# Patient Record
Sex: FEMALE | Race: WHITE | NOT HISPANIC OR LATINO | Employment: OTHER | URBAN - METROPOLITAN AREA
[De-identification: names, ages, dates, MRNs, and addresses within clinical notes are randomized per-mention and may not be internally consistent; named-entity substitution may affect disease eponyms.]

---

## 2018-03-08 ENCOUNTER — OFFICE VISIT (OUTPATIENT)
Dept: NEUROLOGY | Facility: CLINIC | Age: 78
End: 2018-03-08

## 2018-03-08 ENCOUNTER — LAB REQUISITION (OUTPATIENT)
Dept: LAB | Facility: HOSPITAL | Age: 78
End: 2018-03-08

## 2018-03-08 VITALS
HEART RATE: 76 BPM | SYSTOLIC BLOOD PRESSURE: 139 MMHG | BODY MASS INDEX: 32.2 KG/M2 | WEIGHT: 175 LBS | HEIGHT: 62 IN | DIASTOLIC BLOOD PRESSURE: 96 MMHG

## 2018-03-08 DIAGNOSIS — G45.0 VERTEBROBASILAR ARTERY SYNDROME: Primary | ICD-10-CM

## 2018-03-08 DIAGNOSIS — D32.0 CEREBELLAR MENINGIOMA (HCC): ICD-10-CM

## 2018-03-08 DIAGNOSIS — Z00.00 ROUTINE GENERAL MEDICAL EXAMINATION AT A HEALTH CARE FACILITY: ICD-10-CM

## 2018-03-08 PROCEDURE — 99204 OFFICE O/P NEW MOD 45 MIN: CPT | Performed by: PSYCHIATRY & NEUROLOGY

## 2018-03-08 PROCEDURE — 36415 COLL VENOUS BLD VENIPUNCTURE: CPT | Performed by: PSYCHIATRY & NEUROLOGY

## 2018-03-08 RX ORDER — ESCITALOPRAM OXALATE 10 MG/1
10 TABLET ORAL DAILY
Refills: 1 | COMMUNITY
Start: 2017-12-20 | End: 2018-05-02 | Stop reason: DRUGHIGH

## 2018-03-08 RX ORDER — LEVOTHYROXINE SODIUM 88 UG/1
88 TABLET ORAL DAILY
Refills: 1 | COMMUNITY
Start: 2018-02-13 | End: 2020-01-15

## 2018-03-08 NOTE — PROGRESS NOTES
Subjective:     Patient ID: Nisreen Willams is a 77 y.o. female.    CC:   Chief Complaint   Patient presents with   • Headache       HPI:   History of Present Illness  The following portions of the patient's history were reviewed and updated as appropriate: allergies, current medications, past family history, past medical history, past social history, past surgical history and problem list.     76 y/o female was seen at [facility] ED 2 nights ago with sudden onset of intense vertigo, nausea, loss of balance, trouble seeing, symptoms improved in the ED with meclizine which she has continued. CT there suggested a 1/5 cm right CP angle mass probable meningioma. She has not had further symptoms, denies headaches, prior attacks of vertigo.    Past Medical History:   Diagnosis Date   • Arthritis    • Hypertension    • Hypothyroid        Past Surgical History:   Procedure Laterality Date   • CHOLECYSTECTOMY     • HYSTERECTOMY         Social History     Social History   • Marital status:      Spouse name: N/A   • Number of children: N/A   • Years of education: N/A     Occupational History   • Not on file.     Social History Main Topics   • Smoking status: Never Smoker   • Smokeless tobacco: Never Used   • Alcohol use No   • Drug use: No   • Sexual activity: Defer     Other Topics Concern   • Not on file     Social History Narrative       Family History   Problem Relation Age of Onset   • Diabetes Mother    • Hypertension Mother    • Heart disease Father         Review of Systems   Constitutional: Negative for chills, fatigue, fever and unexpected weight change.   HENT: Negative for ear pain, hearing loss, nosebleeds, rhinorrhea and sore throat.    Eyes: Negative for photophobia, pain, discharge, itching and visual disturbance.   Respiratory: Negative for cough, chest tightness, shortness of breath and wheezing.    Cardiovascular: Negative for chest pain, palpitations and leg swelling.   Gastrointestinal: Negative for  abdominal pain, blood in stool, constipation, diarrhea, nausea and vomiting.   Genitourinary: Negative for dysuria, frequency, hematuria and urgency.   Musculoskeletal: Negative for arthralgias, back pain, gait problem, joint swelling, myalgias, neck pain and neck stiffness.   Skin: Negative for rash and wound.   Allergic/Immunologic: Negative for environmental allergies and food allergies.   Neurological: Positive for dizziness and light-headedness. Negative for tremors, seizures, syncope, speech difficulty, weakness, numbness and headaches.   Hematological: Negative for adenopathy. Does not bruise/bleed easily.   Psychiatric/Behavioral: Negative for agitation, confusion, decreased concentration, hallucinations, sleep disturbance and suicidal ideas. The patient is nervous/anxious.         Objective:    Neurologic Exam     Mental Status   Oriented to person, place, and time.     Cranial Nerves     CN III, IV, VI   Pupils are equal, round, and reactive to light.  Extraocular motions are normal.     Motor Exam     Strength   Strength 5/5 throughout.       Physical Exam   Constitutional: She is oriented to person, place, and time. She appears well-developed and well-nourished.   HENT:   Head: Normocephalic and atraumatic.   Eyes: EOM are normal. Pupils are equal, round, and reactive to light.   Neck: Normal range of motion. Neck supple. Carotid bruit is not present.   Cardiovascular: Normal rate, regular rhythm, normal heart sounds and intact distal pulses.    Pulmonary/Chest: Effort normal and breath sounds normal.   Abdominal: Soft. Bowel sounds are normal.   Musculoskeletal: Normal range of motion.   Neurological: She is alert and oriented to person, place, and time. She has normal strength and normal reflexes. No cranial nerve deficit or sensory deficit. She displays a negative Romberg sign. Coordination and gait normal. She displays no Babinski's sign on the right side. She displays no Babinski's sign on the left  side.   Skin: Skin is warm.   Psychiatric: She has a normal mood and affect. Her behavior is normal. Thought content normal. Cognition and memory are normal.       Assessment/Plan:       Nisreen was seen today for headache.    Diagnoses and all orders for this visit:    Vertebrobasilar artery syndrome  -     Creatinine, Serum  -     MRI Brain With & Without Contrast  -     MRI Angiogram Head Without Contrast    Cerebellar meningioma  -     Creatinine, Serum  -     MRI Brain With & Without Contrast  -     MRI Angiogram Head Without Contrast       We discussed that her attack of vertigo is likely of vestibular origin, questionable meningioma may be incidental. MRI/MRA brain is requested for further evaluation. She knows to report new or worsening symptoms immediately.    Donn Saldana MD  3/8/2018

## 2018-03-09 LAB
CREAT SERPL-MCNC: 0.9 MG/DL (ref 0.6–1.3)
GFR SERPLBLD CREATININE-BSD FMLA CKD-EPI: 61 ML/MIN/1.73
GFR SERPLBLD CREATININE-BSD FMLA CKD-EPI: 73 ML/MIN/1.73

## 2018-03-13 ENCOUNTER — HOSPITAL ENCOUNTER (OUTPATIENT)
Dept: MRI IMAGING | Facility: HOSPITAL | Age: 78
Discharge: HOME | End: 2018-03-13
Attending: PSYCHIATRY & NEUROLOGY | Admitting: PSYCHIATRY & NEUROLOGY

## 2018-03-13 ENCOUNTER — HOSPITAL ENCOUNTER (OUTPATIENT)
Dept: MRI IMAGING | Facility: HOSPITAL | Age: 78
Discharge: HOME | End: 2018-03-13
Attending: PSYCHIATRY & NEUROLOGY

## 2018-03-13 DIAGNOSIS — I67.1 CEREBRAL ANEURYSM: Primary | ICD-10-CM

## 2018-03-13 PROCEDURE — 0 GADOBENATE DIMEGLUMINE 529 MG/ML SOLUTION: Performed by: PSYCHIATRY & NEUROLOGY

## 2018-03-13 PROCEDURE — A9577 INJ MULTIHANCE: HCPCS | Performed by: PSYCHIATRY & NEUROLOGY

## 2018-03-13 PROCEDURE — 70544 MR ANGIOGRAPHY HEAD W/O DYE: CPT

## 2018-03-13 PROCEDURE — 70553 MRI BRAIN STEM W/O & W/DYE: CPT

## 2018-03-13 RX ADMIN — GADOBENATE DIMEGLUMINE 15 ML: 529 INJECTION, SOLUTION INTRAVENOUS at 12:50

## 2018-03-15 ENCOUNTER — TELEPHONE (OUTPATIENT)
Dept: NEUROLOGY | Facility: CLINIC | Age: 78
End: 2018-03-15

## 2018-03-15 NOTE — TELEPHONE ENCOUNTER
----- Message from Donn Saldana MD sent at 3/13/2018  2:13 PM EDT -----  Regarding: MRI  Aneurism found, referred to [facility] for ASAP consult, thanks.  ----- Message -----  From: [employer]  Sent: 3/13/2018   1:29 PM  To: Donn Saldana MD

## 2018-03-16 ENCOUNTER — OFFICE VISIT (OUTPATIENT)
Dept: NEUROSURGERY | Facility: CLINIC | Age: 78
End: 2018-03-16

## 2018-03-16 ENCOUNTER — RESULTS ENCOUNTER (OUTPATIENT)
Dept: NEUROSURGERY | Facility: CLINIC | Age: 78
End: 2018-03-16

## 2018-03-16 VITALS
TEMPERATURE: 98 F | HEIGHT: 62 IN | WEIGHT: 173 LBS | DIASTOLIC BLOOD PRESSURE: 80 MMHG | BODY MASS INDEX: 31.83 KG/M2 | SYSTOLIC BLOOD PRESSURE: 122 MMHG

## 2018-03-16 DIAGNOSIS — I67.1 INTRACRANIAL ANEURYSM: Primary | ICD-10-CM

## 2018-03-16 DIAGNOSIS — I67.1 INTRACRANIAL ANEURYSM: ICD-10-CM

## 2018-03-16 PROCEDURE — 99203 OFFICE O/P NEW LOW 30 MIN: CPT | Performed by: NEUROLOGICAL SURGERY

## 2018-03-16 RX ORDER — SODIUM CHLORIDE 9 MG/ML
50 INJECTION, SOLUTION INTRAVENOUS CONTINUOUS
Status: CANCELLED | OUTPATIENT
Start: 2018-03-16

## 2018-03-16 RX ORDER — SODIUM CHLORIDE 0.9 % (FLUSH) 0.9 %
1-10 SYRINGE (ML) INJECTION AS NEEDED
Status: CANCELLED | OUTPATIENT
Start: 2018-03-16

## 2018-03-16 NOTE — H&P
Subjective     Chief Complaint: Intracranial aneurysm    Patient ID: Gloria Willams is a 77 y.o. 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  She denies any headaches, nausea, vomiting, or strokelike symptoms.  She has no family history of intracranial aneurysms.  Her risk factors for aneurysm include hypertension.  She has never smoked cigarettes.            The following portions of the patient's history were reviewed and updated as appropriate: allergies, current medications, past family history, past medical history, past social history, past surgical history and problem list.    Family history:   Family History   Problem Relation Age of Onset   • Diabetes Mother    • Hypertension Mother    • Heart disease Father        Social history:   Social History     Social History   • Marital status:      Spouse name: N/A   • Number of children: N/A   • Years of education: N/A     Occupational History   • Not on file.     Social History Main Topics   • Smoking status: Never Smoker   • Smokeless tobacco: Never Used   • Alcohol use No   • Drug use: No   • Sexual activity: Defer     Other Topics Concern   • Not on file     Social History Narrative   • No narrative on file       Review of Systems   Constitutional: Negative for activity change, appetite change, chills, diaphoresis, fatigue, fever and unexpected weight change.   HENT: Negative for congestion, dental problem, drooling, ear discharge, ear pain, facial swelling, hearing loss, mouth sores, nosebleeds, postnasal drip, rhinorrhea, sinus pressure, sneezing, sore throat, tinnitus, trouble swallowing and voice change.    Eyes: Negative for photophobia, pain, discharge, redness, itching and visual disturbance.   Respiratory: Negative for apnea, cough, choking, chest tightness, shortness of breath,  "wheezing and stridor.    Cardiovascular: Negative for chest pain, palpitations and leg swelling.   Gastrointestinal: Negative for abdominal distention, abdominal pain, anal bleeding, blood in stool, constipation, diarrhea, nausea, rectal pain and vomiting.   Endocrine: Negative for cold intolerance, heat intolerance, polydipsia, polyphagia and polyuria.   Genitourinary: Negative for decreased urine volume, difficulty urinating, dysuria, enuresis, flank pain, frequency, genital sores, hematuria and urgency.   Musculoskeletal: Negative for arthralgias, back pain, gait problem, joint swelling, myalgias, neck pain and neck stiffness.   Skin: Negative for color change, pallor, rash and wound.   Allergic/Immunologic: Negative for environmental allergies, food allergies and immunocompromised state.   Neurological: Positive for dizziness and light-headedness. Negative for tremors, seizures, syncope, facial asymmetry, speech difficulty, weakness, numbness and headaches.   Hematological: Negative for adenopathy. Does not bruise/bleed easily.   Psychiatric/Behavioral: Negative for agitation, behavioral problems, confusion, decreased concentration, dysphoric mood, hallucinations, self-injury, sleep disturbance and suicidal ideas. The patient is not nervous/anxious and is not hyperactive.        Objective   Blood pressure 122/80, temperature 98 °F (36.7 °C), height 157.5 cm (62.01\"), weight 78.5 kg (173 lb).  Body mass index is 31.63 kg/m².    Physical Exam   Constitutional: She is oriented to person, place, and time. She appears well-developed and well-nourished.  Non-toxic appearance. No distress.   HENT:   Head: Normocephalic and atraumatic.   Mouth/Throat: Oropharynx is clear and moist.   Eyes: EOM are normal. Pupils are equal, round, and reactive to light. Right eye exhibits no discharge. Left eye exhibits no discharge.   Neck: Phonation normal. No JVD present. No tracheal deviation present. No thyromegaly present. "   Cardiovascular: Normal rate and regular rhythm.    Pulmonary/Chest: Effort normal. No stridor. No respiratory distress. She has no wheezes.   Abdominal: Soft. Normal appearance. She exhibits no distension. There is no tenderness.   Musculoskeletal: She exhibits no edema.   Muscle Group     L          R  Deltoid                5          5  Bicep                  5          5  Tricep                 5          5                       5          5  Hand IO              5          5  Hip Flexor           5          5  Knee Extensor   5          5     ADF                    5          5  APF                    5          5      Neurological: She is alert and oriented to person, place, and time. She has normal reflexes. She displays normal reflexes. No cranial nerve deficit or sensory deficit. She exhibits normal muscle tone. She displays a negative Romberg sign. Coordination and gait normal. GCS eye subscore is 4. GCS verbal subscore is 5. GCS motor subscore is 6.   Reflex Scores:       Tricep reflexes are 2+ on the right side and 2+ on the left side.       Bicep reflexes are 2+ on the right side and 2+ on the left side.       Brachioradialis reflexes are 2+ on the right side and 2+ on the left side.       Patellar reflexes are 2+ on the right side and 2+ on the left side.       Achilles reflexes are 2+ on the right side and 2+ on the left side.  CN II-XII grossly intact.    No pronator drift. FTN testing performed without dysmetria or bradykinesia.   Skin: Skin is warm and dry. She is not diaphoretic. No erythema.   Psychiatric: She has a normal mood and affect. Her speech is normal and behavior is normal. Judgment and thought content normal.   Nursing note and vitals reviewed.        Assessment/Plan     Independent Review of Radiographic Studies:      Available for my review is a MRA of the brain that was performed on 3/13/18.  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    Medical Decision Making:      This is a 77-year-old woman with an incidentally discovered right PICA aneurysm following workup for vertigo.  She needs a diagnostic cerebral angiogram, and she will probably ultimately need some form of treatment.  A discussion of clipping versus coiling will be undertaken after her angiogram has been performed.    Informed consent was obtained for a cerebral angiogram and possible intervention. She acknowledges a risk of stroke, coma, death, pain, paralysis, blindness, permanent neurological disability, access complications, hematoma, limb ischemia, renal failure, allergic reaction, radiation exposure, hair loss, tumor growth, failure of benefit of the procedure, or need for additional procedures. All questions were answered. No guarantees were given or implied. She elects to proceed.    We will schedule her for an elective angiogram in the near future.  I did review the signs and symptoms of subarachnoid hemorrhage with her, and I directed her to contact my office with new, or worsening symptoms.  I told her to call 911 if she thinks she is having a stroke.    Gloria was seen today for cerebral aneurysm.    Diagnoses and all orders for this visit:    Intracranial aneurysm        No Follow-up on file.           This document signed by JUSTIN Cary MD March 16, 2018 10:46 AM

## 2018-03-27 ENCOUNTER — HOSPITAL ENCOUNTER (OUTPATIENT)
Facility: HOSPITAL | Age: 78
Discharge: HOME | End: 2018-03-27
Attending: NEUROLOGICAL SURGERY | Admitting: NEUROLOGICAL SURGERY

## 2018-03-27 VITALS
HEIGHT: 62 IN | TEMPERATURE: 97.3 F | OXYGEN SATURATION: 95 % | WEIGHT: 172.84 LBS | DIASTOLIC BLOOD PRESSURE: 68 MMHG | RESPIRATION RATE: 16 BRPM | BODY MASS INDEX: 31.81 KG/M2 | HEART RATE: 76 BPM | SYSTOLIC BLOOD PRESSURE: 123 MMHG

## 2018-03-27 DIAGNOSIS — I67.1 INTRACRANIAL ANEURYSM: ICD-10-CM

## 2018-03-27 LAB
ANION GAP SERPL CALCULATED.3IONS-SCNC: 5 MMOL/L (ref 3–11)
BUN BLD-MCNC: 20 MG/DL (ref 9–23)
BUN/CREAT SERPL: 20 (ref 7–25)
CALCIUM SPEC-SCNC: 9.4 MG/DL (ref 8.7–10.4)
CHLORIDE SERPL-SCNC: 103 MMOL/L (ref 99–109)
CO2 SERPL-SCNC: 27 MMOL/L (ref 20–31)
CREAT BLD-MCNC: 1 MG/DL (ref 0.6–1.3)
DEPRECATED RDW RBC AUTO: 46.9 FL (ref 37–54)
ERYTHROCYTE [DISTWIDTH] IN BLOOD BY AUTOMATED COUNT: 13.1 % (ref 11.3–14.5)
GFR SERPL CREATININE-BSD FRML MDRD: 54 ML/MIN/1.73
GLUCOSE BLD-MCNC: 95 MG/DL (ref 70–100)
HBA1C MFR BLD: 5.9 % (ref 4.8–5.6)
HCT VFR BLD AUTO: 43.4 % (ref 34.5–44)
HGB BLD-MCNC: 14.4 G/DL (ref 11.5–15.5)
MCH RBC QN AUTO: 32.2 PG (ref 27–31)
MCHC RBC AUTO-ENTMCNC: 33.2 G/DL (ref 32–36)
MCV RBC AUTO: 97.1 FL (ref 80–99)
PLATELET # BLD AUTO: 277 10*3/MM3 (ref 150–450)
PMV BLD AUTO: 11.6 FL (ref 6–12)
POTASSIUM BLD-SCNC: 4.5 MMOL/L (ref 3.5–5.5)
RBC # BLD AUTO: 4.47 10*6/MM3 (ref 3.89–5.14)
SODIUM BLD-SCNC: 135 MMOL/L (ref 132–146)
WBC NRBC COR # BLD: 3.93 10*3/MM3 (ref 3.5–10.8)

## 2018-03-27 PROCEDURE — 36227 PLACE CATH XTRNL CAROTID: CPT | Performed by: NEUROLOGICAL SURGERY

## 2018-03-27 PROCEDURE — 0 IODIXANOL PER 1 ML: Performed by: NEUROLOGICAL SURGERY

## 2018-03-27 PROCEDURE — 80048 BASIC METABOLIC PNL TOTAL CA: CPT | Performed by: NEUROLOGICAL SURGERY

## 2018-03-27 PROCEDURE — C1894 INTRO/SHEATH, NON-LASER: HCPCS | Performed by: NEUROLOGICAL SURGERY

## 2018-03-27 PROCEDURE — 85027 COMPLETE CBC AUTOMATED: CPT | Performed by: NEUROLOGICAL SURGERY

## 2018-03-27 PROCEDURE — 83036 HEMOGLOBIN GLYCOSYLATED A1C: CPT | Performed by: NEUROLOGICAL SURGERY

## 2018-03-27 PROCEDURE — 25010000002 MIDAZOLAM PER 1 MG: Performed by: NEUROLOGICAL SURGERY

## 2018-03-27 PROCEDURE — 36415 COLL VENOUS BLD VENIPUNCTURE: CPT

## 2018-03-27 PROCEDURE — 25010000002 FENTANYL CITRATE (PF) 100 MCG/2ML SOLUTION: Performed by: NEUROLOGICAL SURGERY

## 2018-03-27 PROCEDURE — C1760 CLOSURE DEV, VASC: HCPCS | Performed by: NEUROLOGICAL SURGERY

## 2018-03-27 PROCEDURE — C1769 GUIDE WIRE: HCPCS | Performed by: NEUROLOGICAL SURGERY

## 2018-03-27 PROCEDURE — 36226 PLACE CATH VERTEBRAL ART: CPT | Performed by: NEUROLOGICAL SURGERY

## 2018-03-27 PROCEDURE — 36224 PLACE CATH CAROTD ART: CPT | Performed by: NEUROLOGICAL SURGERY

## 2018-03-27 RX ORDER — MIDAZOLAM HYDROCHLORIDE 1 MG/ML
INJECTION INTRAMUSCULAR; INTRAVENOUS AS NEEDED
Status: DISCONTINUED | OUTPATIENT
Start: 2018-03-27 | End: 2018-03-27 | Stop reason: HOSPADM

## 2018-03-27 RX ORDER — ACETAMINOPHEN 325 MG/1
650 TABLET ORAL EVERY 4 HOURS PRN
Status: DISCONTINUED | OUTPATIENT
Start: 2018-03-27 | End: 2018-03-27 | Stop reason: HOSPADM

## 2018-03-27 RX ORDER — SODIUM CHLORIDE 0.9 % (FLUSH) 0.9 %
1-10 SYRINGE (ML) INJECTION AS NEEDED
Status: DISCONTINUED | OUTPATIENT
Start: 2018-03-27 | End: 2018-03-27 | Stop reason: HOSPADM

## 2018-03-27 RX ORDER — SODIUM CHLORIDE 9 MG/ML
50 INJECTION, SOLUTION INTRAVENOUS CONTINUOUS
Status: DISCONTINUED | OUTPATIENT
Start: 2018-03-27 | End: 2018-03-27 | Stop reason: HOSPADM

## 2018-03-27 RX ORDER — SODIUM CHLORIDE 9 MG/ML
75 INJECTION, SOLUTION INTRAVENOUS CONTINUOUS
Status: ACTIVE | OUTPATIENT
Start: 2018-03-27 | End: 2018-03-27

## 2018-03-27 RX ORDER — IODIXANOL 320 MG/ML
INJECTION, SOLUTION INTRAVASCULAR AS NEEDED
Status: DISCONTINUED | OUTPATIENT
Start: 2018-03-27 | End: 2018-03-27 | Stop reason: HOSPADM

## 2018-03-27 RX ORDER — LIDOCAINE HYDROCHLORIDE 10 MG/ML
INJECTION, SOLUTION INFILTRATION; PERINEURAL AS NEEDED
Status: DISCONTINUED | OUTPATIENT
Start: 2018-03-27 | End: 2018-03-27 | Stop reason: HOSPADM

## 2018-03-27 RX ORDER — FENTANYL CITRATE 50 UG/ML
INJECTION, SOLUTION INTRAMUSCULAR; INTRAVENOUS AS NEEDED
Status: DISCONTINUED | OUTPATIENT
Start: 2018-03-27 | End: 2018-03-27 | Stop reason: HOSPADM

## 2018-03-27 RX ADMIN — SODIUM CHLORIDE 50 ML/HR: 9 INJECTION, SOLUTION INTRAVENOUS at 12:28

## 2018-03-28 RX ORDER — SODIUM CHLORIDE 0.9 % (FLUSH) 0.9 %
1-10 SYRINGE (ML) INJECTION AS NEEDED
Status: CANCELLED | OUTPATIENT
Start: 2018-03-28

## 2018-03-28 RX ORDER — SODIUM CHLORIDE 9 MG/ML
50 INJECTION, SOLUTION INTRAVENOUS CONTINUOUS
Status: CANCELLED | OUTPATIENT
Start: 2018-03-28

## 2018-03-28 NOTE — H&P
Subjective     Chief Complaint: Intracranial aneurysm    Patient ID: Gloria Willams is a 77 y.o. 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  She denies any headaches, nausea, vomiting, or strokelike symptoms.  She has no family history of intracranial aneurysms.  Her risk factors for aneurysm include hypertension.  She has never smoked cigarettes.            The following portions of the patient's history were reviewed and updated as appropriate: allergies, current medications, past family history, past medical history, past social history, past surgical history and problem list.    Family history:   Family History   Problem Relation Age of Onset   • Diabetes Mother    • Hypertension Mother    • Heart disease Father        Social history:   Social History     Social History   • Marital status:      Spouse name: N/A   • Number of children: N/A   • Years of education: N/A     Occupational History   • Not on file.     Social History Main Topics   • Smoking status: Never Smoker   • Smokeless tobacco: Never Used   • Alcohol use No   • Drug use: No   • Sexual activity: Defer     Other Topics Concern   • Not on file     Social History Narrative   • No narrative on file       Review of Systems   Constitutional: Negative for activity change, appetite change, chills, diaphoresis, fatigue, fever and unexpected weight change.   HENT: Negative for congestion, dental problem, drooling, ear discharge, ear pain, facial swelling, hearing loss, mouth sores, nosebleeds, postnasal drip, rhinorrhea, sinus pressure, sneezing, sore throat, tinnitus, trouble swallowing and voice change.    Eyes: Negative for photophobia, pain, discharge, redness, itching and visual disturbance.   Respiratory: Negative for apnea, cough, choking, chest tightness, shortness of breath,  "wheezing and stridor.    Cardiovascular: Negative for chest pain, palpitations and leg swelling.   Gastrointestinal: Negative for abdominal distention, abdominal pain, anal bleeding, blood in stool, constipation, diarrhea, nausea, rectal pain and vomiting.   Endocrine: Negative for cold intolerance, heat intolerance, polydipsia, polyphagia and polyuria.   Genitourinary: Negative for decreased urine volume, difficulty urinating, dysuria, enuresis, flank pain, frequency, genital sores, hematuria and urgency.   Musculoskeletal: Negative for arthralgias, back pain, gait problem, joint swelling, myalgias, neck pain and neck stiffness.   Skin: Negative for color change, pallor, rash and wound.   Allergic/Immunologic: Negative for environmental allergies, food allergies and immunocompromised state.   Neurological: Positive for dizziness and light-headedness. Negative for tremors, seizures, syncope, facial asymmetry, speech difficulty, weakness, numbness and headaches.   Hematological: Negative for adenopathy. Does not bruise/bleed easily.   Psychiatric/Behavioral: Negative for agitation, behavioral problems, confusion, decreased concentration, dysphoric mood, hallucinations, self-injury, sleep disturbance and suicidal ideas. The patient is not nervous/anxious and is not hyperactive.        Objective   Blood pressure 122/80, temperature 98 °F (36.7 °C), height 157.5 cm (62.01\"), weight 78.5 kg (173 lb).  Body mass index is 31.63 kg/m².    Physical Exam   Constitutional: She is oriented to person, place, and time. She appears well-developed and well-nourished.  Non-toxic appearance. No distress.   HENT:   Head: Normocephalic and atraumatic.   Mouth/Throat: Oropharynx is clear and moist.   Eyes: EOM are normal. Pupils are equal, round, and reactive to light. Right eye exhibits no discharge. Left eye exhibits no discharge.   Neck: Phonation normal. No JVD present. No tracheal deviation present. No thyromegaly present. "   Cardiovascular: Normal rate and regular rhythm.    Pulmonary/Chest: Effort normal. No stridor. No respiratory distress. She has no wheezes.   Abdominal: Soft. Normal appearance. She exhibits no distension. There is no tenderness.   Musculoskeletal: She exhibits no edema.   Muscle Group     L          R  Deltoid                5          5  Bicep                  5          5  Tricep                 5          5                       5          5  Hand IO              5          5  Hip Flexor           5          5  Knee Extensor   5          5     ADF                    5          5  APF                    5          5      Neurological: She is alert and oriented to person, place, and time. She has normal reflexes. She displays normal reflexes. No cranial nerve deficit or sensory deficit. She exhibits normal muscle tone. She displays a negative Romberg sign. Coordination and gait normal. GCS eye subscore is 4. GCS verbal subscore is 5. GCS motor subscore is 6.   Reflex Scores:       Tricep reflexes are 2+ on the right side and 2+ on the left side.       Bicep reflexes are 2+ on the right side and 2+ on the left side.       Brachioradialis reflexes are 2+ on the right side and 2+ on the left side.       Patellar reflexes are 2+ on the right side and 2+ on the left side.       Achilles reflexes are 2+ on the right side and 2+ on the left side.  CN II-XII grossly intact.    No pronator drift. FTN testing performed without dysmetria or bradykinesia.   Skin: Skin is warm and dry. She is not diaphoretic. No erythema.   Psychiatric: She has a normal mood and affect. Her speech is normal and behavior is normal. Judgment and thought content normal.   Nursing note and vitals reviewed.        Assessment/Plan     Independent Review of Radiographic Studies:      Available for my review is a MRA of the brain that was performed on 3/13/18.  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    Medical Decision Making:      This is a 77-year-old woman with an incidentally discovered right PICA aneurysm following workup for vertigo.  She needs a diagnostic cerebral angiogram, and she will probably ultimately need some form of treatment.  A discussion of clipping versus coiling will be undertaken after her angiogram has been performed.    Informed consent was obtained for a cerebral angiogram and possible intervention. She acknowledges a risk of stroke, coma, death, pain, paralysis, blindness, permanent neurological disability, access complications, hematoma, limb ischemia, renal failure, allergic reaction, radiation exposure, hair loss, tumor growth, failure of benefit of the procedure, or need for additional procedures. All questions were answered. No guarantees were given or implied. She elects to proceed.    We will schedule her for an elective angiogram in the near future.  I did review the signs and symptoms of subarachnoid hemorrhage with her, and I directed her to contact my office with new, or worsening symptoms.  I told her to call 911 if she thinks she is having a stroke.    Gloria was seen today for cerebral aneurysm.    Diagnoses and all orders for this visit:    Intracranial aneurysm        No Follow-up on file.

## 2018-04-17 ENCOUNTER — ANESTHESIA (OUTPATIENT)
Dept: CARDIOLOGY | Facility: HOSPITAL | Age: 78
End: 2018-04-17

## 2018-04-17 ENCOUNTER — HOSPITAL ENCOUNTER (INPATIENT)
Facility: HOSPITAL | Age: 78
LOS: 1 days | Discharge: HOME | End: 2018-04-18
Attending: NEUROLOGICAL SURGERY | Admitting: NEUROLOGICAL SURGERY

## 2018-04-17 ENCOUNTER — ANESTHESIA EVENT (OUTPATIENT)
Dept: CARDIOLOGY | Facility: HOSPITAL | Age: 78
End: 2018-04-17

## 2018-04-17 DIAGNOSIS — I67.1 INTRACRANIAL ANEURYSM: ICD-10-CM

## 2018-04-17 LAB
ANION GAP SERPL CALCULATED.3IONS-SCNC: 4 MMOL/L (ref 3–11)
BUN BLD-MCNC: 19 MG/DL (ref 9–23)
BUN/CREAT SERPL: 21.1 (ref 7–25)
CALCIUM SPEC-SCNC: 9.4 MG/DL (ref 8.7–10.4)
CHLORIDE SERPL-SCNC: 106 MMOL/L (ref 99–109)
CO2 SERPL-SCNC: 27 MMOL/L (ref 20–31)
CREAT BLD-MCNC: 0.9 MG/DL (ref 0.6–1.3)
DEPRECATED RDW RBC AUTO: 45.5 FL (ref 37–54)
ERYTHROCYTE [DISTWIDTH] IN BLOOD BY AUTOMATED COUNT: 13.2 % (ref 11.3–14.5)
GFR SERPL CREATININE-BSD FRML MDRD: 61 ML/MIN/1.73
GLUCOSE BLD-MCNC: 99 MG/DL (ref 70–100)
HCT VFR BLD AUTO: 41.1 % (ref 34.5–44)
HGB BLD-MCNC: 13.4 G/DL (ref 11.5–15.5)
MCH RBC QN AUTO: 31.5 PG (ref 27–31)
MCHC RBC AUTO-ENTMCNC: 32.6 G/DL (ref 32–36)
MCV RBC AUTO: 96.7 FL (ref 80–99)
PLATELET # BLD AUTO: 257 10*3/MM3 (ref 150–450)
PMV BLD AUTO: 10.7 FL (ref 6–12)
POTASSIUM BLD-SCNC: 4.3 MMOL/L (ref 3.5–5.5)
RBC # BLD AUTO: 4.25 10*6/MM3 (ref 3.89–5.14)
SODIUM BLD-SCNC: 137 MMOL/L (ref 132–146)
WBC NRBC COR # BLD: 4.42 10*3/MM3 (ref 3.5–10.8)

## 2018-04-17 PROCEDURE — 80048 BASIC METABOLIC PNL TOTAL CA: CPT | Performed by: NEUROLOGICAL SURGERY

## 2018-04-17 PROCEDURE — 25010000002 ONDANSETRON PER 1 MG: Performed by: NURSE ANESTHETIST, CERTIFIED REGISTERED

## 2018-04-17 PROCEDURE — 25010000003 CEFAZOLIN IN DEXTROSE 2-4 GM/100ML-% SOLUTION: Performed by: NEUROLOGICAL SURGERY

## 2018-04-17 PROCEDURE — C1887 CATHETER, GUIDING: HCPCS | Performed by: NEUROLOGICAL SURGERY

## 2018-04-17 PROCEDURE — 25010000002 NEOSTIGMINE PER 0.5 MG: Performed by: NURSE ANESTHETIST, CERTIFIED REGISTERED

## 2018-04-17 PROCEDURE — 93010 ELECTROCARDIOGRAM REPORT: CPT | Performed by: INTERNAL MEDICINE

## 2018-04-17 PROCEDURE — 99024 POSTOP FOLLOW-UP VISIT: CPT | Performed by: NEUROLOGICAL SURGERY

## 2018-04-17 PROCEDURE — 99232 SBSQ HOSP IP/OBS MODERATE 35: CPT | Performed by: INTERNAL MEDICINE

## 2018-04-17 PROCEDURE — C1769 GUIDE WIRE: HCPCS | Performed by: NEUROLOGICAL SURGERY

## 2018-04-17 PROCEDURE — 36415 COLL VENOUS BLD VENIPUNCTURE: CPT

## 2018-04-17 PROCEDURE — 25010000002 PROPOFOL 10 MG/ML EMULSION: Performed by: NURSE ANESTHETIST, CERTIFIED REGISTERED

## 2018-04-17 PROCEDURE — 85027 COMPLETE CBC AUTOMATED: CPT | Performed by: NEUROLOGICAL SURGERY

## 2018-04-17 PROCEDURE — C1894 INTRO/SHEATH, NON-LASER: HCPCS | Performed by: NEUROLOGICAL SURGERY

## 2018-04-17 PROCEDURE — 36226 PLACE CATH VERTEBRAL ART: CPT | Performed by: NEUROLOGICAL SURGERY

## 2018-04-17 PROCEDURE — C1760 CLOSURE DEV, VASC: HCPCS | Performed by: NEUROLOGICAL SURGERY

## 2018-04-17 PROCEDURE — 75898 FOLLOW-UP ANGIOGRAPHY: CPT | Performed by: NEUROLOGICAL SURGERY

## 2018-04-17 PROCEDURE — 61624 TCAT PERM OCCLS/EMBOLJ CNS: CPT | Performed by: NEUROLOGICAL SURGERY

## 2018-04-17 PROCEDURE — 75894 X-RAYS TRANSCATH THERAPY: CPT | Performed by: NEUROLOGICAL SURGERY

## 2018-04-17 PROCEDURE — 93005 ELECTROCARDIOGRAM TRACING: CPT | Performed by: ANESTHESIOLOGY

## 2018-04-17 PROCEDURE — 0 IODIXANOL PER 1 ML: Performed by: NEUROLOGICAL SURGERY

## 2018-04-17 PROCEDURE — C1766 INTRO/SHEATH,STRBLE,NON-PEEL: HCPCS | Performed by: NEUROLOGICAL SURGERY

## 2018-04-17 PROCEDURE — 03LG3DZ OCCLUSION OF INTRACRANIAL ARTERY WITH INTRALUMINAL DEVICE, PERCUTANEOUS APPROACH: ICD-10-PCS | Performed by: NEUROLOGICAL SURGERY

## 2018-04-17 PROCEDURE — 80047 BASIC METABLC PNL IONIZED CA: CPT

## 2018-04-17 PROCEDURE — 36217 PLACE CATHETER IN ARTERY: CPT | Performed by: NEUROLOGICAL SURGERY

## 2018-04-17 PROCEDURE — B31DYZZ FLUOROSCOPY OF RIGHT VERTEBRAL ARTERY USING OTHER CONTRAST: ICD-10-PCS | Performed by: NEUROLOGICAL SURGERY

## 2018-04-17 PROCEDURE — 85014 HEMATOCRIT: CPT

## 2018-04-17 PROCEDURE — 25010000002 HEPARIN (PORCINE) PER 1000 UNITS: Performed by: NURSE ANESTHETIST, CERTIFIED REGISTERED

## 2018-04-17 PROCEDURE — 25010000002 FENTANYL CITRATE (PF) 100 MCG/2ML SOLUTION: Performed by: NURSE ANESTHETIST, CERTIFIED REGISTERED

## 2018-04-17 PROCEDURE — 25010000002 DEXAMETHASONE PER 1 MG: Performed by: NURSE ANESTHETIST, CERTIFIED REGISTERED

## 2018-04-17 DEVICE — 360 ULTRA DETACHABLE COIL
Type: IMPLANTABLE DEVICE | Status: FUNCTIONAL
Brand: TARGET

## 2018-04-17 DEVICE — 3D DETACHABLE COIL
Type: IMPLANTABLE DEVICE | Status: FUNCTIONAL
Brand: TARGET

## 2018-04-17 RX ORDER — LIDOCAINE HYDROCHLORIDE 10 MG/ML
0.5 INJECTION, SOLUTION EPIDURAL; INFILTRATION; INTRACAUDAL; PERINEURAL ONCE AS NEEDED
Status: DISCONTINUED | OUTPATIENT
Start: 2018-04-17 | End: 2018-04-17 | Stop reason: HOSPADM

## 2018-04-17 RX ORDER — PROMETHAZINE HYDROCHLORIDE 25 MG/1
25 SUPPOSITORY RECTAL ONCE AS NEEDED
Status: DISCONTINUED | OUTPATIENT
Start: 2018-04-17 | End: 2018-04-17 | Stop reason: HOSPADM

## 2018-04-17 RX ORDER — ONDANSETRON 2 MG/ML
4 INJECTION INTRAMUSCULAR; INTRAVENOUS EVERY 6 HOURS PRN
Status: DISCONTINUED | OUTPATIENT
Start: 2018-04-17 | End: 2018-04-18 | Stop reason: HOSPADM

## 2018-04-17 RX ORDER — PROMETHAZINE HYDROCHLORIDE 25 MG/ML
6.25 INJECTION, SOLUTION INTRAMUSCULAR; INTRAVENOUS ONCE AS NEEDED
Status: DISCONTINUED | OUTPATIENT
Start: 2018-04-17 | End: 2018-04-17 | Stop reason: HOSPADM

## 2018-04-17 RX ORDER — SODIUM CHLORIDE 0.9 % (FLUSH) 0.9 %
1-10 SYRINGE (ML) INJECTION AS NEEDED
Status: DISCONTINUED | OUTPATIENT
Start: 2018-04-17 | End: 2018-04-17 | Stop reason: HOSPADM

## 2018-04-17 RX ORDER — SODIUM CHLORIDE, SODIUM LACTATE, POTASSIUM CHLORIDE, CALCIUM CHLORIDE 600; 310; 30; 20 MG/100ML; MG/100ML; MG/100ML; MG/100ML
9 INJECTION, SOLUTION INTRAVENOUS CONTINUOUS
Status: DISCONTINUED | OUTPATIENT
Start: 2018-04-17 | End: 2018-04-18

## 2018-04-17 RX ORDER — SODIUM CHLORIDE 0.9 % (FLUSH) 0.9 %
1-10 SYRINGE (ML) INJECTION AS NEEDED
Status: DISCONTINUED | OUTPATIENT
Start: 2018-04-17 | End: 2018-04-18 | Stop reason: HOSPADM

## 2018-04-17 RX ORDER — HYDROMORPHONE HYDROCHLORIDE 1 MG/ML
0.5 INJECTION, SOLUTION INTRAMUSCULAR; INTRAVENOUS; SUBCUTANEOUS
Status: DISCONTINUED | OUTPATIENT
Start: 2018-04-17 | End: 2018-04-17 | Stop reason: HOSPADM

## 2018-04-17 RX ORDER — IODIXANOL 320 MG/ML
INJECTION, SOLUTION INTRAVASCULAR AS NEEDED
Status: DISCONTINUED | OUTPATIENT
Start: 2018-04-17 | End: 2018-04-17 | Stop reason: HOSPADM

## 2018-04-17 RX ORDER — GLYCOPYRROLATE 0.2 MG/ML
INJECTION INTRAMUSCULAR; INTRAVENOUS AS NEEDED
Status: DISCONTINUED | OUTPATIENT
Start: 2018-04-17 | End: 2018-04-17 | Stop reason: SURG

## 2018-04-17 RX ORDER — SODIUM CHLORIDE 9 MG/ML
50 INJECTION, SOLUTION INTRAVENOUS CONTINUOUS
Status: DISCONTINUED | OUTPATIENT
Start: 2018-04-17 | End: 2018-04-18

## 2018-04-17 RX ORDER — PROPOFOL 10 MG/ML
VIAL (ML) INTRAVENOUS AS NEEDED
Status: DISCONTINUED | OUTPATIENT
Start: 2018-04-17 | End: 2018-04-17 | Stop reason: SURG

## 2018-04-17 RX ORDER — SODIUM CHLORIDE 9 MG/ML
75 INJECTION, SOLUTION INTRAVENOUS CONTINUOUS
Status: DISCONTINUED | OUTPATIENT
Start: 2018-04-17 | End: 2018-04-18 | Stop reason: HOSPADM

## 2018-04-17 RX ORDER — FENTANYL CITRATE 50 UG/ML
INJECTION, SOLUTION INTRAMUSCULAR; INTRAVENOUS AS NEEDED
Status: DISCONTINUED | OUTPATIENT
Start: 2018-04-17 | End: 2018-04-17 | Stop reason: SURG

## 2018-04-17 RX ORDER — LIDOCAINE HYDROCHLORIDE 10 MG/ML
INJECTION, SOLUTION EPIDURAL; INFILTRATION; INTRACAUDAL; PERINEURAL AS NEEDED
Status: DISCONTINUED | OUTPATIENT
Start: 2018-04-17 | End: 2018-04-17 | Stop reason: HOSPADM

## 2018-04-17 RX ORDER — ACETAMINOPHEN 325 MG/1
650 TABLET ORAL EVERY 4 HOURS PRN
Status: DISCONTINUED | OUTPATIENT
Start: 2018-04-17 | End: 2018-04-18 | Stop reason: HOSPADM

## 2018-04-17 RX ORDER — PROMETHAZINE HYDROCHLORIDE 25 MG/1
25 TABLET ORAL ONCE AS NEEDED
Status: DISCONTINUED | OUTPATIENT
Start: 2018-04-17 | End: 2018-04-17 | Stop reason: HOSPADM

## 2018-04-17 RX ORDER — ONDANSETRON 2 MG/ML
INJECTION INTRAMUSCULAR; INTRAVENOUS AS NEEDED
Status: DISCONTINUED | OUTPATIENT
Start: 2018-04-17 | End: 2018-04-17 | Stop reason: SURG

## 2018-04-17 RX ORDER — ROCURONIUM BROMIDE 10 MG/ML
INJECTION, SOLUTION INTRAVENOUS AS NEEDED
Status: DISCONTINUED | OUTPATIENT
Start: 2018-04-17 | End: 2018-04-17 | Stop reason: SURG

## 2018-04-17 RX ORDER — FAMOTIDINE 20 MG/1
20 TABLET, FILM COATED ORAL ONCE
Status: DISCONTINUED | OUTPATIENT
Start: 2018-04-17 | End: 2018-04-17 | Stop reason: HOSPADM

## 2018-04-17 RX ORDER — FENTANYL CITRATE 50 UG/ML
50 INJECTION, SOLUTION INTRAMUSCULAR; INTRAVENOUS
Status: DISCONTINUED | OUTPATIENT
Start: 2018-04-17 | End: 2018-04-17 | Stop reason: HOSPADM

## 2018-04-17 RX ORDER — HYDROCODONE BITARTRATE AND ACETAMINOPHEN 5; 325 MG/1; MG/1
1 TABLET ORAL EVERY 4 HOURS PRN
Status: DISCONTINUED | OUTPATIENT
Start: 2018-04-17 | End: 2018-04-18 | Stop reason: HOSPADM

## 2018-04-17 RX ORDER — LIDOCAINE HYDROCHLORIDE 10 MG/ML
INJECTION, SOLUTION INFILTRATION; PERINEURAL AS NEEDED
Status: DISCONTINUED | OUTPATIENT
Start: 2018-04-17 | End: 2018-04-17 | Stop reason: SURG

## 2018-04-17 RX ORDER — CEFAZOLIN SODIUM 2 G/100ML
2 INJECTION, SOLUTION INTRAVENOUS ONCE
Status: COMPLETED | OUTPATIENT
Start: 2018-04-17 | End: 2018-04-17

## 2018-04-17 RX ORDER — ASPIRIN 325 MG
325 TABLET, DELAYED RELEASE (ENTERIC COATED) ORAL DAILY
Status: DISCONTINUED | OUTPATIENT
Start: 2018-04-17 | End: 2018-04-18 | Stop reason: HOSPADM

## 2018-04-17 RX ORDER — DEXAMETHASONE SODIUM PHOSPHATE 4 MG/ML
INJECTION, SOLUTION INTRA-ARTICULAR; INTRALESIONAL; INTRAMUSCULAR; INTRAVENOUS; SOFT TISSUE AS NEEDED
Status: DISCONTINUED | OUTPATIENT
Start: 2018-04-17 | End: 2018-04-17 | Stop reason: SURG

## 2018-04-17 RX ORDER — HEPARIN SODIUM 1000 [USP'U]/ML
INJECTION, SOLUTION INTRAVENOUS; SUBCUTANEOUS AS NEEDED
Status: DISCONTINUED | OUTPATIENT
Start: 2018-04-17 | End: 2018-04-17 | Stop reason: SURG

## 2018-04-17 RX ADMIN — ACETAMINOPHEN 650 MG: 325 TABLET ORAL at 18:14

## 2018-04-17 RX ADMIN — BENZOCAINE AND MENTHOL 1 LOZENGE: 15; 3.6 LOZENGE ORAL at 21:45

## 2018-04-17 RX ADMIN — HEPARIN SODIUM 7500 UNITS: 1000 INJECTION, SOLUTION INTRAVENOUS; SUBCUTANEOUS at 08:47

## 2018-04-17 RX ADMIN — DEXAMETHASONE SODIUM PHOSPHATE 8 MG: 4 INJECTION, SOLUTION INTRAMUSCULAR; INTRAVENOUS at 08:05

## 2018-04-17 RX ADMIN — CEFAZOLIN SODIUM 2 G: 2 INJECTION, SOLUTION INTRAVENOUS at 07:59

## 2018-04-17 RX ADMIN — ASPIRIN 325 MG: 325 TABLET, DELAYED RELEASE ORAL at 13:14

## 2018-04-17 RX ADMIN — SODIUM CHLORIDE 75 ML/HR: 9 INJECTION, SOLUTION INTRAVENOUS at 12:07

## 2018-04-17 RX ADMIN — ROCURONIUM BROMIDE 10 MG: 10 SOLUTION INTRAVENOUS at 08:49

## 2018-04-17 RX ADMIN — SODIUM CHLORIDE, POTASSIUM CHLORIDE, SODIUM LACTATE AND CALCIUM CHLORIDE: 600; 310; 30; 20 INJECTION, SOLUTION INTRAVENOUS at 07:59

## 2018-04-17 RX ADMIN — Medication 4 MG: at 09:49

## 2018-04-17 RX ADMIN — GLYCOPYRROLATE 0.4 MG: 0.2 INJECTION INTRAMUSCULAR; INTRAVENOUS at 09:49

## 2018-04-17 RX ADMIN — ROCURONIUM BROMIDE 50 MG: 10 SOLUTION INTRAVENOUS at 07:59

## 2018-04-17 RX ADMIN — ONDANSETRON 4 MG: 2 INJECTION INTRAMUSCULAR; INTRAVENOUS at 09:46

## 2018-04-17 RX ADMIN — PROPOFOL 150 MG: 10 INJECTION, EMULSION INTRAVENOUS at 07:59

## 2018-04-17 RX ADMIN — LIDOCAINE HYDROCHLORIDE 50 MG: 10 INJECTION, SOLUTION INFILTRATION; PERINEURAL at 07:59

## 2018-04-17 RX ADMIN — FENTANYL CITRATE 50 MCG: 50 INJECTION, SOLUTION INTRAMUSCULAR; INTRAVENOUS at 07:59

## 2018-04-17 NOTE — PLAN OF CARE
Problem: Patient Care Overview  Goal: Plan of Care Review  Outcome: Ongoing (interventions implemented as appropriate)    Goal: Individualization and Mutuality  Outcome: Ongoing (interventions implemented as appropriate)    Goal: Discharge Needs Assessment  Outcome: Ongoing (interventions implemented as appropriate)    Goal: Interprofessional Rounds/Family Conf  Outcome: Ongoing (interventions implemented as appropriate)      Problem: Cardiac Catheterization (Diagnostic/Interventional) (Adult)  Goal: Signs and Symptoms of Listed Potential Problems Will be Absent, Minimized or Managed (Cardiac Catheterization)  Outcome: Ongoing (interventions implemented as appropriate)

## 2018-04-17 NOTE — ANESTHESIA PROCEDURE NOTES
Airway  Urgency: elective    Date/Time: 4/17/2018 8:02 AM  End Time:4/17/2018 8:02 AM  Airway not difficult    General Information and Staff    Patient location during procedure: OR  CRNA: ERAN JAUREGUI    Indications and Patient Condition  Indications for airway management: airway protection    Preoxygenated: yes  MILS not maintained throughout  Mask difficulty assessment: 1 - vent by mask    Final Airway Details  Final airway type: endotracheal airway      Successful airway: ETT  Cuffed: yes   Successful intubation technique: direct laryngoscopy  Endotracheal tube insertion site: oral  Blade: Kimberly  Blade size: #3  ETT size: 7.0 mm  Cormack-Lehane Classification: grade I - full view of glottis  Placement verified by: chest auscultation and capnometry   Cuff volume (mL): 5  Measured from: lips  ETT to lips (cm): 20  Number of attempts at approach: 1    Additional Comments  Negative epigastric sounds, Breath sound equal bilaterally with symmetric chest rise and fall

## 2018-04-17 NOTE — ANESTHESIA PREPROCEDURE EVALUATION
Anesthesia Evaluation     NPO Solid Status: > 8 hours  NPO Liquid Status: > 8 hours           Airway   Mallampati: II  TM distance: >3 FB  No difficulty expected  Dental    (+) edentulous    Pulmonary     breath sounds clear to auscultation  (-) asthma, not a smoker  Cardiovascular     ECG reviewed  Rhythm: regular  Rate: normal    (+) hypertension,   (-) pacemaker, angina, murmur, cardiac stents, CABG      Neuro/Psych  (+) syncope,     (-) seizures, TIA, CVA  GI/Hepatic/Renal/Endo    (-) liver disease, no renal disease, diabetes    Musculoskeletal     Abdominal    Substance History      OB/GYN          Other        ROS/Med Hx Other: 2-3mm intracranial aneurysm PICA                  Anesthesia Plan    ASA 4     general   (GA  Lorrie  Cardene drip available  Franck drip available)  intravenous induction     Plan discussed with CRNA.

## 2018-04-17 NOTE — PROGRESS NOTES
"Intensive Care Follow-up     Hospital:  LOS: 0 days   Ms. Gloria Willams, 77 y.o. female is followed for:   Intracranial aneurysm        Subjective   Interval History:  Gloria Willams is a 77 y.o. female presented to [facility] for a cerebral angiogram per Dr. Cary.  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  She was referred to Dr. Cary for treatment.      Currently, she is resting in bed, eating her lunch.  She denies pain/headache.  Her family is at the bedside.     The patient's relevant past medical, surgical and social history were reviewed and updated in Epic as appropriate.        Objective     Infusions:    lactated ringers 9 mL/hr    niCARdipine 5-15 mg/hr    sodium chloride 50 mL/hr    sodium chloride 75 mL/hr Last Rate: 75 mL/hr (04/17/18 1207)     Medications:    aspirin 325 mg Oral Daily       Vital Sign Min/Max for last 24 hours  Temp  Min: 97.5 °F (36.4 °C)  Max: 97.8 °F (36.6 °C)   BP  Min: 134/77  Max: 163/69   Pulse  Min: 70  Max: 86   Resp  Min: 14  Max: 16   SpO2  Min: 95 %  Max: 100 %   No Data Recorded       Input/Output for last 24 hour shift  No intake/output data recorded.      Objective:  General Appearance:  Comfortable, well-appearing and in no acute distress.    Vital signs: (most recent): Blood pressure 149/66, pulse 96, temperature 97.5 °F (36.4 °C), temperature source Oral, resp. rate 16, height 157.5 cm (62\"), weight 78.2 kg (172 lb 6.4 oz), SpO2 96 %.  No fever.    Output: Producing urine.    HEENT: Normal HEENT exam.    Lungs:  Normal effort and normal respiratory rate.  Breath sounds clear to auscultation.  She is not in respiratory distress.    Heart: Normal rate.  Regular rhythm.  S1 normal and S2 normal.  No murmur.   Abdomen: Abdomen is soft and non-distended.  Bowel sounds are normal.     Extremities: Normal range of motion.  There is no deformity.    Neurological: Patient is alert.                Results from last " 7 days  Lab Units 04/17/18  0727   WBC 10*3/mm3 4.42   HEMOGLOBIN g/dL 13.4   PLATELETS 10*3/mm3 257       Results from last 7 days  Lab Units 04/17/18  0727   SODIUM mmol/L 137   POTASSIUM mmol/L 4.3   CO2 mmol/L 27.0   BUN mg/dL 19   CREATININE mg/dL 0.90   GLUCOSE mg/dL 99     Estimated Creatinine Clearance: 49.9 mL/min (by C-G formula based on SCr of 0.9 mg/dL).          Images:   none    I reviewed the patient's results and images.     Assessment/Plan   Impression      Principal Problem:    Intracranial aneurysm  Active Problems:    Syncope    Hypothyroid    Hypertension    Arthritis    UTI (urinary tract infection)       Plan        Monitor in ICU  Post-op care per NS  Blood pressure management for SBP<180  GI/DVT prophylaxis     discussed the patient's findings and my recommendations with patient and nursing staff         James Diallo MD, [facility]  Pulmonary and Critical Care Medicine  04/17/18 1:20 PM

## 2018-04-17 NOTE — PROGRESS NOTES
Discharge Planning Assessment  [facility]     Patient Name: lGoria Willams  MRN: [id number]  Today's Date: 4/17/2018    Admit Date: 4/17/2018          Discharge Needs Assessment     Row Name 04/17/18 1518       Living Environment    Lives With alone    Current Living Arrangements home/apartment/condo   Ms. Willams is a  and lives alone in 1 level home in [place]    Primary Care Provided by self    Provides Primary Care For no one    Family Caregiver if Needed none    Quality of Family Relationships unable to assess    Able to Return to Prior Arrangements yes    Living Arrangement Comments --   Her son lives nearby and can assist her at home as needed.       Resource/Environmental Concerns    Transportation Concerns car, none       Transition Planning    Patient/Family Anticipates Transition to home with family    Transportation Anticipated family or friend will provide       Discharge Needs Assessment    Readmission Within the Last 30 Days no previous admission in last 30 days    Concerns to be Addressed adjustment to diagnosis/illness;basic needs    Equipment Currently Used at Home none            Discharge Plan     Row Name 04/17/18 7564       Plan    Plan Home c family    Patient/Family in Agreement with Plan yes    Plan Comments Talked to Ms. Willams at .  She lives in a 1 level home in [facility]. alone.  Her son lives nearby and can assist her as needed.  She is independent c ADL's and does not use DME, HH or Home O2.  Her PCP is Dr. Jamshid Garcia.  She would like to use [facility] when discharged.  She uses [facility] in [facility] for her Rx.  Her insurance covers cost of her medications.  Her goal is to return to her home c assist from her son.  CM will follow.    Final Discharge Disposition Code 01 - home or self-care        Destination     No service coordination in this encounter.      Durable Medical Equipment     No service coordination in this encounter.      Dialysis/Infusion     No service  coordination in this encounter.      Home Medical Care     No service coordination in this encounter.      Social Care     No service coordination in this encounter.                Demographic Summary     Row Name 04/17/18 1437       General Information    Admission Type inpatient    Arrived From PACU/recovery room    Referral Source admission list    Reason for Consult discharge planning    Preferred Language English       Contact Information    Permission Granted to Share Info With             Functional Status     Row Name 04/17/18 1517       Functional Status    Usual Activity Tolerance excellent    Current Activity Tolerance moderate       Functional Status, IADL    Medications independent    Meal Preparation independent    Housekeeping independent    Laundry independent    Shopping independent       Mental Status Summary    Recent Changes in Mental Status/Cognitive Functioning no changes       Employment/    Employment Status retired            Psychosocial    No documentation.           Abuse/Neglect    No documentation.           Legal    No documentation.           Substance Abuse    No documentation.           Patient Forms    No documentation.         Jasmyn Aponte RN

## 2018-04-17 NOTE — H&P
Subjective      Chief Complaint: Intracranial aneurysm     Patient ID: Gloria Willams is a 77 y.o. female seen for consultation today at the request of  Donn Saldana MD     This is a 77-year-old woman with an incidentally discovered PICA aneurysm during workup for an episode of vertigo about a week ago.  She denies any headaches, nausea, vomiting, or strokelike symptoms.  She has no family history of intracranial aneurysms.  Her risk factors for aneurysm include hypertension.  She has never smoked cigarettes.              The following portions of the patient's history were reviewed and updated as appropriate: allergies, current medications, past family history, past medical history, past social history, past surgical history and problem list.     Family history:         Family History   Problem Relation Age of Onset   • Diabetes Mother     • Hypertension Mother     • Heart disease Father           Social history:   Social History            Social History   • Marital status:        Spouse name: N/A   • Number of children: N/A   • Years of education: N/A          Occupational History   • Not on file.           Social History Main Topics   • Smoking status: Never Smoker   • Smokeless tobacco: Never Used   • Alcohol use No   • Drug use: No   • Sexual activity: Defer           Other Topics Concern   • Not on file          Social History Narrative   • No narrative on file         Review of Systems   Constitutional: Negative for activity change, appetite change, chills, diaphoresis, fatigue, fever and unexpected weight change.   HENT: Negative for congestion, dental problem, drooling, ear discharge, ear pain, facial swelling, hearing loss, mouth sores, nosebleeds, postnasal drip, rhinorrhea, sinus pressure, sneezing, sore throat, tinnitus, trouble swallowing and voice change.    Eyes: Negative for photophobia, pain, discharge, redness, itching and visual disturbance.   Respiratory: Negative for apnea, cough,  "choking, chest tightness, shortness of breath, wheezing and stridor.    Cardiovascular: Negative for chest pain, palpitations and leg swelling.   Gastrointestinal: Negative for abdominal distention, abdominal pain, anal bleeding, blood in stool, constipation, diarrhea, nausea, rectal pain and vomiting.   Endocrine: Negative for cold intolerance, heat intolerance, polydipsia, polyphagia and polyuria.   Genitourinary: Negative for decreased urine volume, difficulty urinating, dysuria, enuresis, flank pain, frequency, genital sores, hematuria and urgency.   Musculoskeletal: Negative for arthralgias, back pain, gait problem, joint swelling, myalgias, neck pain and neck stiffness.   Skin: Negative for color change, pallor, rash and wound.   Allergic/Immunologic: Negative for environmental allergies, food allergies and immunocompromised state.   Neurological: Positive for dizziness and light-headedness. Negative for tremors, seizures, syncope, facial asymmetry, speech difficulty, weakness, numbness and headaches.   Hematological: Negative for adenopathy. Does not bruise/bleed easily.   Psychiatric/Behavioral: Negative for agitation, behavioral problems, confusion, decreased concentration, dysphoric mood, hallucinations, self-injury, sleep disturbance and suicidal ideas. The patient is not nervous/anxious and is not hyperactive.          Objective   Blood pressure 122/80, temperature 98 °F (36.7 °C), height 157.5 cm (62.01\"), weight 78.5 kg (173 lb).  Body mass index is 31.63 kg/m².     Physical Exam   Constitutional: She is oriented to person, place, and time. She appears well-developed and well-nourished.  Non-toxic appearance. No distress.   HENT:   Head: Normocephalic and atraumatic.   Mouth/Throat: Oropharynx is clear and moist.   Eyes: EOM are normal. Pupils are equal, round, and reactive to light. Right eye exhibits no discharge. Left eye exhibits no discharge.   Neck: Phonation normal. No JVD present. No tracheal " deviation present. No thyromegaly present.   Cardiovascular: Normal rate and regular rhythm.    Pulmonary/Chest: Effort normal. No stridor. No respiratory distress. She has no wheezes.   Abdominal: Soft. Normal appearance. She exhibits no distension. There is no tenderness.   Musculoskeletal: She exhibits no edema.   Muscle Group     L          R  Deltoid                5          5  Bicep                  5          5  Tricep                 5          5                       5          5  Hand IO              5          5  Hip Flexor           5          5  Knee Extensor   5          5     ADF                    5          5  APF                    5          5      Neurological: She is alert and oriented to person, place, and time. She has normal reflexes. She displays normal reflexes. No cranial nerve deficit or sensory deficit. She exhibits normal muscle tone. She displays a negative Romberg sign. Coordination and gait normal. GCS eye subscore is 4. GCS verbal subscore is 5. GCS motor subscore is 6.   Reflex Scores:       Tricep reflexes are 2+ on the right side and 2+ on the left side.       Bicep reflexes are 2+ on the right side and 2+ on the left side.       Brachioradialis reflexes are 2+ on the right side and 2+ on the left side.       Patellar reflexes are 2+ on the right side and 2+ on the left side.       Achilles reflexes are 2+ on the right side and 2+ on the left side.  CN II-XII grossly intact.    No pronator drift. FTN testing performed without dysmetria or bradykinesia.   Skin: Skin is warm and dry. She is not diaphoretic. No erythema.   Psychiatric: She has a normal mood and affect. Her speech is normal and behavior is normal. Judgment and thought content normal.   Nursing note and vitals reviewed.           Assessment/Plan      Independent Review of Radiographic Studies:       Available for my review is a MRA of the brain that was performed on 3/13/18.  This discloses a large aneurysm  arising from the right PICA measuring at least 17 mm.     Medical Decision Making:       This is a 77-year-old woman with an incidentally discovered right PICA aneurysm following workup for vertigo.  She needs a diagnostic cerebral angiogram, and she will probably ultimately need some form of treatment.  A discussion of clipping versus coiling will be undertaken after her angiogram has been performed.     Informed consent was obtained for a cerebral angiogram and possible intervention. She acknowledges a risk of stroke, coma, death, pain, paralysis, blindness, permanent neurological disability, access complications, hematoma, limb ischemia, renal failure, allergic reaction, radiation exposure, hair loss, tumor growth, failure of benefit of the procedure, or need for additional procedures. All questions were answered. No guarantees were given or implied. She elects to proceed.     We will schedule her for an elective angiogram in the near future.  I did review the signs and symptoms of subarachnoid hemorrhage with her, and I directed her to contact my office with new, or worsening symptoms.  I told her to call 911 if she thinks she is having a stroke.     Gloria was seen today for cerebral aneurysm.     Diagnoses and all orders for this visit:     Intracranial aneurysm

## 2018-04-17 NOTE — ANESTHESIA POSTPROCEDURE EVALUATION
Patient: Gloria Willams    Procedure Summary     Date:  04/17/18 Room / Location:  [facility] LAB [facility] /  [facility] CATH INVASIVE LOCATION    Anesthesia Start:  0759 Anesthesia Stop:  1010    Procedures:       IR intracranial embolization (N/A )      IR internal carotid with angiography (Bilateral )      IR vertebral artery with angiography (Bilateral ) Diagnosis:       Intracranial aneurysm      (Intracranial aneurysm [I67.1])    Provider:  Jonas Cary MD Provider:  Jean Carlos Tabor MD    Anesthesia Type:  general ASA Status:  4          Anesthesia Type: general  Last vitals  BP   159/68 (04/17/18 1045)   Temp   97.8 °F (36.6 °C) (04/17/18 1045)   Pulse   78 (04/17/18 1045)   Resp   16 (04/17/18 1045)     SpO2   100 % (04/17/18 1045)     Post Anesthesia Care and Evaluation    Patient location during evaluation: PACU  Patient participation: complete - patient participated  Level of consciousness: awake and alert  Pain score: 0  Pain management: adequate  Airway patency: patent  Anesthetic complications: No anesthetic complications  PONV Status: none  Cardiovascular status: hemodynamically stable and acceptable  Respiratory status: nonlabored ventilation, acceptable and nasal cannula  Hydration status: acceptable

## 2018-04-17 NOTE — ANESTHESIA PROCEDURE NOTES
Arterial Line    Patient location during procedure: OR   Performed By   Anesthesiologist: REG ELIZABETH  Preanesthetic Checklist  Completed: patient identified, site marked, surgical consent, pre-op evaluation, timeout performed, IV checked, risks and benefits discussed and monitors and equipment checked  Arterial Line Prep   Sterile Tech: cap, gloves and sterile barriers  Prep: ChloraPrep  Patient monitoring: EKG, continuous pulse oximetry and blood pressure monitoring  Arterial Line Procedure   Laterality:left  Location:  radial artery  Catheter size: 20 G   Number of attempts: 1  Successful placement: yes          Post Assessment   Dressing Type: wrist guard applied, secured with tape and occlusive dressing applied.   Complications no  Circ/Move/Sens Assessment: normal and unchanged.   Patient Tolerance: patient tolerated the procedure well with no apparent complications

## 2018-04-18 VITALS
DIASTOLIC BLOOD PRESSURE: 69 MMHG | SYSTOLIC BLOOD PRESSURE: 169 MMHG | TEMPERATURE: 97.8 F | HEIGHT: 62 IN | WEIGHT: 172.4 LBS | BODY MASS INDEX: 31.73 KG/M2 | OXYGEN SATURATION: 94 % | HEART RATE: 61 BPM | RESPIRATION RATE: 17 BRPM

## 2018-04-18 LAB
ANION GAP SERPL CALCULATED.3IONS-SCNC: 7 MMOL/L (ref 3–11)
BASOPHILS # BLD AUTO: 0 10*3/MM3 (ref 0–0.2)
BASOPHILS NFR BLD AUTO: 0 % (ref 0–1)
BUN BLD-MCNC: 13 MG/DL (ref 9–23)
BUN/CREAT SERPL: 18.6 (ref 7–25)
CALCIUM SPEC-SCNC: 8.5 MG/DL (ref 8.7–10.4)
CHLORIDE SERPL-SCNC: 105 MMOL/L (ref 99–109)
CO2 SERPL-SCNC: 24 MMOL/L (ref 20–31)
CREAT BLD-MCNC: 0.7 MG/DL (ref 0.6–1.3)
DEPRECATED RDW RBC AUTO: 46.1 FL (ref 37–54)
EOSINOPHIL # BLD AUTO: 0 10*3/MM3 (ref 0–0.3)
EOSINOPHIL NFR BLD AUTO: 0 % (ref 0–3)
ERYTHROCYTE [DISTWIDTH] IN BLOOD BY AUTOMATED COUNT: 13.2 % (ref 11.3–14.5)
GFR SERPL CREATININE-BSD FRML MDRD: 81 ML/MIN/1.73
GLUCOSE BLD-MCNC: 113 MG/DL (ref 70–100)
HCT VFR BLD AUTO: 35.3 % (ref 34.5–44)
HGB BLD-MCNC: 11.5 G/DL (ref 11.5–15.5)
IMM GRANULOCYTES # BLD: 0.01 10*3/MM3 (ref 0–0.03)
IMM GRANULOCYTES NFR BLD: 0.2 % (ref 0–0.6)
LYMPHOCYTES # BLD AUTO: 0.61 10*3/MM3 (ref 0.6–4.8)
LYMPHOCYTES NFR BLD AUTO: 9.9 % (ref 24–44)
MCH RBC QN AUTO: 31.2 PG (ref 27–31)
MCHC RBC AUTO-ENTMCNC: 32.6 G/DL (ref 32–36)
MCV RBC AUTO: 95.7 FL (ref 80–99)
MONOCYTES # BLD AUTO: 0.39 10*3/MM3 (ref 0–1)
MONOCYTES NFR BLD AUTO: 6.3 % (ref 0–12)
NEUTROPHILS # BLD AUTO: 5.16 10*3/MM3 (ref 1.5–8.3)
NEUTROPHILS NFR BLD AUTO: 83.8 % (ref 41–71)
PLATELET # BLD AUTO: 264 10*3/MM3 (ref 150–450)
PMV BLD AUTO: 11.4 FL (ref 6–12)
POTASSIUM BLD-SCNC: 4.2 MMOL/L (ref 3.5–5.5)
RBC # BLD AUTO: 3.69 10*6/MM3 (ref 3.89–5.14)
SODIUM BLD-SCNC: 136 MMOL/L (ref 132–146)
WBC NRBC COR # BLD: 6.16 10*3/MM3 (ref 3.5–10.8)

## 2018-04-18 PROCEDURE — 99238 HOSP IP/OBS DSCHRG MGMT 30/<: CPT | Performed by: PHYSICIAN ASSISTANT

## 2018-04-18 PROCEDURE — 85025 COMPLETE CBC W/AUTO DIFF WBC: CPT | Performed by: NEUROLOGICAL SURGERY

## 2018-04-18 PROCEDURE — 80048 BASIC METABOLIC PNL TOTAL CA: CPT | Performed by: NEUROLOGICAL SURGERY

## 2018-04-18 RX ORDER — CLOPIDOGREL BISULFATE 75 MG/1
75 TABLET ORAL DAILY
Qty: 30 TABLET | Refills: 6 | Status: CANCELLED | OUTPATIENT
Start: 2018-04-18

## 2018-04-18 RX ADMIN — SODIUM CHLORIDE 75 ML/HR: 9 INJECTION, SOLUTION INTRAVENOUS at 00:14

## 2018-04-18 RX ADMIN — ASPIRIN 325 MG: 325 TABLET, DELAYED RELEASE ORAL at 08:09

## 2018-04-18 RX ADMIN — ACETAMINOPHEN 650 MG: 325 TABLET ORAL at 01:39

## 2018-04-18 NOTE — PROGRESS NOTES
Multidisciplinary Rounds- Clinical Nutrition Note    Time: 20min  Patient Name: Gloria Willams  Date of Encounter: 04/18/18 10:29 AM  MRN: [id number]  Admission date: 4/17/2018      Reason for visit: MDR.      Additional information obtained during MDR:  RN reports pt doing well after embolization of aneurysm; anticipates dc later today.    Current diet: Diet Regular    EHR reviewed     Intervention:  Follow treatment plan  Care plan reviewed    Follow up:   RD to follow per protocol      Evie Santos RD  10:29 AM

## 2018-04-18 NOTE — DISCHARGE SUMMARY
Date of Discharge:  4/18/2018    Discharge Diagnosis: PICA ANZ embolization    Procedures Performed  Procedure(s):  IR intracranial embolization  IR internal carotid with angiography  IR vertebral artery with angiography       Consults:   Consults     No orders found for last 30 day(s).          Presenting Problem/History of Present Illness  Intracranial aneurysm [I67.1]  Intracranial aneurysm [I67.1]     Hospital Course  Patient is a 77 y.o. female presented with episode of vertigo about a week ago.  She denied any headaches, nausea, vomiting, or strokelike symptoms.  An incidental PICA ANZ was noted. Patient was deemed surgical candidate by Dr. Deon Cary.  On 4/17/2018.  Patient was admitted to the Cath Lab for an embolization of the PICA aneurysm.  Procedure went without event and patient had no neurologic change overnight.  Patient's groin is without evidence of hematoma.  Patient is ambulating, taking food by mouth and voiding appropriately.    Condition on Discharge:  She is to avoid heavy lifting, bending and twisting.  Patient will follow up with Dr. Cary in 2 weeks.    Discharge Disposition  Home or Self Care    Discharge Medications   Gloria Willams   Home Medication Instructions RADHA:[id number]    Printed on:04/18/18 4520   Medication Information                      aspirin 325 MG EC tablet  Take 1 tablet by mouth Daily.             escitalopram (LEXAPRO) 10 MG tablet  Take 10 mg by mouth Daily.             levothyroxine (SYNTHROID, LEVOTHROID) 88 MCG tablet  Take 88 mcg by mouth Daily.             losartan (COZAAR) 100 MG tablet  Take 100 mg by mouth daily.             meloxicam (MOBIC) 7.5 MG tablet  Take 7.5 mg by mouth daily.                 Activity at Discharge:   Patient is to avoid heavy lifting, bending and twisting.  Patient will follow up in 2 weeks for groin check.  Follow-up Appointments  Future Appointments  Date Time Provider Department Center   5/2/2018 11:45 AM Jonas Chavarria  MD Luis Daniel [facility] None         Test Results Pending at Discharge       Royal Garcia PA-C  04/18/18  4:13 PM

## 2018-04-18 NOTE — PLAN OF CARE
Problem: Patient Care Overview  Goal: Plan of Care Review  Outcome: Ongoing (interventions implemented as appropriate)      Problem: Cardiac Catheterization (Diagnostic/Interventional) (Adult)  Goal: Signs and Symptoms of Listed Potential Problems Will be Absent, Minimized or Managed (Cardiac Catheterization)  Outcome: Ongoing (interventions implemented as appropriate)

## 2018-04-19 LAB
BUN BLDA-MCNC: 21 MG/DL (ref 8–26)
CA-I BLDA-SCNC: 1.25 MMOL/L (ref 1.2–1.32)
CHLORIDE BLDA-SCNC: 102 MMOL/L (ref 98–109)
CO2 BLDA-SCNC: 28 MMOL/L (ref 24–29)
CREAT BLDA-MCNC: 0.9 MG/DL (ref 0.6–1.3)
GLUCOSE BLDC GLUCOMTR-MCNC: 96 MG/DL (ref 70–130)
HCT VFR BLDA CALC: 40 % (ref 38–51)
HGB BLDA-MCNC: 13.6 G/DL (ref 12–17)
POTASSIUM BLDA-SCNC: 4.3 MMOL/L (ref 3.5–4.9)
SODIUM BLDA-SCNC: 139 MMOL/L (ref 138–146)

## 2018-05-02 ENCOUNTER — OFFICE VISIT (OUTPATIENT)
Dept: NEUROSURGERY | Facility: CLINIC | Age: 78
End: 2018-05-02

## 2018-05-02 VITALS
SYSTOLIC BLOOD PRESSURE: 128 MMHG | DIASTOLIC BLOOD PRESSURE: 70 MMHG | TEMPERATURE: 97.5 F | BODY MASS INDEX: 31.21 KG/M2 | HEIGHT: 62 IN | WEIGHT: 169.6 LBS

## 2018-05-02 DIAGNOSIS — G45.8 SUBCLAVIAN STEAL SYNDROME: ICD-10-CM

## 2018-05-02 DIAGNOSIS — I67.1 INTRACRANIAL ANEURYSM: Primary | ICD-10-CM

## 2018-05-02 PROCEDURE — 99214 OFFICE O/P EST MOD 30 MIN: CPT | Performed by: NEUROLOGICAL SURGERY

## 2018-05-02 RX ORDER — COLESTIPOL HYDROCHLORIDE 5 G/5G
5 GRANULE, FOR SUSPENSION ORAL AS NEEDED
Refills: 5 | COMMUNITY
Start: 2018-04-06 | End: 2020-01-15

## 2018-05-02 RX ORDER — LOSARTAN POTASSIUM 50 MG/1
50 TABLET ORAL DAILY
COMMUNITY
Start: 2018-04-29 | End: 2020-12-15 | Stop reason: HOSPADM

## 2018-05-02 RX ORDER — ESCITALOPRAM OXALATE 20 MG/1
20 TABLET ORAL DAILY
Refills: 1 | COMMUNITY
Start: 2018-03-14 | End: 2020-01-15

## 2018-05-02 NOTE — PROGRESS NOTES
Subjective     Chief Complaint: Follow-up intracranial aneurysm, subclavian steal syndrome    Patient ID: Gloria Willams is a 77 y.o. female is here today for follow-up.    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  She denies any headaches, nausea, vomiting, or strokelike symptoms.  She has no family history of intracranial aneurysms.  Her risk factors for aneurysm include hypertension.  She has never smoked cigarettes.        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  During her angiogram, I discovered that she had severe left subclavian stenosis, and angiographic evidence of a subclavian steal syndrome.  She presents today to discuss her follow-up.    She reports no new symptoms since her procedure.  She is still having dizziness with exertion.  She denies any headaches, nausea, vomiting, or strokelike symptoms.    The following portions of the patient's history were reviewed and updated as appropriate: allergies, current medications, past family history, past medical history, past social history, past surgical history and problem list.    Family history:   Family History   Problem Relation Age of Onset   • Diabetes Mother    • Hypertension Mother    • Heart disease Father        Social history:   Social History     Social History   • Marital status:      Spouse name: N/A   • Number of children: N/A   • Years of education: N/A     Occupational History   • Not on file.     Social History Main Topics   • Smoking status: Never Smoker   • Smokeless tobacco: Never Used   • Alcohol use No   • Drug use: No   • Sexual activity: Defer     Other Topics Concern   • Not on file     Social History Narrative   • No narrative on file       Review of Systems   Constitutional: Negative for activity change, appetite change, chills, diaphoresis, fatigue, fever and  unexpected weight change.   HENT: Negative for congestion, dental problem, drooling, ear discharge, ear pain, facial swelling, hearing loss, mouth sores, nosebleeds, postnasal drip, rhinorrhea, sinus pressure, sneezing, sore throat, tinnitus, trouble swallowing and voice change.    Eyes: Negative for photophobia, pain, discharge, redness, itching and visual disturbance.   Respiratory: Negative for apnea, cough, choking, chest tightness, shortness of breath, wheezing and stridor.    Cardiovascular: Negative for chest pain, palpitations and leg swelling.   Gastrointestinal: Negative for abdominal distention, abdominal pain, anal bleeding, blood in stool, constipation, diarrhea, nausea, rectal pain and vomiting.   Endocrine: Negative for cold intolerance, heat intolerance, polydipsia, polyphagia and polyuria.   Genitourinary: Negative for decreased urine volume, difficulty urinating, dysuria, enuresis, flank pain, frequency, genital sores, hematuria and urgency.   Musculoskeletal: Negative for arthralgias, back pain, gait problem, joint swelling, myalgias, neck pain and neck stiffness.   Skin: Negative for color change, pallor, rash and wound.   Allergic/Immunologic: Negative for environmental allergies, food allergies and immunocompromised state.   Neurological: Positive for light-headedness (occassionally). Negative for dizziness, tremors, seizures, syncope, facial asymmetry, speech difficulty, weakness, numbness and headaches.   Hematological: Negative for adenopathy. Does not bruise/bleed easily.   Psychiatric/Behavioral: Negative for agitation, behavioral problems, confusion, decreased concentration, dysphoric mood, hallucinations, self-injury, sleep disturbance and suicidal ideas. The patient is not nervous/anxious and is not hyperactive.    All other systems reviewed and are negative.      Objective   Blood pressure 128/70, temperature 97.5 °F (36.4 °C), temperature source Temporal Artery , height 157.5 cm  "(62\"), weight 76.9 kg (169 lb 9.6 oz).  Body mass index is 31.02 kg/m².    Physical Exam   Constitutional: She is oriented to person, place, and time. She appears well-developed and well-nourished.  Non-toxic appearance. No distress.   HENT:   Head: Normocephalic and atraumatic.   Mouth/Throat: Oropharynx is clear and moist.   Eyes: EOM are normal. Pupils are equal, round, and reactive to light. Right eye exhibits no discharge. Left eye exhibits no discharge.   Neck: Phonation normal. No JVD present. No tracheal deviation present. No thyromegaly present.   Cardiovascular: Normal rate and regular rhythm.    Pulmonary/Chest: Effort normal. No stridor. No respiratory distress. She has no wheezes.   Abdominal: Soft. Normal appearance. She exhibits no distension. There is no tenderness.   Musculoskeletal: She exhibits no edema.   Muscle Group     L          R  Deltoid                5          5  Bicep                  5          5  Tricep                 5          5                       5          5  Hand IO              5          5  Hip Flexor           5          5  Knee Extensor   5          5     ADF                    5          5  APF                    5          5      Neurological: She is alert and oriented to person, place, and time. She has normal reflexes. She displays normal reflexes. No cranial nerve deficit or sensory deficit. She exhibits normal muscle tone. She displays a negative Romberg sign. Coordination and gait normal. GCS eye subscore is 4. GCS verbal subscore is 5. GCS motor subscore is 6.   Reflex Scores:       Tricep reflexes are 2+ on the right side and 2+ on the left side.       Bicep reflexes are 2+ on the right side and 2+ on the left side.       Brachioradialis reflexes are 2+ on the right side and 2+ on the left side.       Patellar reflexes are 2+ on the right side and 2+ on the left side.       Achilles reflexes are 2+ on the right side and 2+ on the left side.  CN II-XII grossly " intact.    No pronator drift. FTN testing performed without dysmetria or bradykinesia.   Skin: Skin is warm and dry. She is not diaphoretic. No erythema.   Psychiatric: She has a normal mood and affect. Her speech is normal and behavior is normal. Judgment and thought content normal.   Nursing note and vitals reviewed.        Assessment/Plan     Independent Review of Radiographic Studies:      She has no new imaging for me to review    Medical Decision Making:      This is a 77-year-old woman who underwent a coiling of a partially thrombosed right PICA aneurysm approximately 2 weeks ago.  I will schedule her for a repeat MRA of the brain with and without contrast in about 6 weeks.  If her aneurysm is occluded on that study, then we will schedule her for an angiogram under conscious sedation with planned stenting of her subclavian artery at that time.  She will need to start aspirin and Plavix prior to her stenting procedure.    If there is evidence of residual or recurrent aneurysm on her MRA, then we will plan for a diagnostic angiogram under general anesthesia with intent to coil embolized her PICA aneurysm.  I will follow up with her in 6 weeks after her MRA.  I reviewed the signs and symptoms of subarachnoid hemorrhage with her, and I directed her to contact my office with new, or worsening symptoms.    Gloria was seen today for post-op.    Diagnoses and all orders for this visit:    Intracranial aneurysm  -     MRI Angiogram Head With & Without Contrast    Subclavian steal syndrome        Return in about 6 weeks (around 6/13/2018).           This document signed by JUSTIN Cary MD May 2, 2018 11:39 AM

## 2018-06-13 ENCOUNTER — RESULTS ENCOUNTER (OUTPATIENT)
Dept: NEUROSURGERY | Facility: CLINIC | Age: 78
End: 2018-06-13

## 2018-06-13 ENCOUNTER — OFFICE VISIT (OUTPATIENT)
Dept: NEUROSURGERY | Facility: CLINIC | Age: 78
End: 2018-06-13

## 2018-06-13 ENCOUNTER — HOSPITAL ENCOUNTER (OUTPATIENT)
Dept: MRI IMAGING | Facility: HOSPITAL | Age: 78
Discharge: HOME | End: 2018-06-13
Attending: NEUROLOGICAL SURGERY | Admitting: NEUROLOGICAL SURGERY

## 2018-06-13 ENCOUNTER — APPOINTMENT (OUTPATIENT)
Dept: MRI IMAGING | Facility: HOSPITAL | Age: 78
End: 2018-06-13
Attending: NEUROLOGICAL SURGERY

## 2018-06-13 VITALS
SYSTOLIC BLOOD PRESSURE: 118 MMHG | HEIGHT: 62 IN | WEIGHT: 167 LBS | TEMPERATURE: 97.5 F | BODY MASS INDEX: 30.73 KG/M2 | DIASTOLIC BLOOD PRESSURE: 84 MMHG

## 2018-06-13 DIAGNOSIS — I67.1 INTRACRANIAL ANEURYSM: Primary | ICD-10-CM

## 2018-06-13 DIAGNOSIS — G45.8 SUBCLAVIAN STEAL SYNDROME: ICD-10-CM

## 2018-06-13 DIAGNOSIS — I67.1 INTRACRANIAL ANEURYSM: ICD-10-CM

## 2018-06-13 PROCEDURE — A9577 INJ MULTIHANCE: HCPCS | Performed by: NEUROLOGICAL SURGERY

## 2018-06-13 PROCEDURE — 70546 MR ANGIOGRAPH HEAD W/O&W/DYE: CPT

## 2018-06-13 PROCEDURE — 99214 OFFICE O/P EST MOD 30 MIN: CPT | Performed by: NEUROLOGICAL SURGERY

## 2018-06-13 PROCEDURE — 0 GADOBENATE DIMEGLUMINE 529 MG/ML SOLUTION: Performed by: NEUROLOGICAL SURGERY

## 2018-06-13 RX ORDER — SODIUM CHLORIDE 9 MG/ML
50 INJECTION, SOLUTION INTRAVENOUS CONTINUOUS
Status: CANCELLED | OUTPATIENT
Start: 2018-06-13

## 2018-06-13 RX ORDER — SODIUM CHLORIDE 0.9 % (FLUSH) 0.9 %
1-10 SYRINGE (ML) INJECTION AS NEEDED
Status: CANCELLED | OUTPATIENT
Start: 2018-06-13

## 2018-06-13 RX ADMIN — GADOBENATE DIMEGLUMINE 15 ML: 529 INJECTION, SOLUTION INTRAVENOUS at 11:00

## 2018-06-13 NOTE — PROGRESS NOTES
Subjective     Chief Complaint: Follow-up intracranial aneurysm, subclavian steal syndrome    Patient ID: Gloria Willams is a 78 y.o. female is here today for follow-up.    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  She denies any headaches, nausea, vomiting, or strokelike symptoms.  She has no family history of intracranial aneurysms.  Her risk factors for aneurysm include hypertension.  She has never smoked cigarettes.        This is a 78-year-old woman with an incidentally discovered PICA aneurysm.  This was treated with a coil embolization several weeks ago, however at the time of her embolization, the majority of the aneurysm had thrombosed.  This made definitive treatment of her aneurysm and possible.  I ordered a repeat MRA of the brain and she presents today to discuss the results of the study.  She reports no change in her symptoms.  She is still endorsing lightheadedness, and symptoms consistent with an untreated subclavian steal syndrome.    The following portions of the patient's history were reviewed and updated as appropriate: allergies, current medications, past family history, past medical history, past social history, past surgical history and problem list.    Family history:   Family History   Problem Relation Age of Onset   • Diabetes Mother    • Hypertension Mother    • Heart disease Father        Social history:   Social History     Social History   • Marital status:      Spouse name: N/A   • Number of children: N/A   • Years of education: N/A     Occupational History   • Not on file.     Social History Main Topics   • Smoking status: Never Smoker   • Smokeless tobacco: Never Used   • Alcohol use No   • Drug use: No   • Sexual activity: Defer     Other Topics Concern   • Not on file     Social History Narrative   • No narrative on file       Review of Systems   Constitutional: Negative for activity  change, appetite change, chills, diaphoresis, fatigue, fever and unexpected weight change.   HENT: Negative for congestion, dental problem, drooling, ear discharge, ear pain, facial swelling, hearing loss, mouth sores, nosebleeds, postnasal drip, rhinorrhea, sinus pressure, sneezing, sore throat, tinnitus, trouble swallowing and voice change.    Eyes: Negative for photophobia, pain, discharge, redness, itching and visual disturbance.   Respiratory: Negative for apnea, cough, choking, chest tightness, shortness of breath, wheezing and stridor.    Cardiovascular: Negative for chest pain, palpitations and leg swelling.   Gastrointestinal: Negative for abdominal distention, abdominal pain, anal bleeding, blood in stool, constipation, diarrhea, nausea, rectal pain and vomiting.   Endocrine: Negative for cold intolerance, heat intolerance, polydipsia, polyphagia and polyuria.   Genitourinary: Negative for decreased urine volume, difficulty urinating, dysuria, enuresis, flank pain, frequency, genital sores, hematuria and urgency.   Musculoskeletal: Negative for arthralgias, back pain, gait problem, joint swelling, myalgias, neck pain and neck stiffness.   Skin: Negative for color change, pallor, rash and wound.   Allergic/Immunologic: Negative for environmental allergies, food allergies and immunocompromised state.   Neurological: Positive for light-headedness (very little). Negative for dizziness, tremors, seizures, syncope, facial asymmetry, speech difficulty, weakness, numbness and headaches.   Hematological: Negative for adenopathy. Does not bruise/bleed easily.   Psychiatric/Behavioral: Negative for agitation, behavioral problems, confusion, decreased concentration, dysphoric mood, hallucinations, self-injury, sleep disturbance and suicidal ideas. The patient is not nervous/anxious and is not hyperactive.    All other systems reviewed and are negative.      Objective   Blood pressure 118/84, temperature 97.5 °F (36.4  "°C), temperature source Temporal Artery , height 157.5 cm (62\"), weight 75.8 kg (167 lb).  Body mass index is 30.54 kg/m².    Physical Exam   Constitutional: She is oriented to person, place, and time. She appears well-developed and well-nourished.  Non-toxic appearance. No distress.   HENT:   Head: Normocephalic and atraumatic.   Mouth/Throat: Oropharynx is clear and moist.   Eyes: EOM are normal. Pupils are equal, round, and reactive to light. Right eye exhibits no discharge. Left eye exhibits no discharge.   Neck: Phonation normal. No JVD present. No tracheal deviation present. No thyromegaly present.   Cardiovascular: Normal rate and regular rhythm.    Pulmonary/Chest: Effort normal. No stridor. No respiratory distress. She has no wheezes.   Abdominal: Soft. Normal appearance. She exhibits no distension. There is no tenderness.   Musculoskeletal: She exhibits no edema.   Muscle Group     L          R  Deltoid                5          5  Bicep                  5          5  Tricep                 5          5                       5          5  Hand IO              5          5  Hip Flexor           5          5  Knee Extensor   5          5     ADF                    5          5  APF                    5          5      Neurological: She is alert and oriented to person, place, and time. She has normal reflexes. She displays normal reflexes. No cranial nerve deficit or sensory deficit. She exhibits normal muscle tone. She displays a negative Romberg sign. Coordination and gait normal. GCS eye subscore is 4. GCS verbal subscore is 5. GCS motor subscore is 6.   Reflex Scores:       Tricep reflexes are 2+ on the right side and 2+ on the left side.       Bicep reflexes are 2+ on the right side and 2+ on the left side.       Brachioradialis reflexes are 2+ on the right side and 2+ on the left side.       Patellar reflexes are 2+ on the right side and 2+ on the left side.       Achilles reflexes are 2+ on the " right side and 2+ on the left side.  CN II-XII grossly intact.    No pronator drift. FTN testing performed without dysmetria or bradykinesia.   Skin: Skin is warm and dry. She is not diaphoretic. No erythema.   Psychiatric: She has a normal mood and affect. Her speech is normal and behavior is normal. Judgment and thought content normal.   Nursing note and vitals reviewed.        Assessment/Plan     Independent Review of Radiographic Studies:      Available for my review is a MRA of the brain with and without contrast that was performed today, 6/13/18.  There is filling of the large right PICA aneurysm measuring approximately 18 x 16 mm.  There is susceptibility artifact seen within the aneurysm sac which is consistent with the placement of the coil.    Medical Decision Making:      This is a 78-year-old woman with a large PICA aneurysm that was unsuccessfully coiled several weeks ago.  She will need another diagnostic angiogram under general anesthesia with intent to coil her aneurysm.  We may or may not treat her subclavian steal with a stent at that time, however my priority at this point would be to treat the aneurysm.  Informed consent for a diagnostic angiogram with coil embolization of her PICA aneurysm was once again obtained from the patient.  All questions were answered.  The attendant risks and benefits were acknowledged by the patient and her daughter.  They elect to proceed.  No guarantees were given or implied.  We will schedule her on an elective basis for an angiogram under anesthesia with intent to coil her aneurysm in the near future.    Gloria was seen today for follow-up and cerebral aneurysm.    Diagnoses and all orders for this visit:    Intracranial aneurysm    Subclavian steal syndrome        No Follow-up on file.           This document signed by JUSTIN Cary MD June 13, 2018 2:18 PM

## 2018-06-13 NOTE — H&P
Subjective     Chief Complaint: Follow-up intracranial aneurysm, subclavian steal syndrome    Patient ID: Gloria Willams is a 78 y.o. female is here today for follow-up.    This is a 77-year-old woman with an incidentally discovered PICA aneurysm during workup for an episode of vertigo.  She denies any headaches, nausea, vomiting, or strokelike symptoms.  She has no family history of intracranial aneurysms.  Her risk factors for aneurysm include hypertension.  She has never smoked cigarettes.        This is a 78-year-old woman with an incidentally discovered PICA aneurysm.  This was treated with a coil embolization several weeks ago, however at the time of her embolization, the majority of the aneurysm had thrombosed.  This made definitive treatment of her aneurysm and possible.  I ordered a repeat MRA of the brain and she presents today to discuss the results of the study.  She reports no change in her symptoms.  She is still endorsing lightheadedness, and symptoms consistent with an untreated subclavian steal syndrome.    The following portions of the patient's history were reviewed and updated as appropriate: allergies, current medications, past family history, past medical history, past social history, past surgical history and problem list.    Family history:   Family History   Problem Relation Age of Onset   • Diabetes Mother    • Hypertension Mother    • Heart disease Father        Social history:   Social History     Social History   • Marital status:      Spouse name: N/A   • Number of children: N/A   • Years of education: N/A     Occupational History   • Not on file.     Social History Main Topics   • Smoking status: Never Smoker   • Smokeless tobacco: Never Used   • Alcohol use No   • Drug use: No   • Sexual activity: Defer     Other Topics Concern   • Not on file     Social History Narrative   • No narrative on file       Review of Systems   Constitutional: Negative for activity change, appetite  change, chills, diaphoresis, fatigue, fever and unexpected weight change.   HENT: Negative for congestion, dental problem, drooling, ear discharge, ear pain, facial swelling, hearing loss, mouth sores, nosebleeds, postnasal drip, rhinorrhea, sinus pressure, sneezing, sore throat, tinnitus, trouble swallowing and voice change.    Eyes: Negative for photophobia, pain, discharge, redness, itching and visual disturbance.   Respiratory: Negative for apnea, cough, choking, chest tightness, shortness of breath, wheezing and stridor.    Cardiovascular: Negative for chest pain, palpitations and leg swelling.   Gastrointestinal: Negative for abdominal distention, abdominal pain, anal bleeding, blood in stool, constipation, diarrhea, nausea, rectal pain and vomiting.   Endocrine: Negative for cold intolerance, heat intolerance, polydipsia, polyphagia and polyuria.   Genitourinary: Negative for decreased urine volume, difficulty urinating, dysuria, enuresis, flank pain, frequency, genital sores, hematuria and urgency.   Musculoskeletal: Negative for arthralgias, back pain, gait problem, joint swelling, myalgias, neck pain and neck stiffness.   Skin: Negative for color change, pallor, rash and wound.   Allergic/Immunologic: Negative for environmental allergies, food allergies and immunocompromised state.   Neurological: Positive for light-headedness (very little). Negative for dizziness, tremors, seizures, syncope, facial asymmetry, speech difficulty, weakness, numbness and headaches.   Hematological: Negative for adenopathy. Does not bruise/bleed easily.   Psychiatric/Behavioral: Negative for agitation, behavioral problems, confusion, decreased concentration, dysphoric mood, hallucinations, self-injury, sleep disturbance and suicidal ideas. The patient is not nervous/anxious and is not hyperactive.    All other systems reviewed and are negative.      Objective   Blood pressure 118/84, temperature 97.5 °F (36.4 °C), temperature  "source Temporal Artery , height 157.5 cm (62\"), weight 75.8 kg (167 lb).  Body mass index is 30.54 kg/m².    Physical Exam   Constitutional: She is oriented to person, place, and time. She appears well-developed and well-nourished.  Non-toxic appearance. No distress.   HENT:   Head: Normocephalic and atraumatic.   Mouth/Throat: Oropharynx is clear and moist.   Eyes: EOM are normal. Pupils are equal, round, and reactive to light. Right eye exhibits no discharge. Left eye exhibits no discharge.   Neck: Phonation normal. No JVD present. No tracheal deviation present. No thyromegaly present.   Cardiovascular: Normal rate and regular rhythm.    Pulmonary/Chest: Effort normal. No stridor. No respiratory distress. She has no wheezes.   Abdominal: Soft. Normal appearance. She exhibits no distension. There is no tenderness.   Musculoskeletal: She exhibits no edema.   Muscle Group     L          R  Deltoid                5          5  Bicep                  5          5  Tricep                 5          5                       5          5  Hand IO              5          5  Hip Flexor           5          5  Knee Extensor   5          5     ADF                    5          5  APF                    5          5      Neurological: She is alert and oriented to person, place, and time. She has normal reflexes. She displays normal reflexes. No cranial nerve deficit or sensory deficit. She exhibits normal muscle tone. She displays a negative Romberg sign. Coordination and gait normal. GCS eye subscore is 4. GCS verbal subscore is 5. GCS motor subscore is 6.   Reflex Scores:       Tricep reflexes are 2+ on the right side and 2+ on the left side.       Bicep reflexes are 2+ on the right side and 2+ on the left side.       Brachioradialis reflexes are 2+ on the right side and 2+ on the left side.       Patellar reflexes are 2+ on the right side and 2+ on the left side.       Achilles reflexes are 2+ on the right side and 2+ on " the left side.  CN II-XII grossly intact.    No pronator drift. FTN testing performed without dysmetria or bradykinesia.   Skin: Skin is warm and dry. She is not diaphoretic. No erythema.   Psychiatric: She has a normal mood and affect. Her speech is normal and behavior is normal. Judgment and thought content normal.   Nursing note and vitals reviewed.        Assessment/Plan     Independent Review of Radiographic Studies:      Available for my review is a MRA of the brain with and without contrast that was performed today, 6/13/18.  There is filling of the large right PICA aneurysm measuring approximately 18 x 16 mm.  There is susceptibility artifact seen within the aneurysm sac which is consistent with the placement of the coil.    Medical Decision Making:      This is a 78-year-old woman with a large PICA aneurysm that was unsuccessfully coiled several weeks ago.  She will need another diagnostic angiogram under general anesthesia with intent to coil her aneurysm.  We may or may not treat her subclavian steal with a stent at that time, however my priority at this point would be to treat the aneurysm.  Informed consent for a diagnostic angiogram with coil embolization of her PICA aneurysm was once again obtained from the patient.  All questions were answered.  The attendant risks and benefits were acknowledged by the patient and her daughter.  They elect to proceed.  No guarantees were given or implied.  We will schedule her on an elective basis for an angiogram under anesthesia with intent to coil her aneurysm in the near future.    Gloria was seen today for follow-up and cerebral aneurysm.    Diagnoses and all orders for this visit:    Intracranial aneurysm    Subclavian steal syndrome        No Follow-up on file.           This document signed by JUSTIN Cary MD June 13, 2018 2:18 PM

## 2018-06-26 ENCOUNTER — HOSPITAL ENCOUNTER (INPATIENT)
Facility: HOSPITAL | Age: 78
LOS: 1 days | Discharge: HOME | End: 2018-06-26
Attending: NEUROLOGICAL SURGERY | Admitting: NEUROLOGICAL SURGERY

## 2018-06-26 ENCOUNTER — ANESTHESIA EVENT (OUTPATIENT)
Dept: CARDIOLOGY | Facility: HOSPITAL | Age: 78
End: 2018-06-26

## 2018-06-26 ENCOUNTER — ANESTHESIA (OUTPATIENT)
Dept: CARDIOLOGY | Facility: HOSPITAL | Age: 78
End: 2018-06-26

## 2018-06-26 VITALS
TEMPERATURE: 97.4 F | OXYGEN SATURATION: 97 % | WEIGHT: 166.01 LBS | HEART RATE: 67 BPM | BODY MASS INDEX: 30.55 KG/M2 | SYSTOLIC BLOOD PRESSURE: 184 MMHG | DIASTOLIC BLOOD PRESSURE: 90 MMHG | HEIGHT: 62 IN | RESPIRATION RATE: 16 BRPM

## 2018-06-26 DIAGNOSIS — I67.1 INTRACRANIAL ANEURYSM: ICD-10-CM

## 2018-06-26 LAB
ANION GAP SERPL CALCULATED.3IONS-SCNC: 4 MMOL/L (ref 3–11)
BUN BLD-MCNC: 21 MG/DL (ref 9–23)
BUN/CREAT SERPL: 25.9 (ref 7–25)
CALCIUM SPEC-SCNC: 9.2 MG/DL (ref 8.7–10.4)
CHLORIDE SERPL-SCNC: 108 MMOL/L (ref 99–109)
CO2 SERPL-SCNC: 29 MMOL/L (ref 20–31)
CREAT BLD-MCNC: 0.81 MG/DL (ref 0.6–1.3)
DEPRECATED RDW RBC AUTO: 46 FL (ref 37–54)
ERYTHROCYTE [DISTWIDTH] IN BLOOD BY AUTOMATED COUNT: 13.2 % (ref 11.3–14.5)
GFR SERPL CREATININE-BSD FRML MDRD: 68 ML/MIN/1.73
GLUCOSE BLD-MCNC: 100 MG/DL (ref 70–100)
HCT VFR BLD AUTO: 39.9 % (ref 34.5–44)
HGB BLD-MCNC: 12.8 G/DL (ref 11.5–15.5)
MCH RBC QN AUTO: 31.1 PG (ref 27–31)
MCHC RBC AUTO-ENTMCNC: 32.1 G/DL (ref 32–36)
MCV RBC AUTO: 96.8 FL (ref 80–99)
PLATELET # BLD AUTO: 259 10*3/MM3 (ref 150–450)
PMV BLD AUTO: 10.6 FL (ref 6–12)
POTASSIUM BLD-SCNC: 4.5 MMOL/L (ref 3.5–5.5)
RBC # BLD AUTO: 4.12 10*6/MM3 (ref 3.89–5.14)
SODIUM BLD-SCNC: 141 MMOL/L (ref 132–146)
WBC NRBC COR # BLD: 4.37 10*3/MM3 (ref 3.5–10.8)

## 2018-06-26 PROCEDURE — 37236 OPEN/PERQ PLACE STENT 1ST: CPT | Performed by: NEUROLOGICAL SURGERY

## 2018-06-26 PROCEDURE — 36415 COLL VENOUS BLD VENIPUNCTURE: CPT

## 2018-06-26 PROCEDURE — C1876 STENT, NON-COA/NON-COV W/DEL: HCPCS | Performed by: NEUROLOGICAL SURGERY

## 2018-06-26 PROCEDURE — 99024 POSTOP FOLLOW-UP VISIT: CPT | Performed by: NEUROLOGICAL SURGERY

## 2018-06-26 PROCEDURE — C1760 CLOSURE DEV, VASC: HCPCS | Performed by: NEUROLOGICAL SURGERY

## 2018-06-26 PROCEDURE — 25010000002 HEPARIN (PORCINE) PER 1000 UNITS: Performed by: NEUROLOGICAL SURGERY

## 2018-06-26 PROCEDURE — C1894 INTRO/SHEATH, NON-LASER: HCPCS | Performed by: NEUROLOGICAL SURGERY

## 2018-06-26 PROCEDURE — C1725 CATH, TRANSLUMIN NON-LASER: HCPCS | Performed by: NEUROLOGICAL SURGERY

## 2018-06-26 PROCEDURE — 80048 BASIC METABOLIC PNL TOTAL CA: CPT | Performed by: NEUROLOGICAL SURGERY

## 2018-06-26 PROCEDURE — C1769 GUIDE WIRE: HCPCS | Performed by: NEUROLOGICAL SURGERY

## 2018-06-26 PROCEDURE — 25010000002 FENTANYL CITRATE (PF) 100 MCG/2ML SOLUTION: Performed by: NEUROLOGICAL SURGERY

## 2018-06-26 PROCEDURE — B517YZZ FLUOROSCOPY OF LEFT SUBCLAVIAN VEIN USING OTHER CONTRAST: ICD-10-PCS | Performed by: NEUROLOGICAL SURGERY

## 2018-06-26 PROCEDURE — B41DYZZ FLUOROSCOPY OF AORTA AND BILATERAL LOWER EXTREMITY ARTERIES USING OTHER CONTRAST: ICD-10-PCS | Performed by: NEUROLOGICAL SURGERY

## 2018-06-26 PROCEDURE — 85027 COMPLETE CBC AUTOMATED: CPT | Performed by: NEUROLOGICAL SURGERY

## 2018-06-26 PROCEDURE — 03743DZ DILATION OF LEFT SUBCLAVIAN ARTERY WITH INTRALUMINAL DEVICE, PERCUTANEOUS APPROACH: ICD-10-PCS | Performed by: NEUROLOGICAL SURGERY

## 2018-06-26 PROCEDURE — 36226 PLACE CATH VERTEBRAL ART: CPT | Performed by: NEUROLOGICAL SURGERY

## 2018-06-26 PROCEDURE — 25010000002 MIDAZOLAM PER 1 MG: Performed by: NEUROLOGICAL SURGERY

## 2018-06-26 PROCEDURE — 0 IODIXANOL PER 1 ML: Performed by: NEUROLOGICAL SURGERY

## 2018-06-26 DEVICE — PREMOUNTED STENT SYSTEM
Type: IMPLANTABLE DEVICE | Status: FUNCTIONAL
Brand: EXPRESS® LD ILIAC / BILIARY

## 2018-06-26 RX ORDER — SODIUM CHLORIDE 0.9 % (FLUSH) 0.9 %
1-10 SYRINGE (ML) INJECTION AS NEEDED
Status: DISCONTINUED | OUTPATIENT
Start: 2018-06-26 | End: 2018-06-26

## 2018-06-26 RX ORDER — LOSARTAN POTASSIUM 50 MG/1
50 TABLET ORAL DAILY
Status: DISCONTINUED | OUTPATIENT
Start: 2018-06-27 | End: 2018-06-26 | Stop reason: HOSPADM

## 2018-06-26 RX ORDER — ASPIRIN 325 MG
325 TABLET, DELAYED RELEASE (ENTERIC COATED) ORAL DAILY
Status: DISCONTINUED | OUTPATIENT
Start: 2018-06-26 | End: 2018-06-26 | Stop reason: HOSPADM

## 2018-06-26 RX ORDER — CEFAZOLIN SODIUM 2 G/100ML
2 INJECTION, SOLUTION INTRAVENOUS ONCE
Status: DISCONTINUED | OUTPATIENT
Start: 2018-06-26 | End: 2018-06-26

## 2018-06-26 RX ORDER — IODIXANOL 320 MG/ML
INJECTION, SOLUTION INTRAVASCULAR AS NEEDED
Status: DISCONTINUED | OUTPATIENT
Start: 2018-06-26 | End: 2018-06-26 | Stop reason: HOSPADM

## 2018-06-26 RX ORDER — ONDANSETRON 4 MG/1
4 TABLET, FILM COATED ORAL EVERY 6 HOURS PRN
Status: DISCONTINUED | OUTPATIENT
Start: 2018-06-26 | End: 2018-06-26 | Stop reason: HOSPADM

## 2018-06-26 RX ORDER — FAMOTIDINE 20 MG/1
20 TABLET, FILM COATED ORAL ONCE
Status: COMPLETED | OUTPATIENT
Start: 2018-06-26 | End: 2018-06-26

## 2018-06-26 RX ORDER — CLOPIDOGREL BISULFATE 75 MG/1
75 TABLET ORAL DAILY
Qty: 30 TABLET | Refills: 11 | Status: SHIPPED | OUTPATIENT
Start: 2018-06-27 | End: 2018-07-27

## 2018-06-26 RX ORDER — SODIUM CHLORIDE, SODIUM LACTATE, POTASSIUM CHLORIDE, CALCIUM CHLORIDE 600; 310; 30; 20 MG/100ML; MG/100ML; MG/100ML; MG/100ML
9 INJECTION, SOLUTION INTRAVENOUS CONTINUOUS
Status: DISCONTINUED | OUTPATIENT
Start: 2018-06-26 | End: 2018-06-26 | Stop reason: HOSPADM

## 2018-06-26 RX ORDER — HYDROCODONE BITARTRATE AND ACETAMINOPHEN 5; 325 MG/1; MG/1
1 TABLET ORAL EVERY 4 HOURS PRN
Status: DISCONTINUED | OUTPATIENT
Start: 2018-06-26 | End: 2018-06-26 | Stop reason: HOSPADM

## 2018-06-26 RX ORDER — MIDAZOLAM HYDROCHLORIDE 1 MG/ML
INJECTION INTRAMUSCULAR; INTRAVENOUS AS NEEDED
Status: DISCONTINUED | OUTPATIENT
Start: 2018-06-26 | End: 2018-06-26 | Stop reason: HOSPADM

## 2018-06-26 RX ORDER — CLOPIDOGREL BISULFATE 75 MG/1
300 TABLET ORAL ONCE
Status: COMPLETED | OUTPATIENT
Start: 2018-06-26 | End: 2018-06-26

## 2018-06-26 RX ORDER — LEVOTHYROXINE SODIUM 88 UG/1
88 TABLET ORAL
Status: DISCONTINUED | OUTPATIENT
Start: 2018-06-27 | End: 2018-06-26 | Stop reason: HOSPADM

## 2018-06-26 RX ORDER — LIDOCAINE HYDROCHLORIDE 10 MG/ML
0.5 INJECTION, SOLUTION EPIDURAL; INFILTRATION; INTRACAUDAL; PERINEURAL ONCE AS NEEDED
Status: DISCONTINUED | OUTPATIENT
Start: 2018-06-26 | End: 2018-06-26

## 2018-06-26 RX ORDER — SODIUM CHLORIDE 9 MG/ML
50 INJECTION, SOLUTION INTRAVENOUS CONTINUOUS
Status: DISCONTINUED | OUTPATIENT
Start: 2018-06-26 | End: 2018-06-26 | Stop reason: HOSPADM

## 2018-06-26 RX ORDER — FENTANYL CITRATE 50 UG/ML
INJECTION, SOLUTION INTRAMUSCULAR; INTRAVENOUS AS NEEDED
Status: DISCONTINUED | OUTPATIENT
Start: 2018-06-26 | End: 2018-06-26 | Stop reason: HOSPADM

## 2018-06-26 RX ORDER — CLOPIDOGREL BISULFATE 75 MG/1
75 TABLET ORAL DAILY
Status: DISCONTINUED | OUTPATIENT
Start: 2018-06-27 | End: 2018-06-26 | Stop reason: HOSPADM

## 2018-06-26 RX ORDER — LIDOCAINE HYDROCHLORIDE 10 MG/ML
INJECTION, SOLUTION EPIDURAL; INFILTRATION; INTRACAUDAL; PERINEURAL AS NEEDED
Status: DISCONTINUED | OUTPATIENT
Start: 2018-06-26 | End: 2018-06-26 | Stop reason: HOSPADM

## 2018-06-26 RX ORDER — HEPARIN SODIUM 1000 [USP'U]/ML
INJECTION, SOLUTION INTRAVENOUS; SUBCUTANEOUS AS NEEDED
Status: DISCONTINUED | OUTPATIENT
Start: 2018-06-26 | End: 2018-06-26 | Stop reason: HOSPADM

## 2018-06-26 RX ORDER — ONDANSETRON 2 MG/ML
4 INJECTION INTRAMUSCULAR; INTRAVENOUS EVERY 6 HOURS PRN
Status: DISCONTINUED | OUTPATIENT
Start: 2018-06-26 | End: 2018-06-26 | Stop reason: HOSPADM

## 2018-06-26 RX ADMIN — SODIUM CHLORIDE 50 ML/HR: 9 INJECTION, SOLUTION INTRAVENOUS at 07:35

## 2018-06-26 RX ADMIN — FAMOTIDINE 20 MG: 20 TABLET ORAL at 07:43

## 2018-06-26 RX ADMIN — CLOPIDOGREL BISULFATE 300 MG: 75 TABLET ORAL at 08:05

## 2018-07-27 ENCOUNTER — OFFICE VISIT (OUTPATIENT)
Dept: NEUROSURGERY | Facility: CLINIC | Age: 78
End: 2018-07-27

## 2018-07-27 VITALS
SYSTOLIC BLOOD PRESSURE: 138 MMHG | BODY MASS INDEX: 30.55 KG/M2 | HEIGHT: 62 IN | TEMPERATURE: 97.4 F | WEIGHT: 166 LBS | DIASTOLIC BLOOD PRESSURE: 82 MMHG

## 2018-07-27 DIAGNOSIS — Z98.890 S/P COIL EMBOLIZATION OF CEREBRAL ANEURYSM: Primary | ICD-10-CM

## 2018-07-27 DIAGNOSIS — G45.8 SUBCLAVIAN STEAL SYNDROME: ICD-10-CM

## 2018-07-27 DIAGNOSIS — I67.1 INTRACRANIAL ANEURYSM: ICD-10-CM

## 2018-07-27 PROCEDURE — 99213 OFFICE O/P EST LOW 20 MIN: CPT | Performed by: NEUROLOGICAL SURGERY

## 2018-07-27 NOTE — PROGRESS NOTES
Subjective     Chief Complaint: Follow-up intracranial aneurysm coiling, and left subclavian artery stent    Patient ID: Gloria Willams is a 78 y.o. female is here today for follow-up.    Dizziness   This is a chronic problem. The current episode started more than 1 year ago. The problem occurs rarely. The problem has been resolved. Pertinent negatives include no abdominal pain, arthralgias, chest pain, chills, congestion, coughing, diaphoresis, fatigue, fever, headaches, joint swelling, myalgias, nausea, neck pain, numbness, rash, sore throat, vomiting or weakness. The symptoms are aggravated by exertion. Treatments tried: Subclavian artery stent. The treatment provided significant relief.       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  She subsequently underwent a stenting of her left subclavian artery for subclavian steal syndrome.  She presents today for routine follow-up.    She reports significant improvement in terms of her dizziness, lightheadedness, and the ability to use her left arm.  She denies any headaches, numbness, tingling, weakness, headaches, nausea, vomiting, strokelike symptoms, or seizures.    She is still on aspirin and Plavix, and is reporting excessive bruising.    The following portions of the patient's history were reviewed and updated as appropriate: allergies, current medications, past family history, past medical history, past social history, past surgical history and problem list.    Family history:   Family History   Problem Relation Age of Onset   • Diabetes Mother    • Hypertension Mother    • Heart disease Father        Social history:   Social History     Social History   • Marital status:      Spouse name: N/A   • Number of children: N/A   • Years of education: N/A     Occupational History   • Not on file.     Social History Main Topics   • Smoking status: Never Smoker   • Smokeless tobacco: Never Used    • Alcohol use No   • Drug use: No   • Sexual activity: Defer     Other Topics Concern   • Not on file     Social History Narrative   • No narrative on file       Review of Systems   Constitutional: Negative for activity change, appetite change, chills, diaphoresis, fatigue, fever and unexpected weight change.   HENT: Negative for congestion, dental problem, drooling, ear discharge, ear pain, facial swelling, hearing loss, mouth sores, nosebleeds, postnasal drip, rhinorrhea, sinus pressure, sneezing, sore throat, tinnitus, trouble swallowing and voice change.    Eyes: Negative for photophobia, pain, discharge, redness, itching and visual disturbance.   Respiratory: Negative for apnea, cough, choking, chest tightness, shortness of breath, wheezing and stridor.    Cardiovascular: Negative for chest pain, palpitations and leg swelling.   Gastrointestinal: Negative for abdominal distention, abdominal pain, anal bleeding, blood in stool, constipation, diarrhea, nausea, rectal pain and vomiting.   Endocrine: Negative for cold intolerance, heat intolerance, polydipsia, polyphagia and polyuria.   Genitourinary: Negative for decreased urine volume, difficulty urinating, dysuria, enuresis, flank pain, frequency, genital sores, hematuria and urgency.   Musculoskeletal: Negative for arthralgias, back pain, gait problem, joint swelling, myalgias, neck pain and neck stiffness.   Skin: Negative for color change, pallor, rash and wound.   Allergic/Immunologic: Negative for environmental allergies, food allergies and immunocompromised state.   Neurological: Negative for dizziness, tremors, seizures, syncope, facial asymmetry, speech difficulty, weakness, light-headedness, numbness and headaches.   Hematological: Negative for adenopathy. Does not bruise/bleed easily.   Psychiatric/Behavioral: Negative for agitation, behavioral problems, confusion, decreased concentration, dysphoric mood, hallucinations, self-injury, sleep  "disturbance and suicidal ideas. The patient is not nervous/anxious and is not hyperactive.    All other systems reviewed and are negative.      Objective   Blood pressure 138/82, temperature 97.4 °F (36.3 °C), temperature source Temporal Artery , height 157.5 cm (62\"), weight 75.3 kg (166 lb).  Body mass index is 30.36 kg/m².    Physical Exam   Constitutional: She is oriented to person, place, and time. She appears well-developed and well-nourished.  Non-toxic appearance. No distress.   HENT:   Head: Normocephalic and atraumatic.   Mouth/Throat: Oropharynx is clear and moist.   Eyes: Pupils are equal, round, and reactive to light. EOM are normal. Right eye exhibits no discharge. Left eye exhibits no discharge.   Neck: Phonation normal. No JVD present. No tracheal deviation present. No thyromegaly present.   Cardiovascular: Normal rate and regular rhythm.    Pulmonary/Chest: Effort normal. No stridor. No respiratory distress. She has no wheezes.   Abdominal: Soft. Normal appearance. She exhibits no distension. There is no tenderness.   Musculoskeletal: She exhibits no edema.   Muscle Group     L          R  Deltoid                5          5  Bicep                  5          5  Tricep                 5          5                       5          5  Hand IO              5          5  Hip Flexor           5          5  Knee Extensor   5          5     ADF                    5          5  APF                    5          5      Neurological: She is alert and oriented to person, place, and time. She has normal reflexes. She displays normal reflexes. No cranial nerve deficit or sensory deficit. She exhibits normal muscle tone. She displays a negative Romberg sign. Coordination and gait normal. GCS eye subscore is 4. GCS verbal subscore is 5. GCS motor subscore is 6.   Reflex Scores:       Tricep reflexes are 2+ on the right side and 2+ on the left side.       Bicep reflexes are 2+ on the right side and 2+ on the " left side.       Brachioradialis reflexes are 2+ on the right side and 2+ on the left side.       Patellar reflexes are 2+ on the right side and 2+ on the left side.       Achilles reflexes are 2+ on the right side and 2+ on the left side.  CN II-XII grossly intact.    No pronator drift. FTN testing performed without dysmetria or bradykinesia.   Skin: Skin is warm and dry. She is not diaphoretic. No erythema.   Psychiatric: She has a normal mood and affect. Her speech is normal and behavior is normal. Judgment and thought content normal.   Nursing note and vitals reviewed.        Assessment/Plan     Independent Review of Radiographic Studies:      She has no new imaging for me to review    Medical Decision Making:      This is a 78-year-old woman with subclavian steal syndrome that has resolved following stenting of her left subclavian artery origin.  She also has an intracranial aneurysm that has a small amount of residual aneurysm at the base that is not amenable to coil embolization at this time.    I would like to follow up with her with a MRA of the brain with and without contrast in approximately 6 months.  I reviewed the signs and symptoms of stroke and intracranial hemorrhage with her.  I directed her to contact my office with new, or worsening symptoms, otherwise I would like to follow up with her in 6 months after her MRA of the brain.    She can stop her Plavix and go to aspirin monotherapy alone.    Gloria was seen today for post-op.    Diagnoses and all orders for this visit:    S/P coil embolization of cerebral aneurysm  -     MRI Angiogram Head With & Without Contrast; Future    Intracranial aneurysm  -     MRI Angiogram Head With & Without Contrast; Future    Subclavian steal syndrome        No Follow-up on file.           This document signed by JUSTIN Cary MD July 27, 2018 11:05 AM

## 2018-12-17 ENCOUNTER — HOSPITAL ENCOUNTER (OUTPATIENT)
Dept: MRI IMAGING | Facility: HOSPITAL | Age: 78
Discharge: HOME | End: 2018-12-17
Attending: NEUROLOGICAL SURGERY | Admitting: NEUROLOGICAL SURGERY

## 2018-12-17 ENCOUNTER — OFFICE VISIT (OUTPATIENT)
Dept: NEUROSURGERY | Facility: CLINIC | Age: 78
End: 2018-12-17

## 2018-12-17 VITALS
SYSTOLIC BLOOD PRESSURE: 138 MMHG | WEIGHT: 168.4 LBS | HEIGHT: 62 IN | BODY MASS INDEX: 30.99 KG/M2 | DIASTOLIC BLOOD PRESSURE: 82 MMHG | TEMPERATURE: 97.9 F

## 2018-12-17 DIAGNOSIS — I77.1 SUBCLAVIAN ARTERIAL STENOSIS (HCC): Primary | ICD-10-CM

## 2018-12-17 DIAGNOSIS — I67.1 INTRACRANIAL ANEURYSM: ICD-10-CM

## 2018-12-17 DIAGNOSIS — Z98.890 S/P COIL EMBOLIZATION OF CEREBRAL ANEURYSM: ICD-10-CM

## 2018-12-17 PROCEDURE — 0 GADOBENATE DIMEGLUMINE 529 MG/ML SOLUTION: Performed by: NEUROLOGICAL SURGERY

## 2018-12-17 PROCEDURE — 99213 OFFICE O/P EST LOW 20 MIN: CPT | Performed by: NEUROLOGICAL SURGERY

## 2018-12-17 PROCEDURE — A9577 INJ MULTIHANCE: HCPCS | Performed by: NEUROLOGICAL SURGERY

## 2018-12-17 PROCEDURE — 70546 MR ANGIOGRAPH HEAD W/O&W/DYE: CPT

## 2018-12-17 RX ADMIN — GADOBENATE DIMEGLUMINE 13 ML: 529 INJECTION, SOLUTION INTRAVENOUS at 11:23

## 2018-12-17 NOTE — PROGRESS NOTES
Subjective     Chief Complaint: Intracranial aneurysm, follow-up status post subclavian stent    Patient ID: Gloria Willams is a 78 y.o. female is here today for follow-up.    Dizziness   This is a chronic problem. The current episode started more than 1 year ago. The problem occurs rarely. The problem has been resolved. Pertinent negatives include no abdominal pain, arthralgias, chest pain, chills, congestion, coughing, diaphoresis, fatigue, fever, headaches, joint swelling, myalgias, nausea, neck pain, numbness, rash, sore throat, vomiting or weakness. The symptoms are aggravated by exertion. Treatments tried: Subclavian artery stent. The treatment provided significant relief.       This is a 78-year-old woman who underwent an embolization of a PICA aneurysm in April 2018.  She also had subclavian stenosis which was treated with a stent in June 2018.  She presents today for routine follow-up and to discuss the results of her surveillance MRA of the brain.    She reports no new problems with headaches, nausea, vomiting, strokelike symptoms, or seizures.  She states that her dizziness and fatigue have significantly improved since having her subclavian stent placed.    The following portions of the patient's history were reviewed and updated as appropriate: allergies, current medications, past family history, past medical history, past social history, past surgical history and problem list.    Family history:   Family History   Problem Relation Age of Onset   • Diabetes Mother    • Hypertension Mother    • Heart disease Father        Social history:   Social History     Socioeconomic History   • Marital status:      Spouse name: Not on file   • Number of children: Not on file   • Years of education: Not on file   • Highest education level: Not on file   Social Needs   • Financial resource strain: Not on file   • Food insecurity - worry: Not on file   • Food insecurity - inability: Not on file   • Transportation  needs - medical: Not on file   • Transportation needs - non-medical: Not on file   Occupational History   • Not on file   Tobacco Use   • Smoking status: Never Smoker   • Smokeless tobacco: Never Used   Substance and Sexual Activity   • Alcohol use: No   • Drug use: No   • Sexual activity: Defer   Other Topics Concern   • Not on file   Social History Narrative   • Not on file       Review of Systems   Constitutional: Negative for activity change, appetite change, chills, diaphoresis, fatigue, fever and unexpected weight change.   HENT: Negative for congestion, dental problem, drooling, ear discharge, ear pain, facial swelling, hearing loss, mouth sores, nosebleeds, postnasal drip, rhinorrhea, sinus pressure, sneezing, sore throat, tinnitus, trouble swallowing and voice change.    Eyes: Negative for photophobia, pain, discharge, redness, itching and visual disturbance.   Respiratory: Negative for apnea, cough, choking, chest tightness, shortness of breath, wheezing and stridor.    Cardiovascular: Negative for chest pain, palpitations and leg swelling.   Gastrointestinal: Negative for abdominal distention, abdominal pain, anal bleeding, blood in stool, constipation, diarrhea, nausea, rectal pain and vomiting.   Endocrine: Negative for cold intolerance, heat intolerance, polydipsia, polyphagia and polyuria.   Genitourinary: Negative for decreased urine volume, difficulty urinating, dysuria, enuresis, flank pain, frequency, genital sores, hematuria and urgency.   Musculoskeletal: Negative for arthralgias, back pain, gait problem, joint swelling, myalgias, neck pain and neck stiffness.   Skin: Negative for color change, pallor, rash and wound.   Allergic/Immunologic: Negative for environmental allergies, food allergies and immunocompromised state.   Neurological: Negative for dizziness, tremors, seizures, syncope, facial asymmetry, speech difficulty, weakness, light-headedness, numbness and headaches.   Hematological:  "Negative for adenopathy. Does not bruise/bleed easily.   Psychiatric/Behavioral: Negative for agitation, behavioral problems, confusion, decreased concentration, dysphoric mood, hallucinations, self-injury, sleep disturbance and suicidal ideas. The patient is not nervous/anxious and is not hyperactive.    All other systems reviewed and are negative.      Objective   Blood pressure 138/82, temperature 97.9 °F (36.6 °C), temperature source Temporal, height 157.5 cm (62.01\"), weight 76.4 kg (168 lb 6.4 oz).  Body mass index is 30.79 kg/m².    Physical Exam   Constitutional: She is oriented to person, place, and time. She appears well-developed and well-nourished.  Non-toxic appearance. No distress.   HENT:   Head: Normocephalic and atraumatic.   Mouth/Throat: Oropharynx is clear and moist.   Eyes: EOM are normal. Pupils are equal, round, and reactive to light. Right eye exhibits no discharge. Left eye exhibits no discharge.   Neck: Phonation normal. No JVD present. No tracheal deviation present. No thyromegaly present.   Cardiovascular: Normal rate and regular rhythm.   Pulmonary/Chest: Effort normal. No stridor. No respiratory distress. She has no wheezes.   Abdominal: Soft. Normal appearance. She exhibits no distension. There is no tenderness.   Musculoskeletal: She exhibits no edema.   Muscle Group     L          R  Deltoid                5          5  Bicep                  5          5  Tricep                 5          5                       5          5  Hand IO              5          5  Hip Flexor           5          5  Knee Extensor   5          5     ADF                    5          5  APF                    5          5      Neurological: She is alert and oriented to person, place, and time. She has normal reflexes. She displays normal reflexes. No cranial nerve deficit or sensory deficit. She exhibits normal muscle tone. She displays a negative Romberg sign. Coordination and gait normal. GCS eye " subscore is 4. GCS verbal subscore is 5. GCS motor subscore is 6.   Reflex Scores:       Tricep reflexes are 2+ on the right side and 2+ on the left side.       Bicep reflexes are 2+ on the right side and 2+ on the left side.       Brachioradialis reflexes are 2+ on the right side and 2+ on the left side.       Patellar reflexes are 2+ on the right side and 2+ on the left side.       Achilles reflexes are 2+ on the right side and 2+ on the left side.  CN II-XII grossly intact.    No pronator drift. FTN testing performed without dysmetria or bradykinesia.   Skin: Skin is warm and dry. She is not diaphoretic. No erythema.   Psychiatric: She has a normal mood and affect. Her speech is normal and behavior is normal. Judgment and thought content normal.   Nursing note and vitals reviewed.        Assessment/Plan     Independent Review of Radiographic Studies:      Available for my review is a MRA of the brain which was performed today with and without contrast.  In comparison to her prior MRA from March 2018 in June 2018.  There is no significant change or progression in terms of the PICA aneurysm.    Medical Decision Making:      This is a 78-year-old woman with a thrombosed PICA aneurysm that was treated with a coil embolization, and satisfactorily treated subclavian stenosis.  I would like to follow up with her in 1 year with a repeat MRA of the head neck with and without contrast.  I reviewed the signs and symptoms of intracranial hemorrhage and subarachnoid hemorrhage.  I directed the patient to call my office with new or worsening symptoms, otherwise I will follow up with her in 1 year.    Gloria was seen today for cerebral aneurysm.    Diagnoses and all orders for this visit:    Subclavian arterial stenosis (CMS/HCC)    Intracranial aneurysm        No Follow-up on file.           This document signed by JUSTIN Cary MD December 17, 2018 1:59 PM

## 2019-12-27 ENCOUNTER — TELEPHONE (OUTPATIENT)
Dept: NEUROSURGERY | Facility: CLINIC | Age: 79
End: 2019-12-27

## 2019-12-27 DIAGNOSIS — I67.1 INTRACRANIAL ANEURYSM: Primary | ICD-10-CM

## 2020-01-15 ENCOUNTER — HOSPITAL ENCOUNTER (OUTPATIENT)
Dept: MRI IMAGING | Facility: HOSPITAL | Age: 80
Discharge: HOME | End: 2020-01-15

## 2020-01-15 ENCOUNTER — OFFICE VISIT (OUTPATIENT)
Dept: NEUROSURGERY | Facility: CLINIC | Age: 80
End: 2020-01-15

## 2020-01-15 ENCOUNTER — HOSPITAL ENCOUNTER (OUTPATIENT)
Dept: MRI IMAGING | Facility: HOSPITAL | Age: 80
Discharge: HOME | End: 2020-01-15
Admitting: PHYSICIAN ASSISTANT

## 2020-01-15 VITALS — HEIGHT: 62 IN | WEIGHT: 172 LBS | BODY MASS INDEX: 31.65 KG/M2

## 2020-01-15 DIAGNOSIS — I67.1 INTRACRANIAL ANEURYSM: Primary | ICD-10-CM

## 2020-01-15 DIAGNOSIS — I67.1 INTRACRANIAL ANEURYSM: ICD-10-CM

## 2020-01-15 PROCEDURE — 0 GADOBENATE DIMEGLUMINE 529 MG/ML SOLUTION: Performed by: PHYSICIAN ASSISTANT

## 2020-01-15 PROCEDURE — 82565 ASSAY OF CREATININE: CPT

## 2020-01-15 PROCEDURE — 99213 OFFICE O/P EST LOW 20 MIN: CPT | Performed by: NEUROLOGICAL SURGERY

## 2020-01-15 PROCEDURE — 70548 MR ANGIOGRAPHY NECK W/DYE: CPT

## 2020-01-15 PROCEDURE — A9577 INJ MULTIHANCE: HCPCS | Performed by: PHYSICIAN ASSISTANT

## 2020-01-15 PROCEDURE — 70545 MR ANGIOGRAPHY HEAD W/DYE: CPT

## 2020-01-15 RX ORDER — LEVOTHYROXINE SODIUM 0.07 MG/1
TABLET ORAL
COMMUNITY
Start: 2019-12-03

## 2020-01-15 RX ADMIN — GADOBENATE DIMEGLUMINE 15 ML: 529 INJECTION, SOLUTION INTRAVENOUS at 11:58

## 2020-01-15 NOTE — PROGRESS NOTES
Subjective     Chief Complaint: Follow-up PICA aneurysm embolization    Patient ID: Gloria Willams is a 79 y.o. female is here today for follow-up.    History of Present Illness    This is an 79-year-old woman who underwent embolization of a large PICA aneurysm in April 2018.  She presents today to discuss the results of her surveillance MRA.    The following portions of the patient's history were reviewed and updated as appropriate: allergies, current medications, past family history, past medical history, past social history, past surgical history and problem list.    Family history:   Family History   Problem Relation Age of Onset   • Diabetes Mother    • Hypertension Mother    • Heart disease Father        Social history:   Social History     Socioeconomic History   • Marital status:      Spouse name: Not on file   • Number of children: Not on file   • Years of education: Not on file   • Highest education level: Not on file   Tobacco Use   • Smoking status: Never Smoker   • Smokeless tobacco: Never Used   Substance and Sexual Activity   • Alcohol use: No   • Drug use: No   • Sexual activity: Defer       Review of Systems   Constitutional: Negative for activity change, appetite change, chills, diaphoresis, fatigue, fever and unexpected weight change.   HENT: Negative for congestion, dental problem, drooling, ear discharge, ear pain, facial swelling, hearing loss, mouth sores, nosebleeds, postnasal drip, rhinorrhea, sinus pressure, sinus pain, sneezing, sore throat, tinnitus, trouble swallowing and voice change.    Eyes: Negative for photophobia, pain, discharge, redness, itching and visual disturbance.   Respiratory: Negative for apnea, cough, choking, chest tightness, shortness of breath, wheezing and stridor.    Cardiovascular: Negative for chest pain, palpitations and leg swelling.   Gastrointestinal: Negative for abdominal distention, abdominal pain, anal bleeding, blood in stool, constipation, diarrhea,  "nausea, rectal pain and vomiting.   Endocrine: Negative for cold intolerance, heat intolerance, polydipsia, polyphagia and polyuria.   Genitourinary: Negative for decreased urine volume, difficulty urinating, dyspareunia, dysuria, enuresis, flank pain, frequency, genital sores, hematuria, menstrual problem, pelvic pain, urgency, vaginal bleeding, vaginal discharge and vaginal pain.   Musculoskeletal: Negative for arthralgias, back pain, gait problem, joint swelling, myalgias, neck pain and neck stiffness.   Skin: Negative for color change, pallor, rash and wound.   Allergic/Immunologic: Negative for environmental allergies, food allergies and immunocompromised state.   Neurological: Negative for dizziness, tremors, seizures, syncope, facial asymmetry, speech difficulty, weakness, light-headedness, numbness and headaches.   Hematological: Negative for adenopathy. Does not bruise/bleed easily.   Psychiatric/Behavioral: Positive for dysphoric mood. Negative for agitation, behavioral problems, confusion, decreased concentration, hallucinations, self-injury, sleep disturbance and suicidal ideas. The patient is not nervous/anxious and is not hyperactive.        Objective   Height 157.5 cm (62.01\"), weight 78 kg (172 lb), not currently breastfeeding.  Body mass index is 31.45 kg/m².    Physical Exam   Constitutional: She is oriented to person, place, and time. She appears well-developed and well-nourished. No distress.   HENT:   Head: Normocephalic and atraumatic.   Pulmonary/Chest: Effort normal.   Neurological: She is alert and oriented to person, place, and time. No cranial nerve deficit.   Skin: Skin is warm and dry. She is not diaphoretic.   Psychiatric: She has a normal mood and affect.         Independent review of medical imaging    Available for my review is a MRA of the brain which was performed today with and without contrast.  In comparison to her prior study from 12/17/2018, there has been no significant " change in either the size of the aneurysm or the small amount of opacification at the neck of the coil mass in comparison to her post procedure angiogram, or her last MRA.      Assessment/Plan       This is an 79-year-old woman with a stable small neck remnant of a large PICA aneurysm.  She is a year and a half out from her coiling.  At this point, she can have a surveillance MRA of the brain with and without contrast in 2 years.  I will follow-up with her at that time, or sooner if clinically indicated.    Diagnoses and all orders for this visit:    Intracranial aneurysm  -     MRI Angiogram Head With & Without Contrast; Future        No follow-ups on file.             Jonas Cary MD

## 2020-01-16 LAB — CREAT BLDA-MCNC: 1 MG/DL (ref 0.6–1.3)

## 2020-11-24 ENCOUNTER — TELEPHONE (OUTPATIENT)
Dept: NEUROSURGERY | Facility: CLINIC | Age: 80
End: 2020-11-24

## 2020-11-24 DIAGNOSIS — I62.00 SUBDURAL HEMORRHAGE (HCC): Primary | ICD-10-CM

## 2020-11-24 NOTE — TELEPHONE ENCOUNTER
I spoke with Dr. Garcia further. MRI results faxed over which states:  Large left frontoparietal extra-axial fluid collection measuring approximately 11 cm anterior to posterior X 3 cm in thickness presumably representing a large hematoma. Minimal mass-effect on the left frontal parietal lobes, no cerebral edema.  He stated that he has been following patient for the last 2 weeks. She had fallen a couple of times prior to that. Presently she is neurologically intact with only a mild, intermittent headache.  I attempted to call patient to talk further and discuss ED criteria. Left voicemail for her to return our call.    Please arrange f/u in clinic Monday 11/30 with [facility] or I with MRI brain. I am also going to order a CT head so we can follow this subsequently.   Plan to discuss with Royal/Dr. Coley (on call)

## 2020-11-24 NOTE — TELEPHONE ENCOUNTER
Dr. Jamshid Garcia (PCP) called in regard to pt. Pt had a fall about 4 weeks ago, Dr. Garcia ordered and MRI and that showed a left parietal SDH. Pt has no neurological deficits, only headache. Message was given to Terrie Mckinney to determine next steps.

## 2020-11-24 NOTE — TELEPHONE ENCOUNTER
Patient returned my call.  Discussed signs and symptoms of worsening SDH with her.  She has family close by that will closely monitor her.  Presently, she reports no headache, weakness, nausea or vomiting, visual changes.  She was instructed to present to the ED/call 911 if she develops any of these symptoms.

## 2020-11-30 ENCOUNTER — OFFICE VISIT (OUTPATIENT)
Dept: NEUROSURGERY | Facility: CLINIC | Age: 80
End: 2020-11-30

## 2020-11-30 ENCOUNTER — HOSPITAL ENCOUNTER (OUTPATIENT)
Dept: CT IMAGING | Facility: HOSPITAL | Age: 80
Discharge: HOME | End: 2020-11-30
Admitting: PHYSICIAN ASSISTANT

## 2020-11-30 VITALS
SYSTOLIC BLOOD PRESSURE: 146 MMHG | DIASTOLIC BLOOD PRESSURE: 82 MMHG | BODY MASS INDEX: 31.47 KG/M2 | WEIGHT: 171 LBS | HEIGHT: 62 IN

## 2020-11-30 DIAGNOSIS — I62.00 SUBDURAL HEMORRHAGE (HCC): Primary | ICD-10-CM

## 2020-11-30 DIAGNOSIS — I62.00 SUBDURAL HEMORRHAGE (HCC): ICD-10-CM

## 2020-11-30 PROCEDURE — 99214 OFFICE O/P EST MOD 30 MIN: CPT | Performed by: PHYSICIAN ASSISTANT

## 2020-11-30 PROCEDURE — 70450 CT HEAD/BRAIN W/O DYE: CPT

## 2020-11-30 RX ORDER — ESCITALOPRAM OXALATE 20 MG/1
20 TABLET ORAL DAILY
COMMUNITY

## 2020-12-10 ENCOUNTER — APPOINTMENT (OUTPATIENT)
Dept: CT IMAGING | Facility: HOSPITAL | Age: 80
End: 2020-12-10

## 2020-12-10 ENCOUNTER — APPOINTMENT (OUTPATIENT)
Dept: GENERAL RADIOLOGY | Facility: HOSPITAL | Age: 80
End: 2020-12-10

## 2020-12-10 ENCOUNTER — APPOINTMENT (OUTPATIENT)
Dept: MRI IMAGING | Facility: HOSPITAL | Age: 80
End: 2020-12-10

## 2020-12-10 ENCOUNTER — HOSPITAL ENCOUNTER (INPATIENT)
Facility: HOSPITAL | Age: 80
LOS: 5 days | Discharge: HOME HEALTH | End: 2020-12-15
Attending: EMERGENCY MEDICINE | Admitting: INTERNAL MEDICINE

## 2020-12-10 ENCOUNTER — TELEPHONE (OUTPATIENT)
Dept: NEUROSURGERY | Facility: CLINIC | Age: 80
End: 2020-12-10

## 2020-12-10 DIAGNOSIS — R27.0 ATAXIA: ICD-10-CM

## 2020-12-10 DIAGNOSIS — S06.5XAA SUBDURAL HEMATOMA (HCC): Primary | ICD-10-CM

## 2020-12-10 LAB
ALBUMIN SERPL-MCNC: 3.8 G/DL (ref 3.5–5.2)
ALBUMIN/GLOB SERPL: 1.4 G/DL
ALP SERPL-CCNC: 101 U/L (ref 39–117)
ALT SERPL W P-5'-P-CCNC: 10 U/L (ref 1–33)
ANION GAP SERPL CALCULATED.3IONS-SCNC: 10 MMOL/L (ref 5–15)
AST SERPL-CCNC: 19 U/L (ref 1–32)
BASOPHILS # BLD AUTO: 0.02 10*3/MM3 (ref 0–0.2)
BASOPHILS NFR BLD AUTO: 0.5 % (ref 0–1.5)
BILIRUB SERPL-MCNC: 0.7 MG/DL (ref 0–1.2)
BUN SERPL-MCNC: 19 MG/DL (ref 8–23)
BUN/CREAT SERPL: 23.2 (ref 7–25)
CALCIUM SPEC-SCNC: 9.4 MG/DL (ref 8.6–10.5)
CHLORIDE SERPL-SCNC: 102 MMOL/L (ref 98–107)
CO2 SERPL-SCNC: 24 MMOL/L (ref 22–29)
CREAT SERPL-MCNC: 0.82 MG/DL (ref 0.57–1)
DEPRECATED RDW RBC AUTO: 45.7 FL (ref 37–54)
EOSINOPHIL # BLD AUTO: 0.03 10*3/MM3 (ref 0–0.4)
EOSINOPHIL NFR BLD AUTO: 0.7 % (ref 0.3–6.2)
ERYTHROCYTE [DISTWIDTH] IN BLOOD BY AUTOMATED COUNT: 12.8 % (ref 12.3–15.4)
GFR SERPL CREATININE-BSD FRML MDRD: 67 ML/MIN/1.73
GLOBULIN UR ELPH-MCNC: 2.7 GM/DL
GLUCOSE SERPL-MCNC: 92 MG/DL (ref 65–99)
HCT VFR BLD AUTO: 38 % (ref 34–46.6)
HGB BLD-MCNC: 11.7 G/DL (ref 12–15.9)
IMM GRANULOCYTES # BLD AUTO: 0.01 10*3/MM3 (ref 0–0.05)
IMM GRANULOCYTES NFR BLD AUTO: 0.2 % (ref 0–0.5)
INR PPP: 1.09 (ref 0.85–1.16)
LYMPHOCYTES # BLD AUTO: 0.92 10*3/MM3 (ref 0.7–3.1)
LYMPHOCYTES NFR BLD AUTO: 21.9 % (ref 19.6–45.3)
MCH RBC QN AUTO: 29.8 PG (ref 26.6–33)
MCHC RBC AUTO-ENTMCNC: 30.8 G/DL (ref 31.5–35.7)
MCV RBC AUTO: 96.7 FL (ref 79–97)
MONOCYTES # BLD AUTO: 0.62 10*3/MM3 (ref 0.1–0.9)
MONOCYTES NFR BLD AUTO: 14.8 % (ref 5–12)
NEUTROPHILS NFR BLD AUTO: 2.6 10*3/MM3 (ref 1.7–7)
NEUTROPHILS NFR BLD AUTO: 61.9 % (ref 42.7–76)
NRBC BLD AUTO-RTO: 0 /100 WBC (ref 0–0.2)
PLATELET # BLD AUTO: 294 10*3/MM3 (ref 140–450)
PMV BLD AUTO: 10.3 FL (ref 6–12)
POTASSIUM SERPL-SCNC: 4.4 MMOL/L (ref 3.5–5.2)
PROT SERPL-MCNC: 6.5 G/DL (ref 6–8.5)
PROTHROMBIN TIME: 13.8 SECONDS (ref 11.5–14)
RBC # BLD AUTO: 3.93 10*6/MM3 (ref 3.77–5.28)
SARS-COV-2 RDRP RESP QL NAA+PROBE: NORMAL
SODIUM SERPL-SCNC: 136 MMOL/L (ref 136–145)
WBC # BLD AUTO: 4.2 10*3/MM3 (ref 3.4–10.8)

## 2020-12-10 PROCEDURE — 70450 CT HEAD/BRAIN W/O DYE: CPT

## 2020-12-10 PROCEDURE — 93005 ELECTROCARDIOGRAM TRACING: CPT | Performed by: PHYSICIAN ASSISTANT

## 2020-12-10 PROCEDURE — 61312 CRNEC/CRNOT STTL XDRL/SDRL: CPT | Performed by: NEUROLOGICAL SURGERY

## 2020-12-10 PROCEDURE — 85025 COMPLETE CBC W/AUTO DIFF WBC: CPT | Performed by: PHYSICIAN ASSISTANT

## 2020-12-10 PROCEDURE — 61312 CRNEC/CRNOT STTL XDRL/SDRL: CPT | Performed by: PHYSICIAN ASSISTANT

## 2020-12-10 PROCEDURE — 87635 SARS-COV-2 COVID-19 AMP PRB: CPT | Performed by: PHYSICIAN ASSISTANT

## 2020-12-10 PROCEDURE — 71045 X-RAY EXAM CHEST 1 VIEW: CPT

## 2020-12-10 PROCEDURE — 0 GADOBENATE DIMEGLUMINE 529 MG/ML SOLUTION: Performed by: EMERGENCY MEDICINE

## 2020-12-10 PROCEDURE — 80053 COMPREHEN METABOLIC PANEL: CPT | Performed by: PHYSICIAN ASSISTANT

## 2020-12-10 PROCEDURE — A9577 INJ MULTIHANCE: HCPCS | Performed by: EMERGENCY MEDICINE

## 2020-12-10 PROCEDURE — 99223 1ST HOSP IP/OBS HIGH 75: CPT | Performed by: INTERNAL MEDICINE

## 2020-12-10 PROCEDURE — 70553 MRI BRAIN STEM W/O & W/DYE: CPT

## 2020-12-10 PROCEDURE — 99284 EMERGENCY DEPT VISIT MOD MDM: CPT

## 2020-12-10 PROCEDURE — 85610 PROTHROMBIN TIME: CPT | Performed by: PHYSICIAN ASSISTANT

## 2020-12-10 RX ORDER — LEVOTHYROXINE SODIUM 0.07 MG/1
75 TABLET ORAL
Status: DISCONTINUED | OUTPATIENT
Start: 2020-12-11 | End: 2020-12-15 | Stop reason: HOSPADM

## 2020-12-10 RX ORDER — ONDANSETRON 2 MG/ML
4 INJECTION INTRAMUSCULAR; INTRAVENOUS EVERY 6 HOURS PRN
Status: DISCONTINUED | OUTPATIENT
Start: 2020-12-10 | End: 2020-12-11

## 2020-12-10 RX ORDER — LOSARTAN POTASSIUM 50 MG/1
50 TABLET ORAL ONCE
Status: COMPLETED | OUTPATIENT
Start: 2020-12-10 | End: 2020-12-10

## 2020-12-10 RX ORDER — SODIUM CHLORIDE 0.9 % (FLUSH) 0.9 %
10 SYRINGE (ML) INJECTION AS NEEDED
Status: DISCONTINUED | OUTPATIENT
Start: 2020-12-10 | End: 2020-12-15 | Stop reason: HOSPADM

## 2020-12-10 RX ORDER — SODIUM CHLORIDE 0.9 % (FLUSH) 0.9 %
10 SYRINGE (ML) INJECTION EVERY 12 HOURS SCHEDULED
Status: DISCONTINUED | OUTPATIENT
Start: 2020-12-10 | End: 2020-12-15 | Stop reason: HOSPADM

## 2020-12-10 RX ORDER — LOSARTAN POTASSIUM 50 MG/1
50 TABLET ORAL DAILY
Status: DISCONTINUED | OUTPATIENT
Start: 2020-12-11 | End: 2020-12-14

## 2020-12-10 RX ADMIN — SODIUM CHLORIDE, PRESERVATIVE FREE 10 ML: 5 INJECTION INTRAVENOUS at 21:32

## 2020-12-10 RX ADMIN — GADOBENATE DIMEGLUMINE 15 ML: 529 INJECTION, SOLUTION INTRAVENOUS at 19:34

## 2020-12-10 RX ADMIN — LOSARTAN POTASSIUM 50 MG: 50 TABLET, FILM COATED ORAL at 21:31

## 2020-12-10 NOTE — TELEPHONE ENCOUNTER
Provider:  Luis Daniel  Caller: Amarilys  Time of call:   1:21  Phone #:  [phone]  Surgery:coil/embolization/intracranial aneurysm    Surgery Date:  07/27/18  Last visit:   11/30/20  Next visit: 12/21/20    KENZIE:         Reason for call:     Patient daughter called and said her mom has had some significant changes in the past 2 days.    She is having trouble finding her words/stuttering.  She also complains of her right hand being numb.  This just started today.    Patient's daughter is requesting to speak with someone.

## 2020-12-10 NOTE — H&P
[facility] Medicine Services  HISTORY AND PHYSICAL    Patient Name: Gloria Willams  : 1940  MRN: [id number]  Primary Care Physician: Sean Garcia MD  Date of admission: 12/10/2020      Subjective   Subjective     Chief Complaint: ataxia, slurred speech    HPI:  Gloria Willams is a 80 y.o. female with past medical history of prior PICA aneurysm s/p embolization , hypothyroidism on Synthroid, well-controlled hypertension, chronic subdural hematoma sec to previous fall who presents to the ED with 2 days of progressively worsening slurred speech, clumsiness, ataxia and word finding difficulties.  On arrival to the ED she is hemodynamically stable, work-up has included CBC and CMP that were essentially normal, INR 1.09.  CT scan of the head that revealed an expanding subdural hematoma.  Neurosurgery has been consulted, plan for craniotomy in the a.m., hospital medicine was asked to admit.  At the time of evaluation, BP was in the 190s systolic.  She mentioned having intermittent headaches, speech had slightly improved.  Daughter was at bedside.    Current COVID Risks are:  [] Fever []  Cough [] Shortness of breath [] Fatigue [] Change in taste or smell    [] Exposure to COVID positive patient  [] High risk facility   []  NONE    Review of Systems   Gen- No fevers, chills  CV- No chest pain, palpitations  Resp- No cough, dyspnea  GI- No N/V/D, abd pain    All other systems reviewed and are negative.     Personal History     Past Medical History:   Diagnosis Date   • Arthritis    • Hypertension    • Hypothyroid        Past Surgical History:   Procedure Laterality Date   • CHOLECYSTECTOMY     • HYSTERECTOMY     • LA PERM OCCLUSION/EMBOLIZATION,PERCUT,CNS N/A 2018    Procedure: IR intracranial embolization;  Surgeon: Jonas Cary MD;  Location: [facility] INVASIVE LOCATION;  Service: Interventional Radiology   • LA PERM OCCLUSION/EMBOLIZATION,PERCUT,CNS N/A 2018     Procedure: IR intracranial embolization;  Surgeon: Jonas Cary MD;  Location:  [facility] CATH INVASIVE LOCATION;  Service: Interventional Radiology   • [facility] CATH INTRNL CAROTID ART ANGIO INTRCRNL ART Bilateral 3/27/2018    Procedure: IR internal carotid with angiography;  Surgeon: Jonas Cary MD;  Location:  [facility] CATH INVASIVE LOCATION;  Service: Interventional Radiology   • [facility] CATH INTRNL CAROTID ART ANGIO INTRCRNL ART Bilateral 4/17/2018    Procedure: IR internal carotid with angiography;  Surgeon: Jonas Cary MD;  Location:  [facility] CATH INVASIVE LOCATION;  Service: Interventional Radiology   • [facility] CATH INTRNL CAROTID ART ANGIO INTRCRNL ART Bilateral 6/26/2018    Procedure: IR internal carotid with angiography;  Surgeon: Jonas Cary MD;  Location:  [facility] CATH INVASIVE LOCATION;  Service: Interventional Radiology   • [facility] CATH VERTEBRAL ART ANGIO VERTEBRAL ARTERY Bilateral 3/27/2018    Procedure: IR vertebral artery with angiography;  Surgeon: Jonas Cary MD;  Location:  [facility] CATH INVASIVE LOCATION;  Service: Interventional Radiology   • [facility] CATH VERTEBRAL ART ANGIO VERTEBRAL ARTERY Bilateral 4/17/2018    Procedure: IR vertebral artery with angiography;  Surgeon: Jonas Cary MD;  Location:  [facility] CATH INVASIVE LOCATION;  Service: Interventional Radiology   • [facility] CATH VERTEBRAL ART ANGIO VERTEBRAL ARTERY Bilateral 6/26/2018    Procedure: IR vertebral artery with angiography;  Surgeon: Jonas Cary MD;  Location:  [facility] CATH INVASIVE LOCATION;  Service: Interventional Radiology       Family History: family history includes Diabetes in her mother; Heart disease in her father; Hypertension in her mother. Otherwise pertinent FHx was reviewed and unremarkable.     Social History:  reports that she has never smoked. She has never used smokeless tobacco. She reports that she does not drink alcohol or use  drugs.  Social History     Social History Narrative   • Not on file       Medications:  Available home medication information reviewed.  (Not in a hospital admission)      No Known Allergies    Objective   Objective     Vital Signs:   Temp:  [98 °F (36.7 °C)] 98 °F (36.7 °C)  Heart Rate:  [75] 75  Resp:  [18] 18  BP: (157)/(82) 157/82       Physical Exam   Constitutional: Chronically ill-appearing elderly lady, awake, alert  Eyes: PERRLA, sclerae anicteric, no conjunctival injection  HENT: NCAT, mucous membranes moist  Neck: Supple, no thyromegaly, no lymphadenopathy, trachea midline  Respiratory: Clear to auscultation bilaterally, nonlabored respirations   Cardiovascular: RRR, no murmurs, rubs, or gallops, palpable pedal pulses bilaterally  Gastrointestinal: Positive bowel sounds, soft, nontender, nondistended  Musculoskeletal: No bilateral ankle edema, no clubbing or cyanosis to extremities  Psychiatric: Appropriate affect, cooperative  Neurologic: Oriented x 3, strength symmetric in all extremities, Cranial Nerves grossly intact to confrontation, speech is slow but clear  Skin: No rashes    Results Reviewed:  I have personally reviewed most recent indicated data and agree with findings including:  [x]  Laboratory  [x]  Radiology  []  EKG/Telemetry  []  Pathology  []  Cardiac/Vascular Studies  []  Old records  []  Other:  Most pertinent findings include:       LAB RESULTS:  Results from last 7 days   Lab 12/10/20  1759   WBC 4.20   HEMOGLOBIN 11.7*   HEMATOCRIT 38.0   PLATELETS 294   NEUTROS ABS 2.60   IMMATURE GRANS (ABS) 0.01   LYMPHS ABS 0.92   MONOS ABS 0.62   EOS ABS 0.03   MCV 96.7   PROTIME 13.8     Results from last 7 days   Lab 12/10/20  1759   SODIUM 136   POTASSIUM 4.4   CHLORIDE 102   CO2 24.0   ANION GAP 10.0   BUN 19   CREATININE 0.82   GLUCOSE 92   CALCIUM 9.4     Results from last 7 days   Lab 12/10/20  1759   TOTAL PROTEIN 6.5   ALBUMIN 3.80   GLOBULIN 2.7   ALT (SGPT) 10   AST (SGOT) 19      BILIRUBIN 0.7   ALK PHOS 101     Results from last 7 days   Lab 12/10/20  1759   PROTIME 13.8   INR 1.09                 Brief Urine Lab Results     None        Microbiology Results (last 10 days)     Procedure Component Value - Date/Time    COVID PRE-OP / PRE-PROCEDURE SCREENING ORDER (NO ISOLATION) - Swab, Nasopharynx [[id number]]  (Normal) Collected: 12/10/20 1759    Lab Status: Final result Specimen: Swab from Nasopharynx Updated: 12/10/20 1841    Narrative:      The following orders were created for panel order COVID PRE-OP / PRE-PROCEDURE SCREENING ORDER (NO ISOLATION) - Swab, Nasopharynx.  Procedure                               Abnormality         Status                     ---------                               -----------         ------                     COVID-19, [facility] IN-HOUS...[[id number]]  Normal              Final result                 Please view results for these tests on the individual orders.    COVID-19, [facility] IN-HOUSE,NP Swab (NO TRANSPORT MEDIA) 2 HR TAT - Swab, Nasopharynx [[id number]]  (Normal) Collected: 12/10/20 1759    Lab Status: Final result Specimen: Swab from Nasopharynx Updated: 12/10/20 1841     COVID19 Presumptive Negative    Narrative:      Fact sheet for providers: https://www.fda.gov/media/860172/download     Fact sheet for patients: https://www.fda.gov/media/699187/download    Test performed by PCR.  If inconsistent with clinical signs and symptoms patient should be tested with different authorized molecular test.        Imaging Results (Last 24 Hours)     Procedure Component Value Units Date/Time    MRI Brain With & Without Contrast [[id number]] Resulted: 12/10/20 1838     Updated: 12/10/20 1838    XR Chest 1 View [[id number]] Collected: 12/10/20 1756     Updated: 12/10/20 1759    Narrative:      EXAMINATION: XR CHEST 1 VW-      INDICATION: pre op; S06.4H2Z-[id number] subdural hemorrhage with loss of  consciousness of unspecified duration, initial encounter;  R27.0-Ataxia,  unspecified      COMPARISON: 7/31/2016     FINDINGS: No focal airspace consolidation. No significant pleural  effusion or distinct pneumothorax. Redemonstrated moderate to large  hiatal hernia. Heart and mediastinal contours otherwise unremarkable.       Impression:      No evidence of acute disease in the chest.     This report was finalized on 12/10/2020 5:56 PM by David Escobedo.       CT Head Without Contrast [[id number]] Collected: 12/10/20 1653     Updated: 12/10/20 1756    Narrative:      EXAMINATION: CT HEAD WO CONTRAST 12/05/2020     INDICATION: Disoriented, difficult finding words.     TECHNIQUE: CT head without intravenous contrast.     The radiation dose reduction device was turned on for each scan per the  ALARA (As Low as Reasonably Achievable) protocol.     COMPARISON: CT head dated 11/30/2020.     FINDINGS: Heterogeneous extra-axial collection left frontal convexity  measuring up to 2.7 cm increase in size from prior comparison of  11/30/2020 where this was 2.2 along with increased at least moderate  hyperattenuation throughout of likely acute on chronic subdural hematoma  with increased mass effect and effacement left lateral ventricle with  rightward midline shift now 7 mm increased from prior comparison where  this was 4 mm. No intraventricular hemorrhage extension. Globes and  orbits unremarkable. Paranasal sinuses and mastoid air cells are grossly  clear and well pneumatized. Calvarium intact. Status post embolization  with coiling material in the right vertebral region posterior fossa as  seen on prior.       Impression:      Increased thickness up to 2.7 cm left extra-axial fluid  collection along the left frontoparietal convexity extending towards the  vertex with heterogeneous signal including hyperattenuation of acute on  chronic subdural hematoma with increased effacement left lateral  ventricle and increased rightward midline shift at the level of the  septum pellucidum  now 7 mm. No intraventricular hemorrhage extension or  hydrocephalus.     DICTATED:   12/10/2020  EDITED/ls :   12/10/2020            Results for orders placed during the hospital encounter of 07/31/16   Adult transthoracic echo complete    Narrative · All left ventricular wall segments contract normally.  · Mild to moderate tricuspid valve regurgitation is present.  · Left ventricular function is normal. Estimated EF = 65%.  · Left ventricular diastolic dysfunction (grade I) consistent with   impaired relaxation.  · Right ventricular cavity is borderline dilated.  · Mild pulmonary hypertension is present.          Assessment/Plan   Assessment & Plan     Active Hospital Problems    Diagnosis POA   • **Subdural hematoma (CMS/HCC) [S06.5X9A] Unknown     Added automatically from request for surgery [id number]     • Hypertension [I10] Yes   • Hypothyroid [E03.9] Yes     80-year-old female with prior history of intracranial aneurysm s/p embolization, hypothyroidism, hypertension, chronic subdural hematoma, previously on aspirin and Plavix.  Patient presents to the ED with 2 days of slurred speech, word finding difficulties, disorientation, ataxia admitted with an expanding subdural hematoma     Plan  Subdural hematoma  -Seems to be expanding by imaging as above  -Neurosurgery consulted, they reviewed imaging, plan is for craniotomy in the a.m., keep n.p.o. after midnight  -Follow-up preprocedure COVID-19 test    Hypertension-BP elevated in ED, discussed with Dr. Carey, please keep SBP <150s, resume home rx, cardene gtt in place    Hypothyroidism-continue Synthroid    DVT prophylaxis: mechanical    CODE STATUS:  full  Code Status and Medical Interventions:   Ordered at: 12/10/20 1829     Level Of Support Discussed With:    Patient     Code Status:    CPR     Medical Interventions (Level of Support Prior to Arrest):    Full       Admission Status:  I believe this patient meets INPATIENT status due to subdural hematoma will  require craniotomy I feel patient’s risk for adverse outcomes and need for care warrant INPATIENT evaluation and I predict the patient’s care encounter to likely last beyond 2 midnights.    Dawood Charles MD  12/10/20

## 2020-12-10 NOTE — TELEPHONE ENCOUNTER
79 yo woman seen in neurosurgery office on 11/30 with a chronic subdural.   Called and spoke with patient's daughter. She states that over the past two days she has been having trouble finding her words, stuttering and has slow speech. She reports her mother is dropping things and experiencing clumsiness. She is somewhat disoriented.   I have discussed her symptoms with Dr. Cary and we have recommended patient go to ED for evaluation. I have let the emergency department know. Patient and daughter are on their way, they live about 45 minutes away.

## 2020-12-10 NOTE — ED PROVIDER NOTES
Subjective   Ms. Willams is an 80-year-old female presents to the emergency department today complaining of having trouble finding words having some ataxia falling to the left, and little bit slower responses.  She has had a little bit of a headache that has not been severe.  She was diagnosed with a chronic subdural hematoma by Dr. Cary's office had her Plavix stopped but continued her aspirin.  When she contacted Dr. aCry's office today they had sent her here to the emergency department for further evaluation.  Upon arrival patient is a CT of the head was performed and indicated that that she had expansion of this.  She currently states she does not have a headache she still has a little trouble finding words she is not noticed any weakness but does walk falling to the left a little bit.  She had no new fall no new trauma.  They think that the first subdural hematoma came from 1 of 2 falls that she had prior.  States she had no exposure to the Covid virus.  She has had no previous swabs.  She has no other complaints.  She had had a previous aneurysm coiled by Dr. Cary several years ago.  She has not taken her Plavix in over 6 days.  She did take a baby aspirin today.      History provided by:  Patient   used: No    Neurologic Problem  The patient's primary symptoms include slurred speech. Primary symptoms comment: Trouble finding words, ataxia. This is a new problem. Episode onset: Past 48 hours. The neurological problem developed gradually. The problem is unchanged. There was no focality noted. Associated symptoms include headaches. Pertinent negatives include no abdominal pain, aura, back pain, bladder incontinence, bowel incontinence, chest pain, diaphoresis, light-headedness, neck pain, palpitations, shortness of breath, vertigo or vomiting. Past treatments include nothing. The treatment provided no relief. There is no history of a bleeding disorder, a clotting disorder, a CVA, dementia,  head trauma, liver disease, mood changes or seizures.       Review of Systems   Constitutional: Negative for chills and diaphoresis.   Respiratory: Negative for shortness of breath and wheezing.    Cardiovascular: Negative for chest pain and palpitations.   Gastrointestinal: Negative for abdominal pain, bowel incontinence and vomiting.   Genitourinary: Negative for bladder incontinence.   Musculoskeletal: Negative for back pain and neck pain.   Neurological: Positive for headaches. Negative for vertigo and light-headedness.   Hematological: Negative for adenopathy.   Psychiatric/Behavioral: Negative.    All other systems reviewed and are negative.      Past Medical History:   Diagnosis Date   • Arthritis    • Hypertension    • Hypothyroid        No Known Allergies    Past Surgical History:   Procedure Laterality Date   • CHOLECYSTECTOMY     • CRANIOTOMY FOR SUBDURAL HEMATOMA N/A 12/11/2020    Procedure: CRANIOTOMY FOR SUBDURAL HEMATOMA;  Surgeon: Jonas Cary MD;  Location: [facility] OR;  Service: Neurosurgery;  Laterality: N/A;   • HYSTERECTOMY     • WA PERM OCCLUSION/EMBOLIZATION,PERCUT,CNS N/A 4/17/2018    Procedure: IR intracranial embolization;  Surgeon: Jonas Cary MD;  Location:  [facility];  Service: Interventional Radiology   • WA PERM OCCLUSION/EMBOLIZATION,PERCUT,CNS N/A 6/26/2018    Procedure: IR intracranial embolization;  Surgeon: Jonas Cary MD;  Location:  [facility];  Service: Interventional Radiology   • WA SLCTV CATH INTRNL CAROTID ART ANGIO INTRCRNL ART Bilateral 3/27/2018    Procedure: IR internal carotid with angiography;  Surgeon: Jonas Cary MD;  Location:  [facility];  Service: Interventional Radiology   • WA SLCTV CATH INTRNL CAROTID ART ANGIO INTRCRNL ART Bilateral 4/17/2018    Procedure: IR internal carotid with angiography;  Surgeon: Jonas Cary MD;  Location:  [facility];  Service: Interventional Radiology   • CO SLCTV CATH INTRNL CAROTID ART ANGIO INTRCRNL ART Bilateral 6/26/2018    Procedure: IR internal carotid with angiography;  Surgeon: Jonas Cary MD;  Location:  [facility];  Service: Interventional Radiology   • CO SLCTV CATH VERTEBRAL ART ANGIO VERTEBRAL ARTERY Bilateral 3/27/2018    Procedure: IR vertebral artery with angiography;  Surgeon: Jonas Cary MD;  Location:  [facility];  Service: Interventional Radiology   • CO SLCTV CATH VERTEBRAL ART ANGIO VERTEBRAL ARTERY Bilateral 4/17/2018    Procedure: IR vertebral artery with angiography;  Surgeon: Jonas Cary MD;  Location:  [facility];  Service: Interventional Radiology   • CO SLCTV CATH VERTEBRAL ART ANGIO VERTEBRAL ARTERY Bilateral 6/26/2018    Procedure: IR vertebral artery with angiography;  Surgeon: Jonas Cary MD;  Location:  [facility];  Service: Interventional Radiology       Family History   Problem Relation Age of Onset   • Diabetes Mother    • Hypertension Mother    • Heart disease Father        Social History     Socioeconomic History   • Marital status:      Spouse name: Not on file   • Number of children: Not on file   • Years of education: Not on file   • Highest education level: Not on file   Tobacco Use   • Smoking status: Never Smoker   • Smokeless tobacco: Never Used   Substance and Sexual Activity   • Alcohol use: No   • Drug use: No   • Sexual activity: Defer           Objective   Physical Exam  Vitals signs and nursing note reviewed.   Constitutional:       General: She is not in acute distress.     Appearance: She is well-developed. She is not diaphoretic.      Comments: Warm pink dry afebrile nontoxic   HENT:      Head: Normocephalic and atraumatic.      Nose: Nose normal.   Eyes:      General: No scleral icterus.     Conjunctiva/sclera: Conjunctivae  normal.   Neck:      Musculoskeletal: Normal range of motion and neck supple.   Cardiovascular:      Rate and Rhythm: Normal rate and regular rhythm.      Heart sounds: Normal heart sounds. No murmur.   Pulmonary:      Effort: Pulmonary effort is normal. No respiratory distress.      Breath sounds: Normal breath sounds.   Abdominal:      General: Bowel sounds are normal.      Palpations: Abdomen is soft.      Tenderness: There is no abdominal tenderness.   Musculoskeletal: Normal range of motion.   Skin:     General: Skin is warm and dry.   Neurological:      Mental Status: She is alert and oriented to person, place, and time.      Comments: Occasional difficulty finding words with speech   Psychiatric:         Behavior: Behavior normal.         Procedures           ED Course  ED Course as of Dec 16 2242   Thu Dec 10, 2020   1722 Spoke to Ester Rodriguez the physician assistant work with Dr. Cary she states that both she and Dr. Cary have reviewed the CT scans they have ordered an MRI.  They want the patient admitted to the hospitalist and will plan on doing a craniotomy in the morning.  They have discussed this with the patient's daughter.  They have asked me to page the hospitalist for admission.    [MARY]   1732 Spoke to Dr. Charles he will admit the patient.  Dr. Cary's plan on taking the patient to the OR in the morning for craniotomy.    [MARY]      ED Course User Index  [MARY] Alfonso Adan PA                                   No results found for this or any previous visit (from the past 24 hour(s)).  Note: In addition to lab results from this visit, the labs listed above may include labs taken at another facility or during a different encounter within the last 24 hours. Please correlate lab times with ED admission and discharge times for further clarification of the services performed during this visit.    CT Head Without Contrast   Final Result   Evolving postsurgical changes from recent left frontal    subdural hematoma evacuation, without evidence of new hemorrhage, mass   or mass effect.           This report was finalized on 12/14/2020 8:15 AM by David Escobedo.          CT Head Without Contrast   Final Result   Continued resolution of left extra-axial fluid collection   with persistent emphysema and high density material.           This report was finalized on 12/13/2020 7:53 AM by Tao Ureña.          CT Head Without Contrast   Final Result   Demonstration of resolving postoperative changes of left   craniotomy. There is resolving emphysema and decreasing size of the   extra-axial fluid collection. Scattered areas of hyperdensity throughout   the residual fluid collection are redemonstrated and appears slightly   more confluent. These may represent age-indeterminate blood products.           This report was finalized on 12/12/2020 9:46 AM by Tao Ureña.          CT Head Without Contrast   Final Result   Postsurgical changes of left frontoparietal craniotomy with   subdural evacuation and drain placement. Expected postsurgical emphysema   is present. There is some persistent left extra-axial fluid and high   density material measuring up to 2 cm in thickness with minimal   rightward midline shift, overall slightly improved compared to prior.           This report was finalized on 12/11/2020 6:57 PM by Tao Ureña.          XR Lost Needle / Instrument   Final Result   Addendum 1 of 1   Addendum: Patient was removed from and fixation and subsequent   radiograph was obtained. There is no evidence of retained surgical   needle. Craniotomy changes are present.       Further findings were discussed with the operating room staff by David Escobedo at 2:59 PM same day       This report was finalized on 12/11/2020 3:02 PM by David Escobedo.          Final      MRI Brain With & Without Contrast   Final Result   Impression:   1.  Large left-sided subdural hematoma. The subdural hematoma is of mixed signal  intensity and measures approximately 2 cm in greatest diameter. There is 5.5 mm of midline shift to the right.      2.  Prominent area of susceptibility artifact near the right cerebellopontine angle likely representing the sequelae of prior aneurysm repair with embolization material within the aneurysm.               Signer Name: Scott Carey MD    Signed: 12/10/2020 8:10 PM    Workstation Name: [id number]     [facility]      XR Chest 1 View   Final Result   No evidence of acute disease in the chest.       This report was finalized on 12/10/2020 5:56 PM by David Escobedo.          CT Head Without Contrast   Final Result   Increased thickness up to 2.7 cm left extra-axial fluid   collection along the left frontoparietal convexity extending towards the   vertex with heterogeneous signal including hyperattenuation of acute on   chronic subdural hematoma with increased effacement left lateral   ventricle and increased rightward midline shift at the level of the   septum pellucidum now 7 mm. No intraventricular hemorrhage extension or   hydrocephalus.       DICTATED:   12/10/2020   EDITED/ls :   12/10/2020        This report was finalized on 12/11/2020 3:27 PM by Dr. Pablo Phoenix.            Vitals:    12/15/20 1300 12/15/20 1330 12/15/20 1400 12/15/20 1500   BP: 127/78 (!) 144/121 155/68 153/87   BP Location:   Left arm    Patient Position:   Lying    Pulse: 87 96 87 89   Resp:       Temp:       TempSrc:       SpO2: 94% 95% 99% 98%   Weight:       Height:         Medications   lidocaine PF 1% (XYLOCAINE) 1 % injection  - ADS Override Pull (  Not Given 12/14/20 1217)   gadobenate dimeglumine (MULTIHANCE) injection 15 mL (15 mL Intravenous Given 12/10/20 1934)   losartan (COZAAR) tablet 50 mg (50 mg Oral Given 12/10/20 2131)   cefepime (MAXIPIME) 2 g/100 mL 0.9% NS (mbp) (2 g Intravenous Given 12/11/20 1350)   cefepime (MAXIPIME) 2 g/100 mL 0.9% NS (mbp) (2 g Intravenous New Bag 12/12/20  0606)   dexamethasone (DECADRON) IVPB 10 mg (10 mg Intravenous New Bag 12/12/20 1026)   losartan (COZAAR) tablet 50 mg (50 mg Oral Given 12/14/20 1014)     ECG/EMG Results (last 24 hours)     Procedure Component Value Units Date/Time    ECG 12 Lead [[id number]] Collected: 12/10/20 1739     Updated: 12/10/20 1737        ECG 12 Lead   Final Result   Test Reason : pre op   Blood Pressure : **/** mmHG   Vent. Rate : 070 BPM     Atrial Rate : 070 BPM      P-R Int : 146 ms          QRS Dur : 084 ms       QT Int : 394 ms       P-R-T Axes : 063 066 048 degrees      QTc Int : 425 ms      ** Poor data quality, interpretation may be adversely affected   Normal sinus rhythm   Possible Left atrial enlargement   Cannot rule out Anterior infarct , age undetermined   Abnormal ECG   When compared with ECG of 17-APR-2018 07:41,   No significant change was found   Confirmed by ESTHER MORALES MD ([id number]) on 12/15/2020 10:17:05 AM      Referred By:  EDMD           Confirmed By:ESTHER MORALES MD                  MDM  Number of Diagnoses or Management Options  Ataxia: new and requires workup  Subdural hematoma (CMS/HCC): established and worsening     Amount and/or Complexity of Data Reviewed  Clinical lab tests: reviewed and ordered  Tests in the radiology section of CPT®: reviewed and ordered  Tests in the medicine section of CPT®: ordered and reviewed  Discuss the patient with other providers: yes    Patient Progress  Patient progress: stable      Final diagnoses:   Subdural hematoma (CMS/HCC)   Ataxia            Alfonso Adan PA  12/16/20 5829

## 2020-12-10 NOTE — CONSULTS
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  More recently, the patient was seen in our office 10 days ago after being found to have a subdural hematoma by her PCP.  At that time, patient was experiencing headaches but had no neurologic deficits.  The patient had been on ASA/Plavix. Her Plavix was discontinued/held at last office visit and she has remained on a 81 mg aspirin daily.      This afternoon, the patient's daughter called our office and reported that the patient had had 2 days of word finding difficulties, slurred speech, clumsiness and disorientation.  It was recommended the patient present to the emergency department for evaluation and imaging studies.  Upon my assessment, the patient's daughter is not with her.  The patient reports she has had difficulty getting her thoughts out and finding her words over the past 2 days.  She has continued to have a headache that is no worse than when seen in the office.  She reports clumsiness and dropping items over the last 2 days.  She denies diplopia, blurred vision, facial droop, weakness or numbness.  She is ambulatory and reports she has not had any falls in the last week.  She is awake, alert and oriented.      Review of Systems   Constitutional: Negative for activity change, appetite change, chills, diaphoresis, fatigue, fever and unexpected weight change.   HENT: Negative for trouble swallowing.    Eyes: Negative for photophobia and visual disturbance.   Respiratory: Negative for shortness of breath.    Cardiovascular: Negative for chest pain.   Gastrointestinal: Negative for abdominal pain, nausea and vomiting.   Musculoskeletal: Negative for gait problem.   Neurological: Positive for  speech difficulty, light-headedness and headaches. Negative for dizziness, tremors, seizures, syncope, facial asymmetry, weakness and numbness.   Psychiatric/Behavioral: Positive for decreased concentration.   All other systems reviewed and are negative.      History  Past Medical History:   Diagnosis Date   • Arthritis    • Hypertension    • Hypothyroid    ,   Past Surgical History:   Procedure Laterality Date   • CHOLECYSTECTOMY     • HYSTERECTOMY     • MI PERM OCCLUSION/EMBOLIZATION,PERCUT,CNS N/A 4/17/2018    Procedure: IR intracranial embolization;  Surgeon: Jonas Cary MD;  Location:  [facility];  Service: Interventional Radiology   • MI PERM OCCLUSION/EMBOLIZATION,PERCUT,CNS N/A 6/26/2018    Procedure: IR intracranial embolization;  Surgeon: Jonas Cary MD;  Location:  [facility];  Service: Interventional Radiology   • MI SLCTV CATH INTRNL CAROTID ART ANGIO INTRCRNL ART Bilateral 3/27/2018    Procedure: IR internal carotid with angiography;  Surgeon: Jonas Cary MD;  Location:  [facility];  Service: Interventional Radiology   • MI SLCTV CATH INTRNL CAROTID ART ANGIO INTRCRNL ART Bilateral 4/17/2018    Procedure: IR internal carotid with angiography;  Surgeon: Jonas Cary MD;  Location:  [facility];  Service: Interventional Radiology   • MI SLCTV CATH INTRNL CAROTID ART ANGIO INTRCRNL ART Bilateral 6/26/2018    Procedure: IR internal carotid with angiography;  Surgeon: Jonas Cary MD;  Location:  [facility];  Service: Interventional Radiology   • MI SLCTV CATH VERTEBRAL ART ANGIO VERTEBRAL ARTERY Bilateral 3/27/2018    Procedure: IR vertebral artery with angiography;  Surgeon: Jonas Cary MD;  Location:  [facility];  Service: Interventional Radiology   • MI SLCTV CATH VERTEBRAL ART ANGIO VERTEBRAL ARTERY Bilateral 4/17/2018     Procedure: IR vertebral artery with angiography;  Surgeon: Jonas Cary MD;  Location:  [facility];  Service: Interventional Radiology   • OR SLCTV CATH VERTEBRAL ART ANGIO VERTEBRAL ARTERY Bilateral 6/26/2018    Procedure: IR vertebral artery with angiography;  Surgeon: Jonas Cary MD;  Location:  [facility];  Service: Interventional Radiology   ,   Family History   Problem Relation Age of Onset   • Diabetes Mother    • Hypertension Mother    • Heart disease Father    ,   Social History     Tobacco Use   • Smoking status: Never Smoker   • Smokeless tobacco: Never Used   Substance Use Topics   • Alcohol use: No   • Drug use: No     E-cigarette/Vaping     E-cigarette/Vaping Substances     E-cigarette/Vaping Devices       , (Not in a hospital admission)  , Scheduled Meds:  , Continuous Infusions:  No current facility-administered medications for this encounter.   , PRN Meds:   and Allergies:  Patient has no known allergies.   SMOKING STATUS: Never smoker   Objective     Vital Signs   Temp:  [98 °F (36.7 °C)] 98 °F (36.7 °C)  Heart Rate:  [75] 75  Resp:  [18] 18  BP: (157)/(82) 157/82  Body mass index is 31.28 kg/m².    Physical Exam:  Physical Exam  Constitutional:       General: She is not in acute distress.     Appearance: Normal appearance. She is not ill-appearing, toxic-appearing or diaphoretic.   HENT:      Head: Normocephalic and atraumatic.   Eyes:      General: No visual field deficit.     Extraocular Movements: Extraocular movements intact.      Right eye: Normal extraocular motion and no nystagmus.      Left eye: Normal extraocular motion and no nystagmus.      Conjunctiva/sclera: Conjunctivae normal.      Pupils: Pupils are equal, round, and reactive to light.   Neck:      Musculoskeletal: Normal range of motion. No neck rigidity.   Pulmonary:      Effort: Pulmonary effort is normal.   Skin:     General: Skin is warm and dry.   Neurological:       Mental Status: She is alert.      GCS: GCS eye subscore is 4. GCS verbal subscore is 5. GCS motor subscore is 6.      Cranial Nerves: Cranial nerves are intact. No dysarthria or facial asymmetry.      Sensory: Sensation is intact.      Motor: Motor function is intact. No tremor, abnormal muscle tone or pronator drift.      Coordination: Coordination is intact. Finger-Nose-Finger Test and Heel to Shin Test normal.      Gait: Gait abnormal.      Comments: Patient is awake, alert and oriented. She is somewhat slow to respond but answers questions appropriately. Speech is fluent and intact. No pronator drift. Strength is intact to direct testing and symmetric in upper and lower extremities. Sensation in intact to light touch testing and symmetric throughout.   CN II-XII are grossly intact.    Slightly shuffles with ambulation    Psychiatric:         Mood and Affect: Mood normal.         Behavior: Behavior normal.         Results Review:   I reviewed the patient's new imaging results and agree with the interpretation.  Discussed with Dr. Cary       Assessment/Plan     Subdural Hematoma     This is an 81 yo woman with a known chronic subdural hematoma who presents to the emergency department with 2 days of word finding difficulty, clumsiness and disorientation. Dr. Cary has reviewed the patient's head CT and recommended admission to hospitalist service, obtain brain MRI (order in) and NPO at midnight. Plan for craniotomy for subdural hematoma tomorrow.      I discussed the plan with ED provider. Called and discussed with patient's daughter and all questions were answered.     I discussed the patients findings and my recommendations with patient    Meagan Palmer PA-C  12/10/20  17:01 EST

## 2020-12-11 ENCOUNTER — ANESTHESIA EVENT (OUTPATIENT)
Dept: PERIOP | Facility: HOSPITAL | Age: 80
End: 2020-12-11

## 2020-12-11 ENCOUNTER — APPOINTMENT (OUTPATIENT)
Dept: CT IMAGING | Facility: HOSPITAL | Age: 80
End: 2020-12-11

## 2020-12-11 ENCOUNTER — ANESTHESIA (OUTPATIENT)
Dept: PERIOP | Facility: HOSPITAL | Age: 80
End: 2020-12-11

## 2020-12-11 ENCOUNTER — APPOINTMENT (OUTPATIENT)
Dept: GENERAL RADIOLOGY | Facility: HOSPITAL | Age: 80
End: 2020-12-11

## 2020-12-11 LAB
ABO GROUP BLD: NORMAL
ALBUMIN SERPL-MCNC: 3.4 G/DL (ref 3.5–5.2)
ALBUMIN/GLOB SERPL: 1.1 G/DL
ALP SERPL-CCNC: 138 U/L (ref 39–117)
ALT SERPL W P-5'-P-CCNC: 19 U/L (ref 1–33)
ANION GAP SERPL CALCULATED.3IONS-SCNC: 12 MMOL/L (ref 5–15)
AST SERPL-CCNC: 45 U/L (ref 1–32)
BASOPHILS # BLD AUTO: 0.01 10*3/MM3 (ref 0–0.2)
BASOPHILS NFR BLD AUTO: 0.2 % (ref 0–1.5)
BILIRUB SERPL-MCNC: 0.8 MG/DL (ref 0–1.2)
BLD GP AB SCN SERPL QL: NEGATIVE
BUN SERPL-MCNC: 13 MG/DL (ref 8–23)
BUN/CREAT SERPL: 17.1 (ref 7–25)
CALCIUM SPEC-SCNC: 9 MG/DL (ref 8.6–10.5)
CHLORIDE SERPL-SCNC: 102 MMOL/L (ref 98–107)
CO2 SERPL-SCNC: 22 MMOL/L (ref 22–29)
CREAT SERPL-MCNC: 0.76 MG/DL (ref 0.57–1)
DEPRECATED RDW RBC AUTO: 45.7 FL (ref 37–54)
EOSINOPHIL # BLD AUTO: 0 10*3/MM3 (ref 0–0.4)
EOSINOPHIL NFR BLD AUTO: 0 % (ref 0.3–6.2)
ERYTHROCYTE [DISTWIDTH] IN BLOOD BY AUTOMATED COUNT: 12.9 % (ref 12.3–15.4)
GFR SERPL CREATININE-BSD FRML MDRD: 73 ML/MIN/1.73
GLOBULIN UR ELPH-MCNC: 3.1 GM/DL
GLUCOSE SERPL-MCNC: 147 MG/DL (ref 65–99)
HCT VFR BLD AUTO: 38.1 % (ref 34–46.6)
HGB BLD-MCNC: 12.4 G/DL (ref 12–15.9)
IMM GRANULOCYTES # BLD AUTO: 0.02 10*3/MM3 (ref 0–0.05)
IMM GRANULOCYTES NFR BLD AUTO: 0.3 % (ref 0–0.5)
INR PPP: 1.13 (ref 0.85–1.16)
LYMPHOCYTES # BLD AUTO: 0.31 10*3/MM3 (ref 0.7–3.1)
LYMPHOCYTES NFR BLD AUTO: 5.1 % (ref 19.6–45.3)
MCH RBC QN AUTO: 31.8 PG (ref 26.6–33)
MCHC RBC AUTO-ENTMCNC: 32.5 G/DL (ref 31.5–35.7)
MCV RBC AUTO: 97.7 FL (ref 79–97)
MONOCYTES # BLD AUTO: 0.19 10*3/MM3 (ref 0.1–0.9)
MONOCYTES NFR BLD AUTO: 3.1 % (ref 5–12)
NEUTROPHILS NFR BLD AUTO: 5.57 10*3/MM3 (ref 1.7–7)
NEUTROPHILS NFR BLD AUTO: 91.3 % (ref 42.7–76)
NRBC BLD AUTO-RTO: 0 /100 WBC (ref 0–0.2)
PLATELET # BLD AUTO: 314 10*3/MM3 (ref 140–450)
PMV BLD AUTO: 10.5 FL (ref 6–12)
POTASSIUM SERPL-SCNC: 4 MMOL/L (ref 3.5–5.2)
PROT SERPL-MCNC: 6.5 G/DL (ref 6–8.5)
PROTHROMBIN TIME: 14.2 SECONDS (ref 11.5–14)
RBC # BLD AUTO: 3.9 10*6/MM3 (ref 3.77–5.28)
RH BLD: POSITIVE
SODIUM SERPL-SCNC: 136 MMOL/L (ref 136–145)
T&S EXPIRATION DATE: NORMAL
WBC # BLD AUTO: 6.1 10*3/MM3 (ref 3.4–10.8)

## 2020-12-11 PROCEDURE — 80053 COMPREHEN METABOLIC PANEL: CPT | Performed by: NEUROLOGICAL SURGERY

## 2020-12-11 PROCEDURE — 99232 SBSQ HOSP IP/OBS MODERATE 35: CPT | Performed by: INTERNAL MEDICINE

## 2020-12-11 PROCEDURE — 85025 COMPLETE CBC W/AUTO DIFF WBC: CPT | Performed by: NEUROLOGICAL SURGERY

## 2020-12-11 PROCEDURE — 86901 BLOOD TYPING SEROLOGIC RH(D): CPT | Performed by: NEUROLOGICAL SURGERY

## 2020-12-11 PROCEDURE — C1713 ANCHOR/SCREW BN/BN,TIS/BN: HCPCS | Performed by: NEUROLOGICAL SURGERY

## 2020-12-11 PROCEDURE — 25010000002 DEXAMETHASONE PER 1 MG: Performed by: NURSE ANESTHETIST, CERTIFIED REGISTERED

## 2020-12-11 PROCEDURE — 86900 BLOOD TYPING SEROLOGIC ABO: CPT | Performed by: NEUROLOGICAL SURGERY

## 2020-12-11 PROCEDURE — 25010000002 PROPOFOL 10 MG/ML EMULSION: Performed by: NURSE ANESTHETIST, CERTIFIED REGISTERED

## 2020-12-11 PROCEDURE — 70450 CT HEAD/BRAIN W/O DYE: CPT

## 2020-12-11 PROCEDURE — 25010000002 ONDANSETRON PER 1 MG: Performed by: NURSE ANESTHETIST, CERTIFIED REGISTERED

## 2020-12-11 PROCEDURE — 86850 RBC ANTIBODY SCREEN: CPT | Performed by: NEUROLOGICAL SURGERY

## 2020-12-11 PROCEDURE — 00C40ZZ EXTIRPATION OF MATTER FROM INTRACRANIAL SUBDURAL SPACE, OPEN APPROACH: ICD-10-PCS | Performed by: NEUROLOGICAL SURGERY

## 2020-12-11 PROCEDURE — 25010000002 CEFEPIME PER 500 MG: Performed by: NEUROLOGICAL SURGERY

## 2020-12-11 PROCEDURE — 99232 SBSQ HOSP IP/OBS MODERATE 35: CPT | Performed by: HOSPITALIST

## 2020-12-11 PROCEDURE — 25010000002 FENTANYL CITRATE (PF) 100 MCG/2ML SOLUTION: Performed by: NURSE ANESTHETIST, CERTIFIED REGISTERED

## 2020-12-11 PROCEDURE — 85610 PROTHROMBIN TIME: CPT | Performed by: NEUROLOGICAL SURGERY

## 2020-12-11 PROCEDURE — 25010000002 CEFEPIME 2 G/NS 100 ML SOLUTION: Performed by: PHYSICIAN ASSISTANT

## 2020-12-11 DEVICE — WAX BONE HEMO AESCULAP 2.5GM: Type: IMPLANTABLE DEVICE | Site: CRANIAL | Status: FUNCTIONAL

## 2020-12-11 DEVICE — HEMOST ABS SURGIFOAM SZ100 8X12 10MM: Type: IMPLANTABLE DEVICE | Site: BRAIN | Status: FUNCTIONAL

## 2020-12-11 DEVICE — SCRW MATRIXNEURO SD TI 4MM: Type: IMPLANTABLE DEVICE | Site: CRANIAL | Status: FUNCTIONAL

## 2020-12-11 DEVICE — FLOSEAL HEMOSTATIC MATRIX, 10ML
Type: IMPLANTABLE DEVICE | Site: BRAIN | Status: FUNCTIONAL
Brand: FLOSEAL HEMOSTATIC MATRIX

## 2020-12-11 DEVICE — PLT MATRIXNEURO STR TI 2HL 12MM: Type: IMPLANTABLE DEVICE | Site: CRANIAL | Status: FUNCTIONAL

## 2020-12-11 RX ORDER — FENTANYL CITRATE 50 UG/ML
INJECTION, SOLUTION INTRAMUSCULAR; INTRAVENOUS AS NEEDED
Status: DISCONTINUED | OUTPATIENT
Start: 2020-12-11 | End: 2020-12-11 | Stop reason: SURG

## 2020-12-11 RX ORDER — ONDANSETRON 4 MG/1
4 TABLET, FILM COATED ORAL EVERY 6 HOURS PRN
Status: DISCONTINUED | OUTPATIENT
Start: 2020-12-11 | End: 2020-12-15 | Stop reason: HOSPADM

## 2020-12-11 RX ORDER — SODIUM CHLORIDE 0.9 % (FLUSH) 0.9 %
3 SYRINGE (ML) INJECTION EVERY 12 HOURS SCHEDULED
Status: DISCONTINUED | OUTPATIENT
Start: 2020-12-11 | End: 2020-12-11 | Stop reason: HOSPADM

## 2020-12-11 RX ORDER — HYDROMORPHONE HYDROCHLORIDE 1 MG/ML
0.5 INJECTION, SOLUTION INTRAMUSCULAR; INTRAVENOUS; SUBCUTANEOUS
Status: DISCONTINUED | OUTPATIENT
Start: 2020-12-11 | End: 2020-12-11 | Stop reason: HOSPADM

## 2020-12-11 RX ORDER — LIDOCAINE HYDROCHLORIDE 10 MG/ML
INJECTION, SOLUTION EPIDURAL; INFILTRATION; INTRACAUDAL; PERINEURAL AS NEEDED
Status: DISCONTINUED | OUTPATIENT
Start: 2020-12-11 | End: 2020-12-11 | Stop reason: SURG

## 2020-12-11 RX ORDER — HYDROMORPHONE HYDROCHLORIDE 1 MG/ML
0.5 INJECTION, SOLUTION INTRAMUSCULAR; INTRAVENOUS; SUBCUTANEOUS
Status: DISCONTINUED | OUTPATIENT
Start: 2020-12-11 | End: 2020-12-15 | Stop reason: HOSPADM

## 2020-12-11 RX ORDER — PROMETHAZINE HYDROCHLORIDE 25 MG/1
25 TABLET ORAL ONCE AS NEEDED
Status: DISCONTINUED | OUTPATIENT
Start: 2020-12-11 | End: 2020-12-11 | Stop reason: HOSPADM

## 2020-12-11 RX ORDER — SODIUM CHLORIDE, SODIUM LACTATE, POTASSIUM CHLORIDE, CALCIUM CHLORIDE 600; 310; 30; 20 MG/100ML; MG/100ML; MG/100ML; MG/100ML
INJECTION, SOLUTION INTRAVENOUS CONTINUOUS PRN
Status: DISCONTINUED | OUTPATIENT
Start: 2020-12-11 | End: 2020-12-11 | Stop reason: SURG

## 2020-12-11 RX ORDER — IPRATROPIUM BROMIDE AND ALBUTEROL SULFATE 2.5; .5 MG/3ML; MG/3ML
3 SOLUTION RESPIRATORY (INHALATION) ONCE AS NEEDED
Status: DISCONTINUED | OUTPATIENT
Start: 2020-12-11 | End: 2020-12-11 | Stop reason: HOSPADM

## 2020-12-11 RX ORDER — PROMETHAZINE HYDROCHLORIDE 25 MG/1
25 SUPPOSITORY RECTAL ONCE AS NEEDED
Status: DISCONTINUED | OUTPATIENT
Start: 2020-12-11 | End: 2020-12-11 | Stop reason: HOSPADM

## 2020-12-11 RX ORDER — HYDROCODONE BITARTRATE AND ACETAMINOPHEN 5; 325 MG/1; MG/1
1 TABLET ORAL ONCE AS NEEDED
Status: DISCONTINUED | OUTPATIENT
Start: 2020-12-11 | End: 2020-12-11 | Stop reason: HOSPADM

## 2020-12-11 RX ORDER — GINSENG 100 MG
CAPSULE ORAL AS NEEDED
Status: DISCONTINUED | OUTPATIENT
Start: 2020-12-11 | End: 2020-12-11 | Stop reason: HOSPADM

## 2020-12-11 RX ORDER — DEXAMETHASONE SODIUM PHOSPHATE 4 MG/ML
INJECTION, SOLUTION INTRA-ARTICULAR; INTRALESIONAL; INTRAMUSCULAR; INTRAVENOUS; SOFT TISSUE AS NEEDED
Status: DISCONTINUED | OUTPATIENT
Start: 2020-12-11 | End: 2020-12-11 | Stop reason: SURG

## 2020-12-11 RX ORDER — DOCUSATE SODIUM 100 MG/1
100 CAPSULE, LIQUID FILLED ORAL 2 TIMES DAILY PRN
Status: DISCONTINUED | OUTPATIENT
Start: 2020-12-11 | End: 2020-12-15 | Stop reason: HOSPADM

## 2020-12-11 RX ORDER — NALOXONE HCL 0.4 MG/ML
0.4 VIAL (ML) INJECTION AS NEEDED
Status: DISCONTINUED | OUTPATIENT
Start: 2020-12-11 | End: 2020-12-11 | Stop reason: HOSPADM

## 2020-12-11 RX ORDER — ONDANSETRON 2 MG/ML
INJECTION INTRAMUSCULAR; INTRAVENOUS AS NEEDED
Status: DISCONTINUED | OUTPATIENT
Start: 2020-12-11 | End: 2020-12-11 | Stop reason: SURG

## 2020-12-11 RX ORDER — FENTANYL CITRATE 50 UG/ML
50 INJECTION, SOLUTION INTRAMUSCULAR; INTRAVENOUS
Status: DISCONTINUED | OUTPATIENT
Start: 2020-12-11 | End: 2020-12-11 | Stop reason: HOSPADM

## 2020-12-11 RX ORDER — NICARDIPINE HYDROCHLORIDE 2.5 MG/ML
INJECTION INTRAVENOUS AS NEEDED
Status: DISCONTINUED | OUTPATIENT
Start: 2020-12-11 | End: 2020-12-11 | Stop reason: SURG

## 2020-12-11 RX ORDER — PROPOFOL 10 MG/ML
VIAL (ML) INTRAVENOUS AS NEEDED
Status: DISCONTINUED | OUTPATIENT
Start: 2020-12-11 | End: 2020-12-11 | Stop reason: SURG

## 2020-12-11 RX ORDER — DROPERIDOL 2.5 MG/ML
0.62 INJECTION, SOLUTION INTRAMUSCULAR; INTRAVENOUS AS NEEDED
Status: DISCONTINUED | OUTPATIENT
Start: 2020-12-11 | End: 2020-12-11 | Stop reason: HOSPADM

## 2020-12-11 RX ORDER — SODIUM CHLORIDE 0.9 % (FLUSH) 0.9 %
3-10 SYRINGE (ML) INJECTION AS NEEDED
Status: DISCONTINUED | OUTPATIENT
Start: 2020-12-11 | End: 2020-12-11 | Stop reason: HOSPADM

## 2020-12-11 RX ORDER — DROPERIDOL 2.5 MG/ML
0.62 INJECTION, SOLUTION INTRAMUSCULAR; INTRAVENOUS ONCE AS NEEDED
Status: DISCONTINUED | OUTPATIENT
Start: 2020-12-11 | End: 2020-12-11 | Stop reason: HOSPADM

## 2020-12-11 RX ORDER — HYDROCODONE BITARTRATE AND ACETAMINOPHEN 5; 325 MG/1; MG/1
1 TABLET ORAL EVERY 4 HOURS PRN
Status: DISCONTINUED | OUTPATIENT
Start: 2020-12-11 | End: 2020-12-15 | Stop reason: HOSPADM

## 2020-12-11 RX ORDER — LIDOCAINE HYDROCHLORIDE AND EPINEPHRINE 5; 5 MG/ML; UG/ML
INJECTION, SOLUTION INFILTRATION; PERINEURAL AS NEEDED
Status: DISCONTINUED | OUTPATIENT
Start: 2020-12-11 | End: 2020-12-11 | Stop reason: HOSPADM

## 2020-12-11 RX ORDER — ONDANSETRON 2 MG/ML
4 INJECTION INTRAMUSCULAR; INTRAVENOUS EVERY 6 HOURS PRN
Status: DISCONTINUED | OUTPATIENT
Start: 2020-12-11 | End: 2020-12-15 | Stop reason: HOSPADM

## 2020-12-11 RX ORDER — ROCURONIUM BROMIDE 10 MG/ML
INJECTION, SOLUTION INTRAVENOUS AS NEEDED
Status: DISCONTINUED | OUTPATIENT
Start: 2020-12-11 | End: 2020-12-11 | Stop reason: SURG

## 2020-12-11 RX ORDER — AMOXICILLIN 250 MG
1 CAPSULE ORAL NIGHTLY PRN
Status: DISCONTINUED | OUTPATIENT
Start: 2020-12-11 | End: 2020-12-15 | Stop reason: HOSPADM

## 2020-12-11 RX ORDER — POLYETHYLENE GLYCOL 3350 17 G/17G
17 POWDER, FOR SOLUTION ORAL DAILY PRN
Status: DISCONTINUED | OUTPATIENT
Start: 2020-12-11 | End: 2020-12-15 | Stop reason: HOSPADM

## 2020-12-11 RX ORDER — MAGNESIUM HYDROXIDE 1200 MG/15ML
LIQUID ORAL AS NEEDED
Status: DISCONTINUED | OUTPATIENT
Start: 2020-12-11 | End: 2020-12-11 | Stop reason: HOSPADM

## 2020-12-11 RX ORDER — PHENYLEPHRINE HCL IN 0.9% NACL 0.5 MG/5ML
.5-3 SYRINGE (ML) INTRAVENOUS
Status: DISCONTINUED | OUTPATIENT
Start: 2020-12-11 | End: 2020-12-14

## 2020-12-11 RX ORDER — ONDANSETRON 2 MG/ML
4 INJECTION INTRAMUSCULAR; INTRAVENOUS ONCE AS NEEDED
Status: DISCONTINUED | OUTPATIENT
Start: 2020-12-11 | End: 2020-12-11 | Stop reason: HOSPADM

## 2020-12-11 RX ORDER — NALOXONE HCL 0.4 MG/ML
0.4 VIAL (ML) INJECTION
Status: DISCONTINUED | OUTPATIENT
Start: 2020-12-11 | End: 2020-12-15 | Stop reason: HOSPADM

## 2020-12-11 RX ADMIN — FENTANYL CITRATE 50 MCG: 50 INJECTION, SOLUTION INTRAMUSCULAR; INTRAVENOUS at 13:52

## 2020-12-11 RX ADMIN — HYDROCODONE BITARTRATE AND ACETAMINOPHEN 1 TABLET: 5; 325 TABLET ORAL at 18:04

## 2020-12-11 RX ADMIN — NICARDIPINE HYDROCHLORIDE 5 MG/HR: 0.1 INJECTION, SOLUTION INTRAVENOUS at 15:15

## 2020-12-11 RX ADMIN — FENTANYL CITRATE 50 MCG: 50 INJECTION, SOLUTION INTRAMUSCULAR; INTRAVENOUS at 16:15

## 2020-12-11 RX ADMIN — Medication 0.5 MCG/KG/MIN: at 14:08

## 2020-12-11 RX ADMIN — EPHEDRINE SULFATE 20 MG: 50 INJECTION INTRAMUSCULAR; INTRAVENOUS; SUBCUTANEOUS at 14:07

## 2020-12-11 RX ADMIN — LOSARTAN POTASSIUM 50 MG: 50 TABLET, FILM COATED ORAL at 08:21

## 2020-12-11 RX ADMIN — HYDROCODONE BITARTRATE AND ACETAMINOPHEN 1 TABLET: 5; 325 TABLET ORAL at 22:08

## 2020-12-11 RX ADMIN — REMIFENTANIL HYDROCHLORIDE 0.2 MCG/KG/MIN: 1 INJECTION, POWDER, LYOPHILIZED, FOR SOLUTION INTRAVENOUS at 14:12

## 2020-12-11 RX ADMIN — SODIUM CHLORIDE, PRESERVATIVE FREE 10 ML: 5 INJECTION INTRAVENOUS at 08:22

## 2020-12-11 RX ADMIN — NICARDIPINE HYDROCHLORIDE 10 MG/HR: 0.1 INJECTION, SOLUTION INTRAVENOUS at 18:16

## 2020-12-11 RX ADMIN — NICARDIPINE HYDROCHLORIDE 2.5 MG/HR: 0.1 INJECTION, SOLUTION INTRAVENOUS at 11:28

## 2020-12-11 RX ADMIN — NICARDIPINE HYDROCHLORIDE 0.5 MG: 25 INJECTION INTRAVENOUS at 15:15

## 2020-12-11 RX ADMIN — DEXAMETHASONE SODIUM PHOSPHATE 4 MG: 4 INJECTION, SOLUTION INTRAMUSCULAR; INTRAVENOUS at 14:32

## 2020-12-11 RX ADMIN — ONDANSETRON 4 MG: 2 INJECTION INTRAMUSCULAR; INTRAVENOUS at 14:32

## 2020-12-11 RX ADMIN — PROPOFOL 25 MCG/KG/MIN: 10 INJECTION, EMULSION INTRAVENOUS at 14:12

## 2020-12-11 RX ADMIN — NICARDIPINE HYDROCHLORIDE 7.5 MG/HR: 0.1 INJECTION, SOLUTION INTRAVENOUS at 20:11

## 2020-12-11 RX ADMIN — PROPOFOL 120 MG: 10 INJECTION, EMULSION INTRAVENOUS at 13:52

## 2020-12-11 RX ADMIN — CEFEPIME 2 G: 2 INJECTION, POWDER, FOR SOLUTION INTRAVENOUS at 13:50

## 2020-12-11 RX ADMIN — LEVOTHYROXINE SODIUM 75 MCG: 75 TABLET ORAL at 06:17

## 2020-12-11 RX ADMIN — NICARDIPINE HYDROCHLORIDE 5 MG/HR: 0.1 INJECTION, SOLUTION INTRAVENOUS at 04:06

## 2020-12-11 RX ADMIN — LIDOCAINE HYDROCHLORIDE 50 MG: 10 INJECTION, SOLUTION EPIDURAL; INFILTRATION; INTRACAUDAL; PERINEURAL at 13:52

## 2020-12-11 RX ADMIN — CEFEPIME HYDROCHLORIDE 2 G: 2 INJECTION, POWDER, FOR SOLUTION INTRAVENOUS at 22:08

## 2020-12-11 RX ADMIN — SUGAMMADEX 200 MG: 100 INJECTION, SOLUTION INTRAVENOUS at 14:44

## 2020-12-11 RX ADMIN — ROCURONIUM BROMIDE 50 MG: 10 INJECTION INTRAVENOUS at 13:52

## 2020-12-11 RX ADMIN — SODIUM CHLORIDE, POTASSIUM CHLORIDE, SODIUM LACTATE AND CALCIUM CHLORIDE: 600; 310; 30; 20 INJECTION, SOLUTION INTRAVENOUS at 13:48

## 2020-12-11 NOTE — OP NOTE
Preoperative diagnosis:   1.  Left-sided chronic subdural  2.  Brain compression of clinical significance  Postoperative diagnosis: Same    Procedures performed:  1.  Left sided craniotomy for evacuation of subdural hematoma  2.  Use of intraoperative image navigation for intradural target    Procedure in detail: The patient was brought directly from her hospital room to the operating suite where she underwent the uneventful induction of general, endotracheal anesthesia.  Venodyne's were placed by the nursing staff.  The bed was rotated 90 degrees from anesthesia and the patient's head was rotated to the right.  The patient's head was placed in the Korbel head castillo with 3 points of fixation.  Using the navigation system, a surgical incision was marked out over the subdural where it was at its maximum thickness over the left parietal boss.  The overlying skin was then shaved, prepped, and draped in usual, sterile fashion.  A timeout was performed.  Intravenous antibiotics were ministered.  After anesthetizing the skin, a longitudinally oriented skin incision was then made using a #15 blade.  Hemostasis was achieved using bipolar electrocautery.  Self-retaining retractor was placed.  A bur hole was placed at the posterior aspect of the skin opening.  A craniotomy was turned in the standard fashion.  The dura was coagulated and opened in a cruciate fashion.  Subacute blood products under pressure were then seen to egress from the dural opening.  The dural leaflets were coagulated and reflected back using stay sutures.  The subdural cavity was then copiously irrigated and there were some more clotted components of subdural which were then seen to flow out with the irrigation, however the vast majority of the subdural component was subacute blood product.  There was a dense membrane which was overlying the surface of the brain.  I gently elevated this and opened it using a #11 blade.  This membrane was partially  resected.  I then reirrigated the cavity and did identify some more components of acute blood which were underneath the chronic subdural membrane.  The wound was copiously irrigated.  I explored underneath the craniotomy and did not identify any other residual areas of residual hematoma.  Unfortunately, the brain did not make much of a reexpansion into the cavity.    A trauma style ventricular catheter was left in the subdural space and tunneled out through separate stab incision.  The dura was then reapproximated using Nurolon sutures.  The bone flap was then reattached using the plating system.  The galea and skin were closed in a single layer.  Glue and a sterile dressing were then applied.    A Nurolon needle was lost during the procedure.  A skull x-ray was performed and the needle was not seen intracranially.    Meagan Palmer, physician assistant was responsible for performing the following activities: Retraction, Suction, Irrigation, Suturing and Closing and their skilled assistance was necessary for the success of this case.

## 2020-12-11 NOTE — PROGRESS NOTES
Discharge Planning Assessment  [facility]     Patient Name: Gloria Willams  MRN: [id number]  Today's Date: 12/11/2020    Admit Date: 12/10/2020    Discharge Needs Assessment     Row Name 12/11/20 1528       Living Environment    Lives With  alone    Current Living Arrangements  home/apartment/condo    Family Caregiver if Needed  child(rubens), adult    Quality of Family Relationships  helpful;involved;supportive    Able to Return to Prior Arrangements  yes       Resource/Environmental Concerns    Resource/Environmental Concerns  none    Transportation Concerns  car, none       Transition Planning    Patient/Family Anticipates Transition to  inpatient rehabilitation facility    Transportation Anticipated  family or friend will provide       Discharge Needs Assessment    Readmission Within the Last 30 Days  no previous admission in last 30 days    Equipment Currently Used at Home  none    Equipment Needed After Discharge  -- Following for needs or if going to facility, will defer to facility.    Discharge Facility/Level of Care Needs  rehabilitation facility    Provided Post Acute Provider List?  Yes    Post Acute Provider List  Inpatient Rehab    Provided Post Acute Provider Quality & Resource List?  Yes    Post Acute Provider Quality and Resource List  Inpatient Rehab    Delivered To  Patient    Method of Delivery  In person    Patient's Choice of Community Agency(s)  [facility]        Discharge Plan     Row Name 12/11/20 1532       Plan    Plan  [facility]    Patient/Family in Agreement with Plan  yes    Plan Comments  Met with Ms. Willams at the bedside to initiate discharge planning.  Ms. Willams lives alone in [facility].  She states that her son, Poncho, lives next door and can assist her when needed.  She also has a daughter that lives in [place].    Ms. Willams is going to the OR today.  She states that she has never been to IPR or had home health in the past.  She states that if IPR is recommended, that she  would like to go to [facility].  Referral given to April with [facility] to follow for possible admission next week.  A prior auth with Ms. Willams's [employer] Medicare will be required for the rehab admission.    CM will continue to follow.    Final Discharge Disposition Code  03 - skilled nursing facility (SNF)        Continued Care and Services - Admitted Since 12/10/2020     Destination     Service Provider Request Status Selected Services Address Phone Fax Patient Preferred    [facility]  Pending - No Request Sent N/A [address] [place] [id number] [phone] --                Demographic Summary     Row Name 12/11/20 1517       General Information    Admission Type  inpatient    Arrived From  home    Reason for Consult  discharge planning    General Information Comments  PCP:  Sean Garcia       Contact Information    Permission Granted to Share Info With          Functional Status     Row Name 12/11/20 1520       Functional Status    Usual Activity Tolerance  moderate    Current Activity Tolerance  -- Pending post op PT/OT evaluation       Functional Status, IADL    Medications  independent    Meal Preparation  independent    Housekeeping  independent    Laundry  independent    Shopping  independent       Mental Status    General Appearance WDL  WDL        Psychosocial    No documentation.       Abuse/Neglect    No documentation.       Legal    No documentation.       Substance Abuse    No documentation.       Patient Forms    No documentation.           Ivette Bob RN

## 2020-12-11 NOTE — ANESTHESIA PROCEDURE NOTES
Airway  Urgency: elective    Date/Time: 12/11/2020 1:53 PM  Airway not difficult    General Information and Staff    Patient location during procedure: OR  CRNA: Scott Grubbs III, CRNA    Indications and Patient Condition  Indications for airway management: airway protection    Preoxygenated: yes  MILS not maintained throughout  Mask difficulty assessment: 0 - not attempted    Final Airway Details  Final airway type: endotracheal airway      Successful airway: ETT  Cuffed: yes   Successful intubation technique: direct laryngoscopy  Blade: Kimberly  Blade size: 3  ETT size (mm): 7.0  Cormack-Lehane Classification: grade I - full view of glottis  Placement verified by: chest auscultation and capnometry   Measured from: lips  ETT/EBT  to lips (cm): 21  Number of attempts at approach: 1    Additional Comments  Negative epigastric sounds, Breath sound equal bilaterally with symmetric chest rise and fall.

## 2020-12-11 NOTE — PROGRESS NOTES
Intensive Care Follow-up     Hospital:  LOS: 1 day   Ms. Gloria Willams, 80 y.o. 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  She had a fall approximately one month prior and had an ongoing headache and PCP ordered an MRI which showed a left frontal SDH. She was seen in the office by neurosurgery on 12/1/2020 and deemed non-surgical at that time. She was set up for one month follow up but instructed to go to ED if symptoms worsened or new ones developed. MRI again showed the large left sided SDH with 5.5 mm midline shift to the right.     Subjective   Interval History:    Today she was taken to the OR for surgical evacuation of the SHD and post-operatively was brought to Neuro ICU.  She is currently awake and follows commands.  Pain is well controlled and she denies nausea.             The patient's past medical, surgical and social history were reviewed and updated in Epic as appropriate.       Objective     Infusions:  niCARdipine, 5-15 mg/hr, Last Rate: 2.5 mg/hr (12/11/20 0814)      Medications:  cefepime, 2 g, Intravenous, Once  cefepime, 2 g, Intravenous, Once  levothyroxine, 75 mcg, Oral, Q AM  losartan, 50 mg, Oral, Daily  sodium chloride, 10 mL, Intravenous, Q12H      I reviewed the patient's medications.    Vital Sign Min/Max for last 24 hours  Temp  Min: 97.1 °F (36.2 °C)  Max: 98.2 °F (36.8 °C)   BP  Min: 123/66  Max: 194/94   Pulse  Min: 67  Max: 94   Resp  Min: 18  Max: 20   SpO2  Min: 89 %  Max: 99 %   No data recorded       Input/Output for last 24 hour shift  12/10 0701 - 12/11 0700  In: 240 [P.O.:240]  Out: 200  [Urine:200]        Physical Exam:     Constitutional:   Alert, cooperative, in no acute distress   Head:    Cranial dressing in place.  Drain in place.   Eyes:           Lids and lashes normal, conjunctivae and sclerae normal.  No icterus, no pallor, corneas clear, PER   ENMT:  Ears appear intact with no abnormalities noted     No oral lesions, no thrush, oral mucosa moist   Neck:  No adenopathy, supple, trachea midline, no thyromegaly, no JVD   Lungs/Resp:    Normal effort, symmetric chest rise, no crepitus, clear to      auscultation bilaterally, no chest wall tenderness                Heart/CV:   Regular rhythm and normal rate, normal S1 and S2, no            murmur   Abdomen/GI:    Normal bowel sounds, no masses, no organomegaly, soft        nontender, nondistended   :    Deferred   Extremities/MSK:  No clubbing or cyanosis.  Trace bilateral lower extremity edema.  Normal tone.    No deformities.    Pulses:  Pulses palpable and equal bilaterally   Skin:  No bleeding, bruising or rash   Heme/Lymph:  No cervical or supraclavicular adenopathy.   Neurologic:    Psychiatric:    Moves all extremities with no obvious focal motor deficit.  Cranial nerves 2 - 12 grossly intact  Oriented x 3, normal affect.    The above physical exam findings were reviewed and reflect exam findings as of today's exam.   Electronically signed by:Ky Kapoor MD 12/11/20 22:25 EST        Results from last 7 days   Lab Units 12/10/20  1759   WBC 10*3/mm3 4.20   HEMOGLOBIN g/dL 11.7*   PLATELETS 10*3/mm3 294     Results from last 7 days   Lab Units 12/10/20  1759   SODIUM mmol/L 136   POTASSIUM mmol/L 4.4   CO2 mmol/L 24.0   BUN mg/dL 19   CREATININE mg/dL 0.82   GLUCOSE mg/dL 92     Estimated Creatinine Clearance: 51.9 mL/min (by C-G formula based on SCr of 0.82 mg/dL).              I reviewed the patient's new clinical results.  I reviewed the patient's new imaging results/reports including actual images and agree with reports.        CT head 12/11/2020:     IMPRESSION:  Postsurgical changes of left frontoparietal craniotomy with  subdural evacuation and drain placement. Expected postsurgical emphysema  is present. There is some persistent left extra-axial fluid and high  density material measuring up to 2 cm in thickness with minimal  rightward midline shift, overall slightly improved compared to prior.      Assessment/Plan   Impression        Subdural hematoma (CMS/HCC)    Hypothyroid    Hypertension    80 y.o. 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  She was found to have a large left-sided subdural hematoma on MRI which was previously seen on CT scan of the head on 11/30/2020.  She was taken to the operating room by Dr. Cary on 12/11/2020 and underwent left sided craniotomy for evacuation of subdural hematoma.  She was transferred to the intensive care unit postoperatively.    Patient currently feels that she is improved.  She is doing well postoperatively.       Plan        Monitor neuro status  Continue levothyroxine  Monitor blood pressure  Continue losartan  Cardene as needed              SHARAN Elise, [facility]  Pulmonary & Critical Care Medicine     I performed an independent history and physical examination. Portions of the history were obtained by SHARAN and were modified by me according to my findings. The above note reflects my findings, assessment, and plan.    Electronically signed by:  Ky Kapoor MD  12/11/20  22:33 EST

## 2020-12-11 NOTE — PROGRESS NOTES
Chief complaint: Subdural hematoma, expressive aphasia, right-sided weakness    Admit Diagnosis:   Subdural hematoma (CMS/HCC) [S06.5X9A]  Subdural hematoma (CMS/HCC) [S06.5X9A]     Subjective: No events overnight    Objective:    Vitals:    20 0800   BP: 141/68   Pulse: 78   Resp:    Temp:    SpO2:      Pulse  Av.4  Min: 67  Max: 94  Systolic (24hrs), Av , Min:123 , Max:194     Diastolic (24hrs), Av, Min:57, Max:94    Temp (24hrs), Av.7 °F (36.5 °C), Min:97.1 °F (36.2 °C), Max:98.2 °F (36.8 °C)      Mild right-sided pronator drift.  Mild impairment in terms of repetition.  Naming is intact.    CT and MRI demonstrate a large convexity subacute extra-axial fluid collection consistent with a subdural hematoma    Lab Results   Component Value Date     12/10/2020       A/P:   Informed consent for a left-sided image guided craniotomy was obtained from the patient.  I attempted to call her son and her daughter on the phone, but both attempts went to voicemail.  She is scheduled for approximately noon today.  N.p.o. for now.

## 2020-12-11 NOTE — PLAN OF CARE
Goal Outcome Evaluation:      Patient is A&Ox4. NSR on tele and on room air. NPO since midnight. SBP to be kept below 150s and her SBP started to trend up, so cardene drip was started at 2.5. Getting BPs every 30 minutes. Patient going for a craniotomy today. Patient did not have a type and screen done, so Lou TSANG was called and she ordered it. Will continue to monitor.

## 2020-12-11 NOTE — ANESTHESIA POSTPROCEDURE EVALUATION
Patient: Gloria Willams    Procedure Summary     Date: 12/11/20 Room / Location:  [facility]  /  [facility]    Anesthesia Start: 1348 Anesthesia Stop: 1527    Procedure: CRANIOTOMY FOR SUBDURAL HEMATOMA (N/A Head) Diagnosis:       Subdural hematoma (CMS/HCC)      (Subdural hematoma (CMS/HCC) [S06.5X9A])    Surgeon: Jonas Cary MD Provider: Yong Gonzáles MD    Anesthesia Type: general ASA Status: 4          Anesthesia Type: general    Vitals  Vitals Value Taken Time   /62 12/11/20 1524   Temp     Pulse 94 12/11/20 1527   Resp     SpO2 99 % 12/11/20 1527   Vitals shown include unvalidated device data.        Post Anesthesia Care and Evaluation    Patient location during evaluation: PACU  Patient participation: complete - patient participated  Level of consciousness: awake and alert  Pain management: adequate  Airway patency: patent  Anesthetic complications: No anesthetic complications  PONV Status: none  Cardiovascular status: hemodynamically stable and acceptable  Respiratory status: nonlabored ventilation, acceptable and nasal cannula  Hydration status: acceptable

## 2020-12-11 NOTE — PROGRESS NOTES
[facility] Medicine Services  PROGRESS NOTE    Patient Name: Gloria Willams  : 1940  MRN: [id number]    Date of Admission: 12/10/2020  Primary Care Physician: Sean Garcia MD    Subjective   Subjective     CC:  Ataxia/slurred speech    HPI:  No issues. Awaiting surgery.    Review of Systems   Constitutional: Positive for activity change and fatigue.   HENT: Negative.    Respiratory: Negative.    Cardiovascular: Negative.    Gastrointestinal: Negative.    Genitourinary: Negative.    Psychiatric/Behavioral: Negative.      Objective   Objective     Vital Signs:   Temp:  [97.1 °F (36.2 °C)-98.2 °F (36.8 °C)] 97.1 °F (36.2 °C)  Heart Rate:  [67-94] 84  Resp:  [18-20] 20  BP: (123-194)/(57-94) 141/67        Physical Exam:  NAD, alert and oriented  Son at bedside  OP clear, MMM  PERRL  Neck supple  RRR  Decreased at bases  +BS, ND, NT, soft  ARANA  No rashes  Speech slow    Results Reviewed:  Results from last 7 days   Lab Units 12/10/20  175   WBC 10*3/mm3 4.20   HEMOGLOBIN g/dL 11.7*   HEMATOCRIT % 38.0   PLATELETS 10*3/mm3 294   INR  1.09     Results from last 7 days   Lab Units 12/10/20  1759   SODIUM mmol/L 136   POTASSIUM mmol/L 4.4   CHLORIDE mmol/L 102   CO2 mmol/L 24.0   BUN mg/dL 19   CREATININE mg/dL 0.82   GLUCOSE mg/dL 92   CALCIUM mg/dL 9.4   ALT (SGPT) U/L 10   AST (SGOT) U/L 19     Estimated Creatinine Clearance: 51.9 mL/min (by C-G formula based on SCr of 0.82 mg/dL).    Microbiology Results Abnormal     Procedure Component Value - Date/Time    COVID PRE-OP / PRE-PROCEDURE SCREENING ORDER (NO ISOLATION) - Swab, Nasopharynx [[id number]]  (Normal) Collected: 12/10/20 1759    Lab Status: Final result Specimen: Swab from Nasopharynx Updated: 12/10/20 1841    Narrative:      The following orders were created for panel order COVID PRE-OP / PRE-PROCEDURE SCREENING ORDER (NO ISOLATION) - Swab, Nasopharynx.  Procedure                               Abnormality         Status                      ---------                               -----------         ------                     COVID-19, ABBOTT IN-HOUS...[[id number]]  Normal              Final result                 Please view results for these tests on the individual orders.    COVID-19, ABBOTT IN-HOUSE,NP Swab (NO TRANSPORT MEDIA) 2 HR TAT - Swab, Nasopharynx [[id number]]  (Normal) Collected: 12/10/20 1759    Lab Status: Final result Specimen: Swab from Nasopharynx Updated: 12/10/20 1841     COVID19 Presumptive Negative    Narrative:      Fact sheet for providers: https://www.fda.gov/media/836280/download     Fact sheet for patients: https://www.fda.gov/media/029253/download    Test performed by PCR.  If inconsistent with clinical signs and symptoms patient should be tested with different authorized molecular test.          Imaging Results (Last 24 Hours)     Procedure Component Value Units Date/Time    MRI Brain With & Without Contrast [[id number]] Collected: 12/10/20 2010     Updated: 12/10/20 2013    Narrative:         MRI Brain WO W     History: Left-sided subdural hematoma. Ataxia and headache with difficulty finding words.    Technique: Sagittal, axial and coronal images of the brain were obtained using the standard protocol. Pre and postcontrast imaging was performed. The patient was given 20 cc of gadolinium.    Comparison: CT of the head of December 10, 2020    Findings:  The diffusion sequence demonstrates no evidence of restricted diffusion to indicate acute infarction. The FLAIR and gradient echo sequences demonstrate a large left-sided subdural hematoma. This hematoma extends from the left frontal area to the left  parietal area. Subdural hematoma is of mixed signal intensity. It measures approximately 2 cm in greatest diameter. There is approximately 5.5 mm of midline shift to the right.    The basal cisterns are patent. The cervicomedullary junction is normal. There is demonstrated a prominent area of susceptibility  artifact near the right cerebellopontine angle. This likely represents the sequelae of the prior aneurysm repair with  embolization material within the aneurysm.    There are scattered areas of FLAIR hyperintensity in the periventricular and subcortical white matter representing mild microvascular disease.    7th and 8th cranial nerve complexes are within normal limits. The pituitary appears unremarkable. Mastoid air cells and paranasal sinuses are clear. No acute orbital findings.      Impression:      Impression:  1.  Large left-sided subdural hematoma. The subdural hematoma is of mixed signal intensity and measures approximately 2 cm in greatest diameter. There is 5.5 mm of midline shift to the right.    2.  Prominent area of susceptibility artifact near the right cerebellopontine angle likely representing the sequelae of prior aneurysm repair with embolization material within the aneurysm.          Signer Name: Scott Carey MD   Signed: 12/10/2020 8:10 PM   Workstation Name: [id number]    [facility]    XR Chest 1 View [[id number]] Collected: 12/10/20 1756     Updated: 12/10/20 1759    Narrative:      EXAMINATION: XR CHEST 1 VW-      INDICATION: pre op; S06.5Q6X-Grssyqozp subdural hemorrhage with loss of  consciousness of unspecified duration, initial encounter; R27.0-Ataxia,  unspecified      COMPARISON: 7/31/2016     FINDINGS: No focal airspace consolidation. No significant pleural  effusion or distinct pneumothorax. Redemonstrated moderate to large  hiatal hernia. Heart and mediastinal contours otherwise unremarkable.       Impression:      No evidence of acute disease in the chest.     This report was finalized on 12/10/2020 5:56 PM by David Escobedo.       CT Head Without Contrast [[id number]] Collected: 12/10/20 1653     Updated: 12/10/20 1756    Narrative:      EXAMINATION: CT HEAD WO CONTRAST 12/05/2020     INDICATION: Disoriented, difficult finding words.     TECHNIQUE: CT  head without intravenous contrast.     The radiation dose reduction device was turned on for each scan per the  ALARA (As Low as Reasonably Achievable) protocol.     COMPARISON: CT head dated 11/30/2020.     FINDINGS: Heterogeneous extra-axial collection left frontal convexity  measuring up to 2.7 cm increase in size from prior comparison of  11/30/2020 where this was 2.2 along with increased at least moderate  hyperattenuation throughout of likely acute on chronic subdural hematoma  with increased mass effect and effacement left lateral ventricle with  rightward midline shift now 7 mm increased from prior comparison where  this was 4 mm. No intraventricular hemorrhage extension. Globes and  orbits unremarkable. Paranasal sinuses and mastoid air cells are grossly  clear and well pneumatized. Calvarium intact. Status post embolization  with coiling material in the right vertebral region posterior fossa as  seen on prior.       Impression:      Increased thickness up to 2.7 cm left extra-axial fluid  collection along the left frontoparietal convexity extending towards the  vertex with heterogeneous signal including hyperattenuation of acute on  chronic subdural hematoma with increased effacement left lateral  ventricle and increased rightward midline shift at the level of the  septum pellucidum now 7 mm. No intraventricular hemorrhage extension or  hydrocephalus.     DICTATED:   12/10/2020  EDITED/ls :   12/10/2020              Results for orders placed during the hospital encounter of 07/31/16   Adult transthoracic echo complete    Narrative · All left ventricular wall segments contract normally.  · Mild to moderate tricuspid valve regurgitation is present.  · Left ventricular function is normal. Estimated EF = 65%.  · Left ventricular diastolic dysfunction (grade I) consistent with   impaired relaxation.  · Right ventricular cavity is borderline dilated.  · Mild pulmonary hypertension is present.          I have  reviewed the medications:  Scheduled Meds:cefepime, 2 g, Intravenous, Once  cefepime, 2 g, Intravenous, Once  levothyroxine, 75 mcg, Oral, Q AM  losartan, 50 mg, Oral, Daily  sodium chloride, 10 mL, Intravenous, Q12H      Continuous Infusions:niCARdipine, 5-15 mg/hr, Last Rate: 2.5 mg/hr (12/11/20 0814)      PRN Meds:.ondansetron  •  [COMPLETED] Insert peripheral IV **AND** sodium chloride  •  sodium chloride    Assessment/Plan   Assessment & Plan     Active Hospital Problems    Diagnosis  POA   • **Subdural hematoma (CMS/HCC) [S06.5X9A]  Unknown   • Hypertension [I10]  Yes   • Hypothyroid [E03.9]  Yes      Resolved Hospital Problems   No resolved problems to display.        Brief Hospital Course to date:  Gloria Willams is a 80 y.o. female here with expanding SDH    SDH  --craniotomy later today    HTN  --cardene as needed    Hypothyroidism  --synthroid    DVT Prophylaxis:  SCDS      Disposition: I expect the patient to be discharged to rehab post-op    CODE STATUS:   Code Status and Medical Interventions:   Ordered at: 12/10/20 1829     Level Of Support Discussed With:    Patient     Code Status:    CPR     Medical Interventions (Level of Support Prior to Arrest):    Full       Guille Helm MD  12/11/20

## 2020-12-11 NOTE — ANESTHESIA PREPROCEDURE EVALUATION
Anesthesia Evaluation     Patient summary reviewed and Nursing notes reviewed                Airway   Mallampati: II  TM distance: >3 FB  Neck ROM: full  No difficulty expected  Dental - normal exam     Pulmonary - negative pulmonary ROS and normal exam   Cardiovascular - normal exam    (+) hypertension,     ROS comment:   Echo 8/16: normal EF, mild-mod TR (RVSP 35-45)    Neuro/Psych- negative ROS    ROS Comment: S/p aneurysm coiling 6/18  SDH - large left sided; 5.5 mm midline shift  GI/Hepatic/Renal/Endo    (+) obesity,   thyroid problem hypothyroidism    Musculoskeletal     Abdominal  - normal exam    Bowel sounds: normal.   Substance History - negative use     OB/GYN negative ob/gyn ROS         Other   arthritis,                      Anesthesia Plan    ASA 4     general   (A line)  intravenous induction     Anesthetic plan, all risks, benefits, and alternatives have been provided, discussed and informed consent has been obtained with: patient.    Plan discussed with CRNA.

## 2020-12-11 NOTE — PROGRESS NOTES
At the conclusion of the surgery, the needle count was incorrect.  We were missing a 4-0 Nurolon suture.  We obtained a skull x-ray prior to closing, however due to the artifact present from the Florence, it was impossible to definitively clear the needle from being intracranial.  For this reason, I made the decision to go ahead and continue with the closure and remove the patient from the Florence head castillo.  We subsequently obtained a second skull radiograph in an attempt to see the Nurolon despite the fact that this was a departure from the normal sequence of events for a lost needle.

## 2020-12-12 ENCOUNTER — APPOINTMENT (OUTPATIENT)
Dept: CT IMAGING | Facility: HOSPITAL | Age: 80
End: 2020-12-12

## 2020-12-12 LAB
ABO GROUP BLD: NORMAL
ANION GAP SERPL CALCULATED.3IONS-SCNC: 8 MMOL/L (ref 5–15)
BASOPHILS # BLD AUTO: 0 10*3/MM3 (ref 0–0.2)
BASOPHILS NFR BLD AUTO: 0 % (ref 0–1.5)
BUN SERPL-MCNC: 16 MG/DL (ref 8–23)
BUN/CREAT SERPL: 25.4 (ref 7–25)
CALCIUM SPEC-SCNC: 9.2 MG/DL (ref 8.6–10.5)
CHLORIDE SERPL-SCNC: 102 MMOL/L (ref 98–107)
CO2 SERPL-SCNC: 24 MMOL/L (ref 22–29)
CREAT SERPL-MCNC: 0.63 MG/DL (ref 0.57–1)
DEPRECATED RDW RBC AUTO: 46 FL (ref 37–54)
EOSINOPHIL # BLD AUTO: 0.11 10*3/MM3 (ref 0–0.4)
EOSINOPHIL NFR BLD AUTO: 1.7 % (ref 0.3–6.2)
ERYTHROCYTE [DISTWIDTH] IN BLOOD BY AUTOMATED COUNT: 12.6 % (ref 12.3–15.4)
GFR SERPL CREATININE-BSD FRML MDRD: 91 ML/MIN/1.73
GLUCOSE BLDC GLUCOMTR-MCNC: 124 MG/DL (ref 70–130)
GLUCOSE BLDC GLUCOMTR-MCNC: 143 MG/DL (ref 70–130)
GLUCOSE BLDC GLUCOMTR-MCNC: 152 MG/DL (ref 70–130)
GLUCOSE SERPL-MCNC: 155 MG/DL (ref 65–99)
HBA1C MFR BLD: 6 % (ref 4.8–5.6)
HCT VFR BLD AUTO: 38.1 % (ref 34–46.6)
HGB BLD-MCNC: 12.1 G/DL (ref 12–15.9)
IMM GRANULOCYTES # BLD AUTO: 0.02 10*3/MM3 (ref 0–0.05)
IMM GRANULOCYTES NFR BLD AUTO: 0.3 % (ref 0–0.5)
LYMPHOCYTES # BLD AUTO: 0.35 10*3/MM3 (ref 0.7–3.1)
LYMPHOCYTES NFR BLD AUTO: 5.4 % (ref 19.6–45.3)
MCH RBC QN AUTO: 31.5 PG (ref 26.6–33)
MCHC RBC AUTO-ENTMCNC: 31.8 G/DL (ref 31.5–35.7)
MCV RBC AUTO: 99.2 FL (ref 79–97)
MONOCYTES # BLD AUTO: 0.3 10*3/MM3 (ref 0.1–0.9)
MONOCYTES NFR BLD AUTO: 4.6 % (ref 5–12)
NEUTROPHILS NFR BLD AUTO: 5.69 10*3/MM3 (ref 1.7–7)
NEUTROPHILS NFR BLD AUTO: 88 % (ref 42.7–76)
NRBC BLD AUTO-RTO: 0 /100 WBC (ref 0–0.2)
PLAT MORPH BLD: NORMAL
PLATELET # BLD AUTO: 292 10*3/MM3 (ref 140–450)
PMV BLD AUTO: 10 FL (ref 6–12)
POTASSIUM SERPL-SCNC: 4.7 MMOL/L (ref 3.5–5.2)
RBC # BLD AUTO: 3.84 10*6/MM3 (ref 3.77–5.28)
RBC MORPH BLD: NORMAL
RH BLD: POSITIVE
SODIUM SERPL-SCNC: 134 MMOL/L (ref 136–145)
WBC # BLD AUTO: 6.47 10*3/MM3 (ref 3.4–10.8)
WBC MORPH BLD: NORMAL

## 2020-12-12 PROCEDURE — 82962 GLUCOSE BLOOD TEST: CPT

## 2020-12-12 PROCEDURE — 85007 BL SMEAR W/DIFF WBC COUNT: CPT | Performed by: NEUROLOGICAL SURGERY

## 2020-12-12 PROCEDURE — 25010000002 DEXAMETHASONE SODIUM PHOSPHATE 100 MG/10ML SOLUTION: Performed by: NEUROLOGICAL SURGERY

## 2020-12-12 PROCEDURE — 86900 BLOOD TYPING SEROLOGIC ABO: CPT

## 2020-12-12 PROCEDURE — 86901 BLOOD TYPING SEROLOGIC RH(D): CPT

## 2020-12-12 PROCEDURE — 63710000001 INSULIN LISPRO (HUMAN) PER 5 UNITS: Performed by: NEUROLOGICAL SURGERY

## 2020-12-12 PROCEDURE — 83036 HEMOGLOBIN GLYCOSYLATED A1C: CPT | Performed by: NEUROLOGICAL SURGERY

## 2020-12-12 PROCEDURE — 85025 COMPLETE CBC W/AUTO DIFF WBC: CPT | Performed by: NEUROLOGICAL SURGERY

## 2020-12-12 PROCEDURE — 99232 SBSQ HOSP IP/OBS MODERATE 35: CPT | Performed by: INTERNAL MEDICINE

## 2020-12-12 PROCEDURE — 70450 CT HEAD/BRAIN W/O DYE: CPT

## 2020-12-12 PROCEDURE — 80048 BASIC METABOLIC PNL TOTAL CA: CPT | Performed by: NEUROLOGICAL SURGERY

## 2020-12-12 PROCEDURE — 25010000002 DEXAMETHASONE PER 1 MG: Performed by: NEUROLOGICAL SURGERY

## 2020-12-12 PROCEDURE — 25010000002 CEFEPIME PER 500 MG: Performed by: NEUROLOGICAL SURGERY

## 2020-12-12 RX ORDER — CHOLECALCIFEROL (VITAMIN D3) 125 MCG
5 CAPSULE ORAL NIGHTLY PRN
Status: DISCONTINUED | OUTPATIENT
Start: 2020-12-12 | End: 2020-12-15 | Stop reason: HOSPADM

## 2020-12-12 RX ORDER — DEXTROSE MONOHYDRATE 25 G/50ML
25 INJECTION, SOLUTION INTRAVENOUS
Status: DISCONTINUED | OUTPATIENT
Start: 2020-12-12 | End: 2020-12-15 | Stop reason: HOSPADM

## 2020-12-12 RX ORDER — DEXAMETHASONE SODIUM PHOSPHATE 4 MG/ML
4 INJECTION, SOLUTION INTRA-ARTICULAR; INTRALESIONAL; INTRAMUSCULAR; INTRAVENOUS; SOFT TISSUE EVERY 6 HOURS
Status: DISCONTINUED | OUTPATIENT
Start: 2020-12-12 | End: 2020-12-13

## 2020-12-12 RX ORDER — NICOTINE POLACRILEX 4 MG
15 LOZENGE BUCCAL
Status: DISCONTINUED | OUTPATIENT
Start: 2020-12-12 | End: 2020-12-15 | Stop reason: HOSPADM

## 2020-12-12 RX ADMIN — LOSARTAN POTASSIUM 50 MG: 50 TABLET, FILM COATED ORAL at 08:25

## 2020-12-12 RX ADMIN — HYDROCODONE BITARTRATE AND ACETAMINOPHEN 1 TABLET: 5; 325 TABLET ORAL at 15:57

## 2020-12-12 RX ADMIN — NICARDIPINE HYDROCHLORIDE 10 MG/HR: 0.1 INJECTION, SOLUTION INTRAVENOUS at 13:43

## 2020-12-12 RX ADMIN — DOCUSATE SODIUM 100 MG: 100 CAPSULE ORAL at 20:30

## 2020-12-12 RX ADMIN — DEXAMETHASONE SODIUM PHOSPHATE 4 MG: 4 INJECTION, SOLUTION INTRA-ARTICULAR; INTRALESIONAL; INTRAMUSCULAR; INTRAVENOUS; SOFT TISSUE at 15:39

## 2020-12-12 RX ADMIN — NICARDIPINE HYDROCHLORIDE 5 MG/HR: 0.1 INJECTION, SOLUTION INTRAVENOUS at 06:06

## 2020-12-12 RX ADMIN — LEVOTHYROXINE SODIUM 75 MCG: 75 TABLET ORAL at 06:06

## 2020-12-12 RX ADMIN — DEXAMETHASONE SODIUM PHOSPHATE 10 MG: 10 INJECTION, SOLUTION INTRAMUSCULAR; INTRAVENOUS at 10:26

## 2020-12-12 RX ADMIN — DEXAMETHASONE SODIUM PHOSPHATE 4 MG: 4 INJECTION, SOLUTION INTRA-ARTICULAR; INTRALESIONAL; INTRAMUSCULAR; INTRAVENOUS; SOFT TISSUE at 22:18

## 2020-12-12 RX ADMIN — SODIUM CHLORIDE, PRESERVATIVE FREE 10 ML: 5 INJECTION INTRAVENOUS at 10:29

## 2020-12-12 RX ADMIN — NICARDIPINE HYDROCHLORIDE 15 MG/HR: 2.5 INJECTION, SOLUTION INTRAVENOUS at 17:43

## 2020-12-12 RX ADMIN — INSULIN LISPRO 2 UNITS: 100 INJECTION, SOLUTION INTRAVENOUS; SUBCUTANEOUS at 11:54

## 2020-12-12 RX ADMIN — Medication 5 MG: at 22:27

## 2020-12-12 RX ADMIN — NICARDIPINE HYDROCHLORIDE 12.5 MG/HR: 0.1 INJECTION, SOLUTION INTRAVENOUS at 15:39

## 2020-12-12 RX ADMIN — HYDROCODONE BITARTRATE AND ACETAMINOPHEN 1 TABLET: 5; 325 TABLET ORAL at 04:28

## 2020-12-12 RX ADMIN — BENZOCAINE AND MENTHOL 1 LOZENGE: 15; 3.6 LOZENGE ORAL at 19:26

## 2020-12-12 RX ADMIN — SODIUM CHLORIDE, PRESERVATIVE FREE 10 ML: 5 INJECTION INTRAVENOUS at 20:15

## 2020-12-12 RX ADMIN — NICARDIPINE HYDROCHLORIDE 15 MG/HR: 2.5 INJECTION, SOLUTION INTRAVENOUS at 21:41

## 2020-12-12 RX ADMIN — CEFEPIME HYDROCHLORIDE 2 G: 2 INJECTION, POWDER, FOR SOLUTION INTRAVENOUS at 06:06

## 2020-12-12 NOTE — PLAN OF CARE
Goal Outcome Evaluation:  Plan of Care Reviewed With: patient  Progress: improving  Outcome Summary: Pt. remained neurologically intact during the night. Pain controlled with PRN medication. HOB remained flat per order. Subdural drain with no output until 0430, and immediately dumped 65 mL of blood, during this time pt complained of HA but subsided with administration of PRN norco. Since then drainage has slowed down and has since only produced 25 mL of blood tinged drainage. Cardene off majority of shift, but restarted around 0500 to keep SBP<140. Dressing C/D/I. Will continue to monitor

## 2020-12-12 NOTE — PROGRESS NOTES
Intensive Care Follow-up     Hospital:  LOS: 2 days   Ms. Gloria Willams, 80 y.o. 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  She had a fall approximately one month prior and had an ongoing headache and PCP ordered an MRI which showed a left frontal SDH. She was seen in the office by neurosurgery on 12/1/2020 and deemed non-surgical at that time. She was set up for one month follow up but instructed to go to ED if symptoms worsened or new ones developed. MRI again showed the large left sided SDH with 5.5 mm midline shift to the right.       Subjective   Interval History:  Patient doing very well this morning.  No acute issues.  Denies chest pain, shortness of breath, fever, or chills.  Only question this morning with what time is [employer] basketball team play.         The patient's past medical, surgical and social history were reviewed and updated in Epic as appropriate.       Objective     Infusions:  niCARdipine, 5-15 mg/hr, Last Rate: 7.5 mg/hr (12/12/20 1149)  phenylephrine, 0.5-3 mcg/kg/min, Last Rate: Stopped (12/11/20 1440)      Medications:  dexamethasone, 4 mg, Intravenous, Q6H  insulin lispro, 0-7 Units, Subcutaneous, TID AC  insulin lispro protamine-insulin lispro, 10 Units, Subcutaneous, BID With Meals  levothyroxine, 75 mcg, Oral, Q AM  losartan, 50 mg, Oral, Daily  sodium chloride, 10 mL, Intravenous, Q12H      I reviewed the patient's medications.    Vital Sign Min/Max for last 24 hours  Temp  Min: 96.4 °F (35.8 °C)  Max: 98.7 °F (37.1 °C)   BP  Min: 106/84  Max: 160/58   Pulse  Min: 68  Max: 103   Resp  Min: 15  Max: 20    SpO2  Min: 90 %  Max: 99 %   Flow (L/min)  Min: 2  Max: 3       Input/Output for last 24 hour shift  12/11 0701 - 12/12 0700  In: 1599.9 [P.O.:240; I.V.:1159.9]  Out: 740 [Urine:650; Drains:90]      GENERAL : NAD, conversant  RESPIRATORY/THORAX : normal respiratory effort and no intercostal retractions, CTAB  CARDIOVASCULAR : Normal S1/S2, RRR.  No lower ext edema.  GASTROINTESTINAL : Soft, NT/ND. BS x 4 normoactive. No hepatosplenomegaly.  MUSCULOSKELETAL : No cyanosis, clubbing, or ischemia  NEUROLOGICAL: alert and oriented to person, place and time  PSYCHOLOGICAL : Appropriate affect      Results from last 7 days   Lab Units 12/12/20  0445 12/11/20  1814 12/10/20  1759   WBC 10*3/mm3 6.47 6.10 4.20   HEMOGLOBIN g/dL 12.1 12.4 11.7*   PLATELETS 10*3/mm3 292 314 294     Results from last 7 days   Lab Units 12/12/20  0610 12/11/20  1814 12/10/20  1759   SODIUM mmol/L 134* 136 136   POTASSIUM mmol/L 4.7 4.0 4.4   CO2 mmol/L 24.0 22.0 24.0   BUN mg/dL 16 13 19   CREATININE mg/dL 0.63 0.76 0.82   GLUCOSE mg/dL 155* 147* 92     Estimated Creatinine Clearance: 54.1 mL/min (by C-G formula based on SCr of 0.63 mg/dL).          I reviewed the patient's new clinical results.  I reviewed the patient's new imaging results/reports including actual images and agree with reports.              Imaging Results (Last 24 Hours)     Procedure Component Value Units Date/Time    CT Head Without Contrast [[id number]] Collected: 12/12/20 0941     Updated: 12/12/20 0949    Narrative:      EXAMINATION: CT HEAD WO CONTRAST-      INDICATION: sdh; S06.0Z4L-Ofcafqtnh subdural hemorrhage with loss of  consciousness of unspecified duration, initial encounter; R27.0-Ataxia,  unspecified     TECHNIQUE: Multiplanar CT imaging of the head was performed without  administration of intravenous contrast.     The radiation dose reduction device was turned on for each scan per the  ALARA (As Low as Reasonably Achievable) protocol.     COMPARISON: CT head  dated 12/11/2020     FINDINGS: No midline shift. Ventricles are normal in size. Left  frontoparietal craniotomy with surgical drain in place. There is  redemonstration of a extra-axial fluid collection with scattered areas  of hyperdensity, with some high density seen tracking within the sylvian  fissure.. This measures up to 1.5 cm in depth, significantly improved  from prior exam. Minimal rightward midline shift which is unchanged from  prior exam. Resolving emphysema within the extra-axial fluid collection.  Coil at the right cerebellopontine angle. No new acute findings.             Impression:      Demonstration of resolving postoperative changes of left  craniotomy. There is resolving emphysema and decreasing size of the  extra-axial fluid collection. Scattered areas of hyperdensity throughout  the residual fluid collection are redemonstrated and appears slightly  more confluent. These may represent age-indeterminate blood products.        This report was finalized on 12/12/2020 9:46 AM by Tao Ureña.       CT Head Without Contrast [[id number]] Collected: 12/11/20 1851     Updated: 12/11/20 1900    Narrative:      EXAMINATION: CT HEAD WO CONTRAST-      INDICATION: Post Op Craniotomoy Evaluation; S06.2D7V-Rfczfqwlt subdural  hemorrhage with loss of consciousness of unspecified duration, initial  encounter; R27.0-Ataxia, unspecified     TECHNIQUE: Multiplanar CT imaging the head was performed without  administration of intravenous contrast.     The radiation dose reduction device was turned on for each scan per the  ALARA (As Low as Reasonably Achievable) protocol.     COMPARISON: CT head dated 12/10/2020     FINDINGS: Approximately 2 mm of rightward midline shift. Ventricles are  within normal limits. Basilar cisterns are patent. Postoperative changes  of left frontoparietal craniotomy with extra-axial fluid collection  measuring up to 2.1 cm in thickness. Within this fluid collection are  several scattered  areas of high density material and new surgical  drains. Layering emphysema is present. A coil is seen at the right  cerebellopontine angle. Gray-white differentiation appears overall  preserved. Prior bilateral lens surgery. No mastoid air cell effusions.  Paranasal sinuses are clear.             Impression:      Postsurgical changes of left frontoparietal craniotomy with  subdural evacuation and drain placement. Expected postsurgical emphysema  is present. There is some persistent left extra-axial fluid and high  density material measuring up to 2 cm in thickness with minimal  rightward midline shift, overall slightly improved compared to prior.        This report was finalized on 12/11/2020 6:57 PM by Tao Ureña.       CT Head Without Contrast [[id number]] Collected: 12/10/20 1653     Updated: 12/11/20 1530    Narrative:      EXAMINATION: CT HEAD WO CONTRAST 12/05/2020     INDICATION: Disoriented, difficult finding words.     TECHNIQUE: CT head without intravenous contrast.     The radiation dose reduction device was turned on for each scan per the  ALARA (As Low as Reasonably Achievable) protocol.     COMPARISON: CT head dated 11/30/2020.     FINDINGS: Heterogeneous extra-axial collection left frontal convexity  measuring up to 2.7 cm increase in size from prior comparison of  11/30/2020 where this was 2.2 along with increased at least moderate  hyperattenuation throughout of likely acute on chronic subdural hematoma  with increased mass effect and effacement left lateral ventricle with  rightward midline shift now 7 mm increased from prior comparison where  this was 4 mm. No intraventricular hemorrhage extension. Globes and  orbits unremarkable. Paranasal sinuses and mastoid air cells are grossly  clear and well pneumatized. Calvarium intact. Status post embolization  with coiling material in the right vertebral region posterior fossa as  seen on prior.       Impression:      Increased thickness up to 2.7 cm  left extra-axial fluid  collection along the left frontoparietal convexity extending towards the  vertex with heterogeneous signal including hyperattenuation of acute on  chronic subdural hematoma with increased effacement left lateral  ventricle and increased rightward midline shift at the level of the  septum pellucidum now 7 mm. No intraventricular hemorrhage extension or  hydrocephalus.     DICTATED:   12/10/2020  EDITED/ls :   12/10/2020      This report was finalized on 12/11/2020 3:27 PM by Dr. Pablo Phoenix.       XR Lost Needle / Instrument [[id number]] Collected: 12/11/20 1449     Updated: 12/11/20 1510    Addenda:        Addendum: Patient was removed from and fixation and subsequent  radiograph was obtained. There is no evidence of retained surgical  needle. Craniotomy changes are present.     Further findings were discussed with the operating room staff by David Escobedo at 2:59 PM same day     This report was finalized on 12/11/2020 3:02 PM by David Escobedo.     Signed: 12/11/20 1502 by David Escobedo MD    Narrative:      EXAMINATION: XR LOST NEEDLE/INSTRUMENT-      INDICATION: Lost needle; S06.2W8V-Sledgyhts subdural hemorrhage with  loss of consciousness of unspecified duration, initial encounter;  R27.0-Ataxia, unspecified      COMPARISON: NONE     FINDINGS: Intraoperative radiograph with patient in Reed head  fixation, largely obscuring the head. A single craniotomy plate is  noted. There is otherwise no evidence of retained metallic surgical  instrument.       Impression:      Intraoperative radiograph with patient in Reed head  fixation, largely obscuring the head. A single craniotomy plate is  noted. There is otherwise no evidence of retained metallic surgical  instrument.     Findings discussed with the operating room staff by David Escobedo at  2:48 PM 12/11/2020     This report was finalized on 12/11/2020 2:50 PM by David Escobedo.             Assessment/Plan    Impression        Subdural hematoma (CMS/HCC)    Hypothyroid    Hypertension       Plan        80-year-old female with a past medical history significant for hypothyroidism hypertension.  Who presented to the ICU on 12/12/2020 with a worsening subdural hematoma on underwent a left-sided craniotomy for subdural hematoma evacuation monitor in the ICU postoperatively.    · Postcraniotomy management per neurosurgery  · Dexamethasone per neurosurgery  · Continue blood pressure control  · Continue home levothyroxine  · Mobility guidelines per Neurosurgery  · SCDs for DVT prophylaxis    Plan of care and goals reviewed with mulitdisciplinary/antibiotic stewardship team during rounds.   I discussed the patient's findings and my recommendations with patient and nursing staff       Rico Grace,   Pulmonary, Critical care and Sleep Medicine

## 2020-12-12 NOTE — PROGRESS NOTES
"           FOLLOW UP ENCOUNTER          CHIEF COMPLAINT::   POD 1: Craniotomy for subacute subdural hematoma, right                        MEDICAL HISTORY SINCE LAST ENCOUNTER :     She is alert and oriented with no overt deficit.  Denies headache.  She has had minimal drainage until earlier this morning when the drain \"open\".  Therefore would leave an extra day and to continue recumbency with Trendelenburg.    Short course of dexamethasone may be helpful to hasten the resolution.                                    ASSESSMENT/DISPOSITION :       1.  Check CT scan in a.m.    2.  Continue Trendelenburg with anticipation of more drainage.    3.  Short course of dexamethasone.        "

## 2020-12-12 NOTE — PLAN OF CARE
Outcome Summary: Pt. neurologically intact. CT head complete. Decadron and insulin ordered per Dr. Arellano. 2L NC. Cardene titrated to keep -140. UOP adequate. Afebrile. HOB flat and trendelenburg to help aid in draining from SD drain, per Dr. Arellano. Okay to elevate HOB for meals, per Dr. Arellano. 19mL of output from SD drain thus far. Will continue to monitor.

## 2020-12-12 NOTE — CONSULTS
Adult Nutrition  Assessment/PES    Patient Name:  Gloria Willams  YOB: 1940  MRN: [id number]  Admit Date:  12/10/2020    Assessment Date:  12/12/2020    Comments:  Met w/ pt and provided education diet while on dexamethasone and need for BG control. Encouraged her to avoid her favorite food group-desserts. Diet changed to Consistent CHO w/ fresh fruit w/meals in lieu of desserts. RD will follow.    Reason for Assessment     Row Name 12/12/20 1341          Reason for Assessment    Reason For Assessment  physician consult consult education; 30 mins     Diagnosis  neurologic conditions     Identified At Risk by Screening Criteria  need for education         Nutrition/Diet History     Row Name 12/12/20 1343          Nutrition/Diet History    Typical Food/Fluid Intake  RN reports pt started on short course of dexamethasone, concern that she is getting insulin; pt reports her appetite is good     Food Preferences  sweets, desserts           Labs/Tests/Procedures/Meds     Row Name 12/12/20 1345          Labs/Procedures/Meds    Lab Results Reviewed  reviewed, pertinent     Lab Results Comments  Hgb A1 C= 6.0        Medications    Pertinent Medications Reviewed  reviewed, pertinent     Pertinent Medications Comments  dexamethasone, insulin,cardene         Physical Findings     Row Name 12/12/20 1346          Physical Findings    Overall Physical Appearance  obese     Tubes  EVD     Skin  surgical incision         Estimated/Assessed Needs     Row Name 12/12/20 1347          Calculation Measurements    Weight Used For Calculations  80.2 kg (176 lb 12.9 oz)        Estimated/Assessed Needs    Additional Documentation  KCAL/KG (Group);Protein Requirements (Group)        KCAL/KG    KCAL/KG  20 Kcal/Kg (kcal)     20 Kcal/Kg (kcal)  1604        Protein Requirements    Weight Used For Protein Calculations  80.2 kg (176 lb 12.9 oz)     Est Protein Requirement Amount (gms/kg)  1.5 gm protein     Estimated Protein  Requirements (gms/day)  120.3         Nutrition Prescription Ordered     Row Name 12/12/20 1347          Nutrition Prescription PO    Current PO Diet  Regular                 Problem/Interventions:  Problem 1     Row Name 12/12/20 1347          Nutrition Diagnoses Problem 1    Problem 1  Needs Alternate Composition     Macronutrient  Carbohydrate;Kcal     Etiology (related to)  Medication Interaction     Food/Medication Interaction  Steroids     Signs/Symptoms (evidenced by)  Reported  Information Deficit     Resolved?  Yes education provided               Intervention Goal     Row Name 12/12/20 1349          Intervention Goal    General  Provide information regarding MNT for treatment/condition     PO  Maintain intake         Nutrition Intervention     Row Name 12/12/20 1350          Nutrition Intervention    RD/Tech Action  Follow Tx progress;Care plan reviewd;Recommend/ordered     Recommended/Ordered  Diet         Nutrition Prescription     Row Name 12/12/20 1350          Nutrition Prescription PO    PO Prescription  Begin/change diet     Common Modifiers  Consistent Carbohydrate     New PO Prescription Ordered?  Yes         Education/Evaluation     Row Name 12/12/20 1351          Education    Education  Provided education regarding;Education topics     Provided education regarding  Diet rationale     Education Topics  Other (comment) Mateo / no concentrated for sweets while on dexamethasone        Monitor/Evaluation    Monitor  Per protocol;Pertinent labs           Electronically signed by:  Evie Santos MS,RD,LD  12/12/20 13:54 EST

## 2020-12-12 NOTE — PLAN OF CARE
Outcome Summary: Patient arrived to ICU around 1745. A&OX4, ARANA, follows commands. Dressing C/D/I. Subdural drain in place and hooked up and leveled to 0cmH20. Postop CT head complete. 2L NC. Cardene titrated to keep -140. Afebrile. Norco and dilaudid prn for pain. Will continue to monitor.

## 2020-12-13 ENCOUNTER — APPOINTMENT (OUTPATIENT)
Dept: CT IMAGING | Facility: HOSPITAL | Age: 80
End: 2020-12-13

## 2020-12-13 LAB
ANION GAP SERPL CALCULATED.3IONS-SCNC: 7 MMOL/L (ref 5–15)
BASOPHILS # BLD AUTO: 0.01 10*3/MM3 (ref 0–0.2)
BASOPHILS NFR BLD AUTO: 0.1 % (ref 0–1.5)
BUN SERPL-MCNC: 20 MG/DL (ref 8–23)
BUN/CREAT SERPL: 34.5 (ref 7–25)
CALCIUM SPEC-SCNC: 9.4 MG/DL (ref 8.6–10.5)
CHLORIDE SERPL-SCNC: 107 MMOL/L (ref 98–107)
CO2 SERPL-SCNC: 22 MMOL/L (ref 22–29)
CREAT SERPL-MCNC: 0.58 MG/DL (ref 0.57–1)
DEPRECATED RDW RBC AUTO: 49.4 FL (ref 37–54)
EOSINOPHIL # BLD AUTO: 0 10*3/MM3 (ref 0–0.4)
EOSINOPHIL NFR BLD AUTO: 0 % (ref 0.3–6.2)
ERYTHROCYTE [DISTWIDTH] IN BLOOD BY AUTOMATED COUNT: 12.8 % (ref 12.3–15.4)
GFR SERPL CREATININE-BSD FRML MDRD: 100 ML/MIN/1.73
GLUCOSE BLDC GLUCOMTR-MCNC: 110 MG/DL (ref 70–130)
GLUCOSE BLDC GLUCOMTR-MCNC: 121 MG/DL (ref 70–130)
GLUCOSE BLDC GLUCOMTR-MCNC: 134 MG/DL (ref 70–130)
GLUCOSE BLDC GLUCOMTR-MCNC: 138 MG/DL (ref 70–130)
GLUCOSE SERPL-MCNC: 139 MG/DL (ref 65–99)
HCT VFR BLD AUTO: 40.7 % (ref 34–46.6)
HGB BLD-MCNC: 12 G/DL (ref 12–15.9)
IMM GRANULOCYTES # BLD AUTO: 0.07 10*3/MM3 (ref 0–0.05)
IMM GRANULOCYTES NFR BLD AUTO: 0.6 % (ref 0–0.5)
LYMPHOCYTES # BLD AUTO: 0.34 10*3/MM3 (ref 0.7–3.1)
LYMPHOCYTES NFR BLD AUTO: 3.2 % (ref 19.6–45.3)
MAGNESIUM SERPL-MCNC: 2.1 MG/DL (ref 1.6–2.4)
MCH RBC QN AUTO: 31.1 PG (ref 26.6–33)
MCHC RBC AUTO-ENTMCNC: 29.5 G/DL (ref 31.5–35.7)
MCV RBC AUTO: 105.4 FL (ref 79–97)
MONOCYTES # BLD AUTO: 0.31 10*3/MM3 (ref 0.1–0.9)
MONOCYTES NFR BLD AUTO: 2.9 % (ref 5–12)
NEUTROPHILS NFR BLD AUTO: 10.04 10*3/MM3 (ref 1.7–7)
NEUTROPHILS NFR BLD AUTO: 93.2 % (ref 42.7–76)
NRBC BLD AUTO-RTO: 0 /100 WBC (ref 0–0.2)
PHOSPHATE SERPL-MCNC: 3.4 MG/DL (ref 2.5–4.5)
PLAT MORPH BLD: NORMAL
PLATELET # BLD AUTO: 291 10*3/MM3 (ref 140–450)
PMV BLD AUTO: 9.8 FL (ref 6–12)
POTASSIUM SERPL-SCNC: 4.4 MMOL/L (ref 3.5–5.2)
RBC # BLD AUTO: 3.86 10*6/MM3 (ref 3.77–5.28)
RBC MORPH BLD: NORMAL
SODIUM SERPL-SCNC: 136 MMOL/L (ref 136–145)
WBC # BLD AUTO: 10.77 10*3/MM3 (ref 3.4–10.8)
WBC MORPH BLD: NORMAL

## 2020-12-13 PROCEDURE — 83735 ASSAY OF MAGNESIUM: CPT | Performed by: INTERNAL MEDICINE

## 2020-12-13 PROCEDURE — 82962 GLUCOSE BLOOD TEST: CPT

## 2020-12-13 PROCEDURE — 85025 COMPLETE CBC W/AUTO DIFF WBC: CPT | Performed by: INTERNAL MEDICINE

## 2020-12-13 PROCEDURE — 84100 ASSAY OF PHOSPHORUS: CPT | Performed by: INTERNAL MEDICINE

## 2020-12-13 PROCEDURE — 99232 SBSQ HOSP IP/OBS MODERATE 35: CPT | Performed by: INTERNAL MEDICINE

## 2020-12-13 PROCEDURE — 70450 CT HEAD/BRAIN W/O DYE: CPT

## 2020-12-13 PROCEDURE — 25010000002 DEXAMETHASONE PER 1 MG: Performed by: NEUROLOGICAL SURGERY

## 2020-12-13 PROCEDURE — 80048 BASIC METABOLIC PNL TOTAL CA: CPT | Performed by: INTERNAL MEDICINE

## 2020-12-13 PROCEDURE — 85007 BL SMEAR W/DIFF WBC COUNT: CPT | Performed by: INTERNAL MEDICINE

## 2020-12-13 RX ORDER — DEXAMETHASONE SODIUM PHOSPHATE 4 MG/ML
2 INJECTION, SOLUTION INTRA-ARTICULAR; INTRALESIONAL; INTRAMUSCULAR; INTRAVENOUS; SOFT TISSUE EVERY 6 HOURS
Status: DISCONTINUED | OUTPATIENT
Start: 2020-12-13 | End: 2020-12-15 | Stop reason: HOSPADM

## 2020-12-13 RX ADMIN — DEXAMETHASONE SODIUM PHOSPHATE 2 MG: 4 INJECTION, SOLUTION INTRA-ARTICULAR; INTRALESIONAL; INTRAMUSCULAR; INTRAVENOUS; SOFT TISSUE at 16:06

## 2020-12-13 RX ADMIN — DEXAMETHASONE SODIUM PHOSPHATE 2 MG: 4 INJECTION, SOLUTION INTRA-ARTICULAR; INTRALESIONAL; INTRAMUSCULAR; INTRAVENOUS; SOFT TISSUE at 09:56

## 2020-12-13 RX ADMIN — LEVOTHYROXINE SODIUM 75 MCG: 75 TABLET ORAL at 06:52

## 2020-12-13 RX ADMIN — DEXAMETHASONE SODIUM PHOSPHATE 2 MG: 4 INJECTION, SOLUTION INTRA-ARTICULAR; INTRALESIONAL; INTRAMUSCULAR; INTRAVENOUS; SOFT TISSUE at 22:02

## 2020-12-13 RX ADMIN — HYDROCODONE BITARTRATE AND ACETAMINOPHEN 1 TABLET: 5; 325 TABLET ORAL at 04:30

## 2020-12-13 RX ADMIN — DEXAMETHASONE SODIUM PHOSPHATE 4 MG: 4 INJECTION, SOLUTION INTRA-ARTICULAR; INTRALESIONAL; INTRAMUSCULAR; INTRAVENOUS; SOFT TISSUE at 04:24

## 2020-12-13 RX ADMIN — SODIUM CHLORIDE, PRESERVATIVE FREE 10 ML: 5 INJECTION INTRAVENOUS at 08:34

## 2020-12-13 RX ADMIN — Medication 5 MG: at 22:59

## 2020-12-13 RX ADMIN — NICARDIPINE HYDROCHLORIDE 7.5 MG/HR: 2.5 INJECTION, SOLUTION INTRAVENOUS at 18:47

## 2020-12-13 RX ADMIN — HYDROCODONE BITARTRATE AND ACETAMINOPHEN 1 TABLET: 5; 325 TABLET ORAL at 22:59

## 2020-12-13 RX ADMIN — LOSARTAN POTASSIUM 50 MG: 50 TABLET, FILM COATED ORAL at 08:26

## 2020-12-13 NOTE — PROGRESS NOTES
FOLLOW UP ENCOUNTER          CHIEF COMPLAINT::   POD #2: Evacuation of subacute subdural hematoma, left                        MEDICAL HISTORY SINCE LAST ENCOUNTER :   [She remains alert, oriented with minimal headache.  The amount of drainage has decreased.    I/O last 3 completed shifts:  In: 3041 [P.O.:725; I.V.:2016.6; IV Piggyback:299.4]  Out: 2360 [Urine:2225; Drains:135]    Intake/Output Summary (Last 24 hours) at 12/13/2020 0757  Last data filed at 12/13/2020 0600  Gross per 24 hour   Intake 2201.97 ml   Output 1645 ml   Net 556.97 ml   ]                                    ASSESSMENT/DISPOSITION :      1.  We will continue Trendelenburg today with positioning to promote additional drainage; remove drain in a.m. and increase activities.  I would expect for this to continue to resolve.    2.  Begin a wean of dexamethasone.    3.  Check CT scan in a.m.

## 2020-12-13 NOTE — PROGRESS NOTES
Intensive Care Follow-up     Hospital:  LOS: 3 days   Ms. Gloria Willams, 80 y.o. 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  She had a fall approximately one month prior and had an ongoing headache and PCP ordered an MRI which showed a left frontal SDH. She was seen in the office by neurosurgery on 12/1/2020 and deemed non-surgical at that time. She was set up for one month follow up but instructed to go to ED if symptoms worsened or new ones developed. MRI again showed the large left sided SDH with 5.5 mm midline shift to the right.       Subjective   Interval History:  Patient doing very well this morning.  Does have a slight headache.  No acute issues nausea is well controlled.  She denies any chest pain, shortness breath, fever, or chills.  Neurosurgery would like to keep her in Trendelenburg for the next 24 hours.           The patient's past medical, surgical and social history were reviewed and updated in Epic as appropriate.       Objective     Infusions:  niCARdipine, 5-15 mg/hr, Last Rate: Stopped (12/13/20 0108)  phenylephrine, 0.5-3 mcg/kg/min, Last Rate: Stopped (12/11/20 1440)      Medications:  dexamethasone, 2 mg, Intravenous, Q6H  insulin lispro, 0-7 Units, Subcutaneous, TID AC  insulin lispro protamine-insulin lispro, 10 Units, Subcutaneous, BID With Meals  levothyroxine, 75 mcg, Oral, Q AM  losartan, 50 mg, Oral, Daily  sodium chloride, 10 mL, Intravenous, Q12H      I reviewed the patient's medications.    Vital Sign Min/Max for last 24 hours  Temp  Min: 96.5 °F (35.8 °C)  Max: 98.1 °F (36.7 °C)   BP  Min: 94/67   Max: 161/67   Pulse  Min: 67  Max: 102   Resp  Min: 14  Max: 20   SpO2  Min: 89 %  Max: 98 %   Flow (L/min)  Min: 1  Max: 3       Input/Output for last 24 hour shift  12/12 0701 - 12/13 0700  In: 2202 [P.O.:485; I.V.:1617.6]  Out: 1870 [Urine:1825; Drains:45]      GENERAL : NAD, conversant  RESPIRATORY/THORAX : normal respiratory effort and no intercostal retractions, CTAB  CARDIOVASCULAR : Normal S1/S2, RRR.  No lower ext edema.  GASTROINTESTINAL : Soft, NT/ND. BS x 4 normoactive. No hepatosplenomegaly.  MUSCULOSKELETAL : No cyanosis, clubbing, or ischemia  NEUROLOGICAL: alert and oriented to person, place and time  PSYCHOLOGICAL : Appropriate affect    Results from last 7 days   Lab Units 12/13/20  0548 12/12/20  0445 12/11/20  1814   WBC 10*3/mm3 10.77 6.47 6.10   HEMOGLOBIN g/dL 12.0 12.1 12.4   PLATELETS 10*3/mm3 291 292 314     Results from last 7 days   Lab Units 12/13/20  0548 12/12/20  0610 12/11/20  1814   SODIUM mmol/L 136 134* 136   POTASSIUM mmol/L 4.4 4.7 4.0   CO2 mmol/L 22.0 24.0 22.0   BUN mg/dL 20 16 13   CREATININE mg/dL 0.58 0.63 0.76   MAGNESIUM mg/dL 2.1  --   --    PHOSPHORUS mg/dL 3.4  --   --    GLUCOSE mg/dL 139* 155* 147*     Estimated Creatinine Clearance: 53.5 mL/min (by C-G formula based on SCr of 0.58 mg/dL).          I reviewed the patient's new clinical results.  I reviewed the patient's new imaging results/reports including actual images and agree with reports.       Imaging Results (Last 24 Hours)     Procedure Component Value Units Date/Time    CT Head Without Contrast [[id number]] Collected: 12/13/20 0751     Updated: 12/13/20 0756    Narrative:      EXAMINATION: CT HEAD WO CONTRAST-      INDICATION: Subdural hematoma; S06.3I8I-Oafjtqavl subdural hemorrhage  with loss of consciousness of unspecified duration, initial encounter;  R27.0-Ataxia, unspecified     TECHNIQUE: Multiplanar CT imaging of the head was performed without  administration of intravenous contrast.     The  radiation dose reduction device was turned on for each scan per the  ALARA (As Low as Reasonably Achievable) protocol.     COMPARISON: CT head performed yesterday.     FINDINGS: Continued resolution of postoperative changes with decreasing  left extra-axial fluid collection and emphysema with drain in place.  High density material within the fluid collection is redemonstrated and  appears overall similar. No midline shift. Ventricles are normal in  size. Metallic objects seen at the right cerebellopontine angle. Prior  bilateral globe surgery. Left frontoparietal craniotomy.             Impression:      Continued resolution of left extra-axial fluid collection  with persistent emphysema and high density material.        This report was finalized on 12/13/2020 7:53 AM by Tao Ureña.             Assessment/Plan   Impression        Subdural hematoma (CMS/HCC)    Hypothyroid    Hypertension       Plan        80-year-old female with a past medical history significant for hypothyroidism hypertension.  Who presented to the ICU on 12/12/2020 with a worsening subdural hematoma on underwent a left-sided craniotomy for subdural hematoma evacuation monitor in the ICU postoperatively.     · Postcraniotomy management per neurosurgery  · Dexamethasone per neurosurgery  · Continue blood pressure control  · Continue home levothyroxine  · Mobility guidelines per Neurosurgery  · SCDs for DVT prophylaxis    Plan of care and goals reviewed with mulitdisciplinary/antibiotic stewardship team during rounds.   I discussed the patient's findings and my recommendations with patient and nursing staff     Rico Grace DO  Pulmonary, Critical care and Sleep Medicine

## 2020-12-13 NOTE — PLAN OF CARE
Goal Outcome Evaluation:  Plan of Care Reviewed With: patient  Progress: improving  Outcome Summary: Pt neurologically intact. A&OX4, ARANA, LUE slightly weaker at times due to arthritis. Word finding at times. CT head in the am, plan to remove subdural drain in the am per Dr. Arellano. Room air to 1-2L NC. Cardene restarted due to SBP>140. UOP adequate. Afebrile. 16mL blood tinged output from subdural drain. Pt. denies pain. Will continue to monitor.

## 2020-12-13 NOTE — PLAN OF CARE
Goal Outcome Evaluation:  Plan of Care Reviewed With: patient  Progress: improving  Outcome Summary: Patient neurologically intact. Cardene gtt at this time is currently off. SBP between 110s and 140s. UOP adequate. So far the drain output has been 20 mL. VSS. Bed is in trendeleberg. CT to be done this morning. PRN norco given this morning. PAtient requested something to help her sleep last night, APRN ordered PRN melatonin. PAtient at times will be slightly confuised when woken up but reorientes self or reoriented easily.

## 2020-12-14 ENCOUNTER — APPOINTMENT (OUTPATIENT)
Dept: CT IMAGING | Facility: HOSPITAL | Age: 80
End: 2020-12-14

## 2020-12-14 LAB
ANION GAP SERPL CALCULATED.3IONS-SCNC: 7 MMOL/L (ref 5–15)
BASOPHILS # BLD AUTO: 0 10*3/MM3 (ref 0–0.2)
BASOPHILS NFR BLD AUTO: 0 % (ref 0–1.5)
BUN SERPL-MCNC: 20 MG/DL (ref 8–23)
BUN/CREAT SERPL: 38.5 (ref 7–25)
CALCIUM SPEC-SCNC: 9.1 MG/DL (ref 8.6–10.5)
CHLORIDE SERPL-SCNC: 105 MMOL/L (ref 98–107)
CO2 SERPL-SCNC: 23 MMOL/L (ref 22–29)
CREAT SERPL-MCNC: 0.52 MG/DL (ref 0.57–1)
DEPRECATED RDW RBC AUTO: 46.2 FL (ref 37–54)
EOSINOPHIL # BLD AUTO: 0 10*3/MM3 (ref 0–0.4)
EOSINOPHIL NFR BLD AUTO: 0 % (ref 0.3–6.2)
ERYTHROCYTE [DISTWIDTH] IN BLOOD BY AUTOMATED COUNT: 12.9 % (ref 12.3–15.4)
GFR SERPL CREATININE-BSD FRML MDRD: 113 ML/MIN/1.73
GLUCOSE BLDC GLUCOMTR-MCNC: 138 MG/DL (ref 70–130)
GLUCOSE BLDC GLUCOMTR-MCNC: 158 MG/DL (ref 70–130)
GLUCOSE BLDC GLUCOMTR-MCNC: 163 MG/DL (ref 70–130)
GLUCOSE BLDC GLUCOMTR-MCNC: 170 MG/DL (ref 70–130)
GLUCOSE BLDC GLUCOMTR-MCNC: 70 MG/DL (ref 70–130)
GLUCOSE SERPL-MCNC: 172 MG/DL (ref 65–99)
HCT VFR BLD AUTO: 38.5 % (ref 34–46.6)
HGB BLD-MCNC: 12.1 G/DL (ref 12–15.9)
IMM GRANULOCYTES # BLD AUTO: 0.25 10*3/MM3 (ref 0–0.05)
IMM GRANULOCYTES NFR BLD AUTO: 2 % (ref 0–0.5)
LYMPHOCYTES # BLD AUTO: 0.34 10*3/MM3 (ref 0.7–3.1)
LYMPHOCYTES NFR BLD AUTO: 2.8 % (ref 19.6–45.3)
MAGNESIUM SERPL-MCNC: 2.2 MG/DL (ref 1.6–2.4)
MCH RBC QN AUTO: 30.8 PG (ref 26.6–33)
MCHC RBC AUTO-ENTMCNC: 31.4 G/DL (ref 31.5–35.7)
MCV RBC AUTO: 98 FL (ref 79–97)
MONOCYTES # BLD AUTO: 0.37 10*3/MM3 (ref 0.1–0.9)
MONOCYTES NFR BLD AUTO: 3 % (ref 5–12)
NEUTROPHILS NFR BLD AUTO: 11.25 10*3/MM3 (ref 1.7–7)
NEUTROPHILS NFR BLD AUTO: 92.2 % (ref 42.7–76)
NRBC BLD AUTO-RTO: 0 /100 WBC (ref 0–0.2)
PHOSPHATE SERPL-MCNC: 2.8 MG/DL (ref 2.5–4.5)
PLATELET # BLD AUTO: 315 10*3/MM3 (ref 140–450)
PMV BLD AUTO: 9.8 FL (ref 6–12)
POTASSIUM SERPL-SCNC: 3.8 MMOL/L (ref 3.5–5.2)
RBC # BLD AUTO: 3.93 10*6/MM3 (ref 3.77–5.28)
SODIUM SERPL-SCNC: 135 MMOL/L (ref 136–145)
WBC # BLD AUTO: 12.21 10*3/MM3 (ref 3.4–10.8)

## 2020-12-14 PROCEDURE — 80048 BASIC METABOLIC PNL TOTAL CA: CPT | Performed by: INTERNAL MEDICINE

## 2020-12-14 PROCEDURE — 83735 ASSAY OF MAGNESIUM: CPT | Performed by: INTERNAL MEDICINE

## 2020-12-14 PROCEDURE — 85025 COMPLETE CBC W/AUTO DIFF WBC: CPT | Performed by: INTERNAL MEDICINE

## 2020-12-14 PROCEDURE — 84100 ASSAY OF PHOSPHORUS: CPT | Performed by: INTERNAL MEDICINE

## 2020-12-14 PROCEDURE — 82962 GLUCOSE BLOOD TEST: CPT

## 2020-12-14 PROCEDURE — 70450 CT HEAD/BRAIN W/O DYE: CPT

## 2020-12-14 PROCEDURE — 25010000002 DEXAMETHASONE PER 1 MG: Performed by: NEUROLOGICAL SURGERY

## 2020-12-14 PROCEDURE — 99024 POSTOP FOLLOW-UP VISIT: CPT | Performed by: NEUROLOGICAL SURGERY

## 2020-12-14 PROCEDURE — 63710000001 INSULIN LISPRO (HUMAN) PER 5 UNITS: Performed by: NEUROLOGICAL SURGERY

## 2020-12-14 PROCEDURE — 99232 SBSQ HOSP IP/OBS MODERATE 35: CPT | Performed by: INTERNAL MEDICINE

## 2020-12-14 RX ORDER — LOSARTAN POTASSIUM 50 MG/1
50 TABLET ORAL ONCE
Status: COMPLETED | OUTPATIENT
Start: 2020-12-14 | End: 2020-12-14

## 2020-12-14 RX ORDER — LIDOCAINE HYDROCHLORIDE 10 MG/ML
INJECTION, SOLUTION EPIDURAL; INFILTRATION; INTRACAUDAL; PERINEURAL
Status: DISPENSED
Start: 2020-12-14 | End: 2020-12-14

## 2020-12-14 RX ORDER — LOSARTAN POTASSIUM 50 MG/1
100 TABLET ORAL DAILY
Status: DISCONTINUED | OUTPATIENT
Start: 2020-12-15 | End: 2020-12-15 | Stop reason: HOSPADM

## 2020-12-14 RX ADMIN — DEXAMETHASONE SODIUM PHOSPHATE 2 MG: 4 INJECTION, SOLUTION INTRA-ARTICULAR; INTRALESIONAL; INTRAMUSCULAR; INTRAVENOUS; SOFT TISSUE at 20:31

## 2020-12-14 RX ADMIN — DEXAMETHASONE SODIUM PHOSPHATE 2 MG: 4 INJECTION, SOLUTION INTRA-ARTICULAR; INTRALESIONAL; INTRAMUSCULAR; INTRAVENOUS; SOFT TISSUE at 04:00

## 2020-12-14 RX ADMIN — DEXAMETHASONE SODIUM PHOSPHATE 2 MG: 4 INJECTION, SOLUTION INTRA-ARTICULAR; INTRALESIONAL; INTRAMUSCULAR; INTRAVENOUS; SOFT TISSUE at 15:35

## 2020-12-14 RX ADMIN — SODIUM CHLORIDE, PRESERVATIVE FREE 10 ML: 5 INJECTION INTRAVENOUS at 20:32

## 2020-12-14 RX ADMIN — LEVOTHYROXINE SODIUM 75 MCG: 75 TABLET ORAL at 05:36

## 2020-12-14 RX ADMIN — LOSARTAN POTASSIUM 50 MG: 50 TABLET, FILM COATED ORAL at 08:10

## 2020-12-14 RX ADMIN — DEXAMETHASONE SODIUM PHOSPHATE 2 MG: 4 INJECTION, SOLUTION INTRA-ARTICULAR; INTRALESIONAL; INTRAMUSCULAR; INTRAVENOUS; SOFT TISSUE at 10:14

## 2020-12-14 RX ADMIN — INSULIN LISPRO 2 UNITS: 100 INJECTION, SOLUTION INTRAVENOUS; SUBCUTANEOUS at 08:09

## 2020-12-14 RX ADMIN — NICARDIPINE HYDROCHLORIDE 12.5 MG/HR: 2.5 INJECTION, SOLUTION INTRAVENOUS at 00:06

## 2020-12-14 RX ADMIN — Medication 5 MG: at 22:14

## 2020-12-14 RX ADMIN — LOSARTAN POTASSIUM 50 MG: 50 TABLET, FILM COATED ORAL at 10:14

## 2020-12-14 RX ADMIN — NICARDIPINE HYDROCHLORIDE 10 MG/HR: 2.5 INJECTION, SOLUTION INTRAVENOUS at 06:49

## 2020-12-14 NOTE — PROGRESS NOTES
"Clinical Nutrition Note      Patient Name: Gloria Willams  MRN: [id number]  Admission date: 12/10/2020      Multidisciplinary Rounds    Additional information obtained during MDR:  RN reports EVD dc'd, weaning off cardene okay to transfer out of ICU -plan for dc to IP rehab soon but she wants to go home.    Current diet: Diet Regular; Consistent Carbohydrate    Oral Nutrition Supplement:    Pertinent medical data reviewed:  No nutrition risk identified on nursing screen; MST score \"1\"    Intervention:  Plan of care and goals reviewed    Monitor:  RD to follow per protocol      Evie Santos MS,RD,LD  12/14/20 14:34 EST  Time: 15  mins       "

## 2020-12-14 NOTE — PLAN OF CARE
Goal Outcome Evaluation:  Plan of Care Reviewed With: patient  Progress: improving     Neuro unchanged. AM CT scan completed. ARANA, FC, and completely oriented. Pt's SBP maintained 120-140 with use of oscar gtt. Respiratory - Pt SPO2 >95% on 2L NC. Complaints of pain successfully addressed with PRN pain medication.  Peripheral IV in right AC started after the one in her left AC infiltrated. Good urinary output. Afebrile. Pt's SD drain's output: <10 ml. WCTM. SD drain anticipated to be removed this AM. WCTM.

## 2020-12-14 NOTE — PROGRESS NOTES
Continued Stay Note  [facility]     Patient Name: Gloria Willams  MRN: [id number]  Today's Date: 12/14/2020    Admit Date: 12/10/2020    Discharge Plan     Row Name 12/14/20 0904       Plan    Plan  ongoing    Plan Comments  Spoke [facility] Mrs. Willams at bedside regarding discharge planning.  She hopes to return home after discharge.  Referral has been make to [facility].  Awaiting PT/OT recs.    Final Discharge Disposition Code  30 - still a patient        Discharge Codes    No documentation.             Regina Membreno RN

## 2020-12-14 NOTE — PLAN OF CARE
Problem: Adult Inpatient Plan of Care  Goal: Plan of Care Review  Outcome: Ongoing, Progressing  Flowsheets (Taken 12/14/2020 1744)  Progress: improving  Plan of Care Reviewed With: patient  Outcome Summary: VSS. No neuro deficits. Losartan dose increased by intensivist and Cardene gtt weaned off. Subdural drain removed at bedside by Meagan PIERRE. Pt has denied h/a or pain today. Pt up to chair this afternoon. Before lunch, pt had BG of 70, but did not appear symptomatic. Followin lunch her BG was 163. Insulin regemin adjusted per Dash TSANG. Pt's daughter updated on plan of care by phone at the pt's bedside per pt request.     Problem: Fall Injury Risk  Goal: Absence of Fall and Fall-Related Injury  Outcome: Ongoing, Progressing  Intervention: Identify and Manage Contributors to Fall Injury Risk  Recent Flowsheet Documentation  Taken 12/14/2020 1600 by Lacey Hall RN  Medication Review/Management: medications reviewed  Taken 12/14/2020 1400 by Lacey Hall RN  Medication Review/Management: medications reviewed  Taken 12/14/2020 1200 by Lacey Hall RN  Medication Review/Management: medications reviewed  Taken 12/14/2020 1000 by Lacey Hall RN  Medication Review/Management: medications reviewed  Taken 12/14/2020 0800 by Lacey Hall RN  Medication Review/Management: medications reviewed  Intervention: Promote Injury-Free Environment  Recent Flowsheet Documentation  Taken 12/14/2020 1600 by Lacey Hall RN  Safety Promotion/Fall Prevention: safety round/check completed  Taken 12/14/2020 1400 by Lacey Hall RN  Safety Promotion/Fall Prevention: safety round/check completed  Taken 12/14/2020 1200 by Lacey Hall RN  Safety Promotion/Fall Prevention: safety round/check completed  Taken 12/14/2020 1000 by Lacey Hall RN  Safety Promotion/Fall Prevention: safety round/check completed  Taken 12/14/2020 0800 by Lacey Hall RN  Safety Promotion/Fall  Prevention: safety round/check completed     Problem: Skin Injury Risk Increased  Goal: Skin Health and Integrity  Outcome: Ongoing, Progressing  Intervention: Optimize Skin Protection  Recent Flowsheet Documentation  Taken 12/14/2020 1600 by Lacey Hall RN  Pressure Reduction Techniques: frequent weight shift encouraged  Pressure Reduction Devices: positioning supports utilized  Skin Protection:   tubing/devices free from skin contact   incontinence pads utilized  Taken 12/14/2020 1400 by Lacey Hall RN  Pressure Reduction Techniques: (assisted to chair)   frequent weight shift encouraged   other (see comments)  Pressure Reduction Devices: positioning supports utilized  Skin Protection:   tubing/devices free from skin contact   incontinence pads utilized  Taken 12/14/2020 1200 by Lacey Hall RN  Pressure Reduction Techniques: frequent weight shift encouraged  Pressure Reduction Devices: positioning supports utilized  Skin Protection: tubing/devices free from skin contact  Taken 12/14/2020 1000 by Lacey Hall RN  Pressure Reduction Techniques:   weight shift assistance provided   pressure points protected  Pressure Reduction Devices: positioning supports utilized  Skin Protection:   tubing/devices free from skin contact   incontinence pads utilized  Taken 12/14/2020 0800 by Lacey Hall RN  Pressure Reduction Techniques: frequent weight shift encouraged  Head of Bed (HOB): (trendelenburg) other (see comments)  Pressure Reduction Devices:   specialty bed utilized   positioning supports utilized  Skin Protection:   tubing/devices free from skin contact   incontinence pads utilized     Problem: Pain (Craniotomy/Craniectomy/Cranioplasty)  Goal: Acceptable Pain Control  Outcome: Ongoing, Progressing   Goal Outcome Evaluation:  Plan of Care Reviewed With: patient  Progress: improving  Outcome Summary: VSS. No neuro deficits. Losartan dose increased by intensivist and Cardene gtt weaned off.  Subdural drain removed at bedside by Meagan PIERRE. Pt has denied h/a or pain today. Pt up to chair this afternoon. Before lunch, pt had BG of 70, but did not appear symptomatic. Followin lunch her BG was 163. Insulin regemin adjusted per Dash TSANG. Pt's daughter updated on plan of care by phone at the pt's bedside per pt request.

## 2020-12-14 NOTE — PROGRESS NOTES
Intensive Care Follow-up     Hospital:  LOS: 4 days   Ms. Gloria Willams, 80 y.o. 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  She had a fall approximately one month prior and had an ongoing headache and PCP ordered an MRI which showed a left frontal SDH. She was seen in the office by neurosurgery on 12/1/2020 and deemed non-surgical at that time. She was set up for one month follow up but instructed to go to ED if symptoms worsened or new ones developed. MRI again showed the large left sided SDH with 5.5 mm midline shift to the right.   She underwent left craniotomy and evacuation of her subdural hematoma on December 11.  She initially required some Cardene for hypertension.  Postoperative day #1 a short course of dexamethasone was initiated      Subjective   Interval History:  Patient doing very well this morning.  Does have a slight headache.  No acute issues nausea is well controlled.  She denies any chest pain, shortness breath, fever, or chills.  Neurosurgery would like to keep her in Trendelenburg for the next 24 hours.  Postoperative day #2 nursing staff felt that her left upper extremity was slightly weaker.  Today her oxygen saturation is 95% on 2 L.  Her subdural drain put out less than 10 mL overnight.  CT scan done at 3 AM revealed postsurgical changes left frontal subdural hematoma evacuation without new hemorrhage or mass-effect       The patient's past medical, surgical and social history were reviewed and updated in Epic as appropriate.       Objective     Infusions:  niCARdipine, 5-15 mg/hr, Last Rate: 5 mg/hr  (12/14/20 1020)      Medications:  dexamethasone, 2 mg, Intravenous, Q6H  insulin lispro, 0-7 Units, Subcutaneous, TID AC  insulin lispro protamine-insulin lispro, 10 Units, Subcutaneous, BID With Meals  levothyroxine, 75 mcg, Oral, Q AM  lidocaine PF 1%, , ,   [START ON 12/15/2020] losartan, 100 mg, Oral, Daily  sodium chloride, 10 mL, Intravenous, Q12H      I reviewed the patient's medications.    Vital Sign Min/Max for last 24 hours  Temp  Min: 97.4 °F (36.3 °C)  Max: 97.6 °F (36.4 °C)   BP  Min: 101/50  Max: 164/70   Pulse  Min: 69  Max: 104   Resp  Min: 12  Max: 20   SpO2  Min: 83 %  Max: 96 %   Flow (L/min)  Min: 2  Max: 2       Input/Output for last 24 hour shift  12/13 0701 - 12/14 0700  In: 2153 [P.O.:1080; I.V.:1073]  Out: 1474 [Urine:1450; Drains:24]      GENERAL : NAD, conversant, craniotomy dressing intact.  Subdural drain present with scant bloody output.  RESPIRATORY/THORAX : normal respiratory effort and no intercostal retractions, CTAB  CARDIOVASCULAR : Normal S1/S2, RRR.  No lower ext edema.  GASTROINTESTINAL : Soft, NT/ND. BS x 4 normoactive. No hepatosplenomegaly.  MUSCULOSKELETAL : No cyanosis, clubbing, or ischemia  NEUROLOGICAL: alert and oriented to person, place and time  PSYCHOLOGICAL : Appropriate affect    Results from last 7 days   Lab Units 12/14/20  0637 12/13/20  0548 12/12/20  0445   WBC 10*3/mm3 12.21* 10.77 6.47   HEMOGLOBIN g/dL 12.1 12.0 12.1   PLATELETS 10*3/mm3 315 291 292     Results from last 7 days   Lab Units 12/14/20  0637 12/13/20  0548 12/12/20  0610   SODIUM mmol/L 135* 136 134*   POTASSIUM mmol/L 3.8 4.4 4.7   CO2 mmol/L 23.0 22.0 24.0   BUN mg/dL 20 20 16   CREATININE mg/dL 0.52* 0.58 0.63   MAGNESIUM mg/dL 2.2 2.1  --    PHOSPHORUS mg/dL 2.8 3.4  --    GLUCOSE mg/dL 172* 139* 155*     Estimated Creatinine Clearance: 54.7 mL/min (A) (by C-G formula based on SCr of 0.52 mg/dL (L)).          I reviewed the patient's new clinical results.  I reviewed the patient's new  imaging results/reports including actual images and agree with reports.       Imaging Results (Last 24 Hours)     Procedure Component Value Units Date/Time    CT Head Without Contrast [[id number]] Collected: 12/14/20 0812     Updated: 12/14/20 0818    Narrative:      EXAMINATION: CT HEAD WO CONTRAST-      INDICATION: sdh; S06.4Q3K-[id number] subdural hemorrhage with loss of  consciousness of unspecified duration, initial encounter; R27.0-Ataxia,  unspecified     TECHNIQUE: Axial noncontrast CT of the head with multiplanar  reconstruction     The radiation dose reduction device was turned on for each scan per the  ALARA (As Low as Reasonably Achievable) protocol.     COMPARISON: One day prior     FINDINGS: Redemonstrated postsurgical changes from left frontal  craniotomy for extra-axial hemorrhage evacuation. Unchanged small amount  of post procedural extra-axial fluid and minimal air. No increased mass  effect, evidence of new hemorrhage or territorial ischemia. Ventricles  are unchanged in size and configuration. Streak artifact noted from  right PICA aneurysm treatment. The orbits are normal and the paranasal  sinuses are grossly clear.       Impression:      Evolving postsurgical changes from recent left frontal  subdural hematoma evacuation, without evidence of new hemorrhage, mass  or mass effect.        This report was finalized on 12/14/2020 8:15 AM by David Escobedo.             Assessment/Plan   Impression        Subdural hematoma (CMS/HCC)    Hypothyroid    Hypertension       Plan        80-year-old female with a past medical history significant for hypothyroidism, hypertension.  Who presented to the ICU on 12/12/2020 with a worsening subdural hematoma on underwent a left-sided craniotomy for subdural hematoma evacuation monitor in the ICU postoperatively.     · Monitor drain output and neurologic exam  · Decadron 2 mg IV every 6 hours  · Sliding scale insulin +10 units twice daily with meals  · Continue  thyroid replacement  · Cozaar 100 mg daily for hypertension to maintain a systolic blood pressure less than or equal to 140  · Strict bedrest  · Monitor electrolytes and replace as needed    Plan of care and goals reviewed with mulitdisciplinary/antibiotic stewardship team during rounds.   I discussed the patient's findings and my recommendations with patient and nursing staff     Nancy Gorman MD  Pulmonary and critical care medicine

## 2020-12-14 NOTE — PROGRESS NOTES
Chief complaint: Subdural hematoma, brain compression of clinical significance, expressive aphasia, right hemiparesis    Admit Diagnosis:   Subdural hematoma (CMS/HCC) [S06.5X9A]  Subdural hematoma (CMS/HCC) [S06.5X9A]     Subjective: Postoperative day 3 status post craniotomy for evacuation of subacute left-sided subdural hematoma    Objective:    Vitals:    20 0900   BP: 128/62   Pulse: 87   Resp:    Temp:    SpO2: 94%     Pulse  Av.9  Min: 69  Max: 104  Systolic (24hrs), Av , Min:101 , Max:164     Diastolic (24hrs), Av, Min:50, Max:96    Temp (24hrs), Av.4 °F (36.3 °C), Min:96.8 °F (36 °C), Max:97.6 °F (36.4 °C)      She is sitting up in bed.  She is alert, conversant, and appropriate.  No paraphasic errors.  No pronator drift.    Her head CT from this morning demonstrates significant improvement in terms of her brain compression and midline shift.    Lab Results   Component Value Date     (L) 2020       A/P:   Discontinue subdural drain  Okay to transfer to [facility]  Anticipate discharge home versus rehab in the next day or 2

## 2020-12-15 VITALS
RESPIRATION RATE: 18 BRPM | OXYGEN SATURATION: 98 % | BODY MASS INDEX: 32.09 KG/M2 | HEIGHT: 62 IN | WEIGHT: 174.38 LBS | HEART RATE: 89 BPM | TEMPERATURE: 97.6 F | SYSTOLIC BLOOD PRESSURE: 153 MMHG | DIASTOLIC BLOOD PRESSURE: 87 MMHG

## 2020-12-15 LAB
ANION GAP SERPL CALCULATED.3IONS-SCNC: 8 MMOL/L (ref 5–15)
BASOPHILS # BLD MANUAL: 0 10*3/MM3 (ref 0–0.2)
BASOPHILS NFR BLD AUTO: 0 % (ref 0–1.5)
BUN SERPL-MCNC: 21 MG/DL (ref 8–23)
BUN/CREAT SERPL: 37.5 (ref 7–25)
CALCIUM SPEC-SCNC: 9.3 MG/DL (ref 8.6–10.5)
CHLORIDE SERPL-SCNC: 105 MMOL/L (ref 98–107)
CO2 SERPL-SCNC: 25 MMOL/L (ref 22–29)
CREAT SERPL-MCNC: 0.56 MG/DL (ref 0.57–1)
DEPRECATED RDW RBC AUTO: 44.9 FL (ref 37–54)
EOSINOPHIL # BLD MANUAL: 0 10*3/MM3 (ref 0–0.4)
EOSINOPHIL NFR BLD MANUAL: 0 % (ref 0.3–6.2)
ERYTHROCYTE [DISTWIDTH] IN BLOOD BY AUTOMATED COUNT: 12.9 % (ref 12.3–15.4)
GFR SERPL CREATININE-BSD FRML MDRD: 104 ML/MIN/1.73
GLUCOSE BLDC GLUCOMTR-MCNC: 127 MG/DL (ref 70–130)
GLUCOSE BLDC GLUCOMTR-MCNC: 132 MG/DL (ref 70–130)
GLUCOSE SERPL-MCNC: 154 MG/DL (ref 65–99)
HCT VFR BLD AUTO: 39.7 % (ref 34–46.6)
HGB BLD-MCNC: 12.7 G/DL (ref 12–15.9)
LYMPHOCYTES # BLD MANUAL: 0.37 10*3/MM3 (ref 0.7–3.1)
LYMPHOCYTES NFR BLD MANUAL: 1 % (ref 5–12)
LYMPHOCYTES NFR BLD MANUAL: 4 % (ref 19.6–45.3)
MAGNESIUM SERPL-MCNC: 2.1 MG/DL (ref 1.6–2.4)
MCH RBC QN AUTO: 30.5 PG (ref 26.6–33)
MCHC RBC AUTO-ENTMCNC: 32 G/DL (ref 31.5–35.7)
MCV RBC AUTO: 95.2 FL (ref 79–97)
METAMYELOCYTES NFR BLD MANUAL: 1 % (ref 0–0)
MONOCYTES # BLD AUTO: 0.09 10*3/MM3 (ref 0.1–0.9)
NEUTROPHILS # BLD AUTO: 8.61 10*3/MM3 (ref 1.7–7)
NEUTROPHILS NFR BLD MANUAL: 90 % (ref 42.7–76)
NEUTS BAND NFR BLD MANUAL: 2 % (ref 0–5)
OVALOCYTES BLD QL SMEAR: ABNORMAL
PHOSPHATE SERPL-MCNC: 3 MG/DL (ref 2.5–4.5)
PLAT MORPH BLD: NORMAL
PLATELET # BLD AUTO: 360 10*3/MM3 (ref 140–450)
PMV BLD AUTO: 10.2 FL (ref 6–12)
POTASSIUM SERPL-SCNC: 3.9 MMOL/L (ref 3.5–5.2)
QT INTERVAL: 394 MS
QTC INTERVAL: 425 MS
RBC # BLD AUTO: 4.17 10*6/MM3 (ref 3.77–5.28)
SODIUM SERPL-SCNC: 138 MMOL/L (ref 136–145)
VARIANT LYMPHS NFR BLD MANUAL: 2 % (ref 0–5)
WBC # BLD AUTO: 9.36 10*3/MM3 (ref 3.4–10.8)
WBC MORPH BLD: NORMAL

## 2020-12-15 PROCEDURE — 99024 POSTOP FOLLOW-UP VISIT: CPT | Performed by: NEUROLOGICAL SURGERY

## 2020-12-15 PROCEDURE — 85007 BL SMEAR W/DIFF WBC COUNT: CPT | Performed by: INTERNAL MEDICINE

## 2020-12-15 PROCEDURE — 97162 PT EVAL MOD COMPLEX 30 MIN: CPT

## 2020-12-15 PROCEDURE — 82962 GLUCOSE BLOOD TEST: CPT

## 2020-12-15 PROCEDURE — 25010000002 DEXAMETHASONE PER 1 MG: Performed by: NEUROLOGICAL SURGERY

## 2020-12-15 PROCEDURE — 83735 ASSAY OF MAGNESIUM: CPT | Performed by: INTERNAL MEDICINE

## 2020-12-15 PROCEDURE — 84100 ASSAY OF PHOSPHORUS: CPT | Performed by: INTERNAL MEDICINE

## 2020-12-15 PROCEDURE — 85025 COMPLETE CBC W/AUTO DIFF WBC: CPT | Performed by: INTERNAL MEDICINE

## 2020-12-15 PROCEDURE — 80048 BASIC METABOLIC PNL TOTAL CA: CPT | Performed by: INTERNAL MEDICINE

## 2020-12-15 PROCEDURE — 99239 HOSP IP/OBS DSCHRG MGMT >30: CPT | Performed by: NURSE PRACTITIONER

## 2020-12-15 RX ORDER — LOSARTAN POTASSIUM 100 MG/1
100 TABLET ORAL DAILY
Qty: 30 TABLET | Refills: 0 | Status: SHIPPED | OUTPATIENT
Start: 2020-12-16 | End: 2021-12-06 | Stop reason: ALTCHOICE

## 2020-12-15 RX ORDER — ASPIRIN 81 MG/1
81 TABLET ORAL DAILY
Qty: 30 TABLET | Refills: 0 | Status: SHIPPED | OUTPATIENT
Start: 2020-12-15

## 2020-12-15 RX ORDER — LOSARTAN POTASSIUM 100 MG/1
100 TABLET ORAL DAILY
Qty: 30 TABLET | Refills: 0 | Status: SHIPPED | OUTPATIENT
Start: 2020-12-16 | End: 2020-12-15

## 2020-12-15 RX ORDER — ASPIRIN 81 MG/1
81 TABLET ORAL DAILY
Qty: 30 TABLET | Refills: 0 | Status: SHIPPED | OUTPATIENT
Start: 2020-12-15 | End: 2020-12-15 | Stop reason: SDUPTHER

## 2020-12-15 RX ADMIN — NICARDIPINE HYDROCHLORIDE 5 MG/HR: 0.1 INJECTION, SOLUTION INTRAVENOUS at 00:20

## 2020-12-15 RX ADMIN — LOSARTAN POTASSIUM 100 MG: 50 TABLET, FILM COATED ORAL at 08:08

## 2020-12-15 RX ADMIN — DEXAMETHASONE SODIUM PHOSPHATE 2 MG: 4 INJECTION, SOLUTION INTRA-ARTICULAR; INTRALESIONAL; INTRAMUSCULAR; INTRAVENOUS; SOFT TISSUE at 10:22

## 2020-12-15 RX ADMIN — LEVOTHYROXINE SODIUM 75 MCG: 75 TABLET ORAL at 05:52

## 2020-12-15 RX ADMIN — SODIUM CHLORIDE, PRESERVATIVE FREE 10 ML: 5 INJECTION INTRAVENOUS at 08:16

## 2020-12-15 RX ADMIN — DEXAMETHASONE SODIUM PHOSPHATE 2 MG: 4 INJECTION, SOLUTION INTRA-ARTICULAR; INTRALESIONAL; INTRAMUSCULAR; INTRAVENOUS; SOFT TISSUE at 03:58

## 2020-12-15 RX ADMIN — NICARDIPINE HYDROCHLORIDE 5 MG/HR: 0.1 INJECTION, SOLUTION INTRAVENOUS at 04:23

## 2020-12-15 NOTE — PLAN OF CARE
Goal Outcome Evaluation:  Plan of Care Reviewed With: patient  Progress: improving  Outcome Summary: No acute events over night. Pt alert and oriented and neurologically intact. No complaints of pain. Cardene gtt restarted d/t SBP >140. Melatonin given for sleep per pt request. Dressing remained clean, dry and intact. VSS.

## 2020-12-15 NOTE — PROGRESS NOTES
Case Management Discharge Note      Final Note: Mrs. Willams being discharged today.  Home Health PT/OT has been arranged through [facility].  Walker has been ordered through [facility].  They will deliver walker to room.  daughter to transport home.    Provided Post Acute Provider List?: Yes  Post Acute Provider List: Inpatient Rehab  Provided Post Acute Provider Quality & Resource List?: Yes  Post Acute Provider Quality and Resource List: Inpatient Rehab  Delivered To: Patient  Method of Delivery: In person         Final Discharge Disposition Code: 06 - home with home health care

## 2020-12-15 NOTE — THERAPY EVALUATION
Patient Name: Gloria Willams  : 1940    MRN: [id number]                              Today's Date: 12/15/2020       Admit Date: 12/10/2020    Visit Dx:     ICD-10-CM ICD-9-CM   1. Subdural hematoma (CMS/HCC)  S06.5X9A 432.1   2. Ataxia  R27.0 781.3     Patient Active Problem List   Diagnosis   • Syncope   • Hypothyroid   • Hypertension   • Arthritis   • UTI (urinary tract infection)   • Syncope and collapse   • Vertebrobasilar artery syndrome   • Cerebellar meningioma (CMS/HCC)   • Intracranial aneurysm   • S/P coil embolization of cerebral aneurysm   • Subdural hematoma (CMS/HCC)     Past Medical History:   Diagnosis Date   • Arthritis    • Hypertension    • Hypothyroid      Past Surgical History:   Procedure Laterality Date   • CHOLECYSTECTOMY     • CRANIOTOMY FOR SUBDURAL HEMATOMA N/A 2020    Procedure: CRANIOTOMY FOR SUBDURAL HEMATOMA;  Surgeon: Jonas Cary MD;  Location:  [facility];  Service: Neurosurgery;  Laterality: N/A;   • HYSTERECTOMY     • MD PERM OCCLUSION/EMBOLIZATION,PERCUT,CNS N/A 2018    Procedure: IR intracranial embolization;  Surgeon: Jonas Cary MD;  Location:  [facility];  Service: Interventional Radiology   • MD PERM OCCLUSION/EMBOLIZATION,PERCUT,CNS N/A 2018    Procedure: IR intracranial embolization;  Surgeon: Jonas Cary MD;  Location:  [facility];  Service: Interventional Radiology   • MD SLCTV CATH INTRNL CAROTID ART ANGIO INTRCRNL ART Bilateral 3/27/2018    Procedure: IR internal carotid with angiography;  Surgeon: Jonas Cary MD;  Location:  [facility];  Service: Interventional Radiology   • MD SLCTV CATH INTRNL CAROTID ART ANGIO INTRCRNL ART Bilateral 2018    Procedure: IR internal carotid with angiography;  Surgeon: Jonas Cary MD;  Location:  [facility];  Service: Interventional Radiology   • MD SLCTV CATH INTRNL CAROTID  ART ANGIO INTRCRNL ART Bilateral 6/26/2018    Procedure: IR internal carotid with angiography;  Surgeon: Jonas Cary MD;  Location:  [facility];  Service: Interventional Radiology   • [place] SLCTV CATH VERTEBRAL ART ANGIO VERTEBRAL ARTERY Bilateral 3/27/2018    Procedure: IR vertebral artery with angiography;  Surgeon: Jonas Cary MD;  Location:  [facility];  Service: Interventional Radiology   • [place] SLCTV CATH VERTEBRAL ART ANGIO VERTEBRAL ARTERY Bilateral 4/17/2018    Procedure: IR vertebral artery with angiography;  Surgeon: Jonas Cary MD;  Location:  [facility];  Service: Interventional Radiology   • [place] SLCTV CATH VERTEBRAL ART ANGIO VERTEBRAL ARTERY Bilateral 6/26/2018    Procedure: IR vertebral artery with angiography;  Surgeon: Jonas Cary MD;  Location:  [facility];  Service: Interventional Radiology     General Information     Row Name 12/15/20 1216          Physical Therapy Time and Intention    Document Type  evaluation  -KG     Mode of Treatment  physical therapy  -KG     Row Name 12/15/20 1216          General Information    Patient Profile Reviewed  yes  -KG     Prior Level of Function  independent:;all household mobility;gait;transfer;ADL's;dressing;bathing  -KG     Existing Precautions/Restrictions  fall  -KG     Barriers to Rehab  none identified  -KG     Row Name 12/15/20 1216          Living Environment    Lives With  alone son lives next door  -KG     Row Name 12/15/20 1216          Home Main Entrance    Number of Stairs, Main Entrance  one  -KG     Stair Railings, Main Entrance  none  -KG     Row Name 12/15/20 1216          Stairs Within Home, Primary    Number of Stairs, Within Home, Primary  one  -KG     Stair Railings, Within Home, Primary  none  -KG     Row Name 12/15/20 1216          Cognition    Orientation Status (Cognition)  oriented x 3 intermittent situational  confusion/delayed processing  -KG     Row Name 12/15/20 1216          Safety Issues, Functional Mobility    Safety Issues Affecting Function (Mobility)  awareness of need for assistance;insight into deficits/self-awareness;safety precaution awareness;safety precautions follow-through/compliance  -KG     Impairments Affecting Function (Mobility)  balance;coordination;endurance/activity tolerance;postural/trunk control;strength  -KG       User Key  (r) = Recorded By, (t) = Taken By, (c) = Cosigned By    Initials Name Provider Type    KG Rachel Castrejon, PT Physical Therapist        Mobility     Row Name 12/15/20 1217          Bed Mobility    Bed Mobility  supine-sit  -KG     Supine-Sit [place] (Bed Mobility)  contact guard;verbal cues  -KG     Assistive Device (Bed Mobility)  bed rails;head of bed elevated  -KG     Comment (Bed Mobility)  VC's for sequencing. Pt required increased time to complete.  -KG     Row Name 12/15/20 1217          Transfers    Comment (Transfers)  VC's for sequencing and safe hand placement. Toilet transfer in bathroom with [place].  -KG     Row Name 12/15/20 1217          Sit-Stand Transfer    Sit-Stand [place] (Transfers)  contact guard;verbal cues  -KG     Assistive Device (Sit-Stand Transfers)  walker, front-wheeled  -KG     Row Name 12/15/20 1217          Gait/Stairs (Locomotion)    [place] Level (Gait)  contact guard;verbal cues  -KG     Assistive Device (Gait)  walker, front-wheeled  -KG     Distance in Feet (Gait)  300  -KG     Deviations/Abnormal Patterns (Gait)  base of support, narrow;macy decreased;stride length decreased  -KG     Bilateral Gait Deviations  forward flexed posture;heel strike decreased  -KG     Comment (Gait/Stairs)  Pt demonstrated step through gait pattern with slow macy and decreased step length. Pt required frequent cues to keep RW close with feet inside middle. Intermittent assistance required to steer RW; tendency to veer to L. Pt  demonstrated adequate stability with no LOB.  -KG       User Key  (r) = Recorded By, (t) = Taken By, (c) = Cosigned By    Initials Name Provider Type    Rachel Villar PT Physical Therapist        Obj/Interventions     Row Name 12/15/20 1219          Range of Motion Comprehensive    Comment, General Range of Motion  BLE WFL  -KG     Row Name 12/15/20 1219          Strength Comprehensive (MMT)    Comment, General Manual Muscle Testing (MMT) Assessment  BLE grossly 3+/5  -KG     Row Name 12/15/20 1219          Balance    Balance Assessment  sitting static balance;standing static balance;standing dynamic balance  -KG     Static Sitting Balance  WFL;sitting, edge of bed  -KG     Static Standing Balance  WFL;supported;standing  -KG     Dynamic Standing Balance  mild impairment;supported;standing  -KG     Row Name 12/15/20 1219          Sensory Assessment (Somatosensory)    Sensory Assessment (Somatosensory)  LE sensation intact  -KG       User Key  (r) = Recorded By, (t) = Taken By, (c) = Cosigned By    Initials Name Provider Type    Rachel Villar PT Physical Therapist        Goals/Plan     Row Name 12/15/20 1221          Bed Mobility Goal 1 (PT)    Activity/Assistive Device (Bed Mobility Goal 1, PT)  bed mobility activities, all  -KG     [place] Level/Cues Needed (Bed Mobility Goal 1, PT)  independent  -KG     Time Frame (Bed Mobility Goal 1, PT)  2 weeks  -KG     Progress/Outcomes (Bed Mobility Goal 1, PT)  goal ongoing  -KG     Row Name 12/15/20 1221          Transfer Goal 1 (PT)    Activity/Assistive Device (Transfer Goal 1, PT)  sit-to-stand/stand-to-sit;bed-to-chair/chair-to-bed;walker, rolling  -KG     [place] Level/Cues Needed (Transfer Goal 1, PT)  modified independence  -KG     Time Frame (Transfer Goal 1, PT)  2 weeks  -KG     Progress/Outcome (Transfer Goal 1, PT)  goal ongoing  -KG     Row Name 12/15/20 1221          Gait Training Goal 1 (PT)    Activity/Assistive Device  (Gait Training Goal 1, PT)  gait (walking locomotion);assistive device use;walker, rolling  -KG     [place] Level (Gait Training Goal 1, PT)  modified independence  -KG     Distance (Gait Training Goal 1, PT)  400 feet  -KG     Time Frame (Gait Training Goal 1, PT)  2 weeks  -KG     Progress/Outcome (Gait Training Goal 1, PT)  goal ongoing  -KG     Row Name 12/15/20 1221          Stairs Goal 1 (PT)    Activity/Assistive Device (Stairs Goal 1, PT)  ascending stairs;descending stairs;step-to-step  -KG     [place] Level/Cues Needed (Stairs Goal 1, PT)  supervision required  -KG     Number of Stairs (Stairs Goal 1, PT)  1  -KG     Time Frame (Stairs Goal 1, PT)  2 weeks  -KG     Progress/Outcome (Stairs Goal 1, PT)  goal ongoing  -KG       User Key  (r) = Recorded By, (t) = Taken By, (c) = Cosigned By    Initials Name Provider Type    KG Rachel Castrejon, PT Physical Therapist        Clinical Impression     Row Name 12/15/20 1220          Pain    Additional Documentation  Pain Scale: Numbers Pre/Post-Treatment (Group)  -KG     Row Name 12/15/20 1220          Pain Scale: Numbers Pre/Post-Treatment    Pretreatment Pain Rating  0/10 - no pain  -KG     Posttreatment Pain Rating  0/10 - no pain  -KG     Row Name 12/15/20 1220          Plan of Care Review    Plan of Care Reviewed With  patient  -KG     Outcome Summary  PT initial evaluation completed for pt s/p L craniotomy for SDH presenting with generalized weakness, impaired balance, and decreased functional mobility. Pt ambulated 300ft with RW and CGA. Pt's decreased independence warrants PT skilled care. Recommend D/C home with assistance and  PT services.  -KG     Row Name 12/15/20 1220          Therapy Assessment/Plan (PT)    Patient/Family Therapy Goals Statement (PT)  return to PLOF  -KG     Rehab Potential (PT)  good, to achieve stated therapy goals  -KG     Criteria for Skilled Interventions Met (PT)  yes;skilled treatment is necessary  -KG     Row  Name 12/15/20 1220          Vital Signs    Pre Systolic BP Rehab  131  -KG     Pre Treatment Diastolic BP  60  -KG     Post Systolic BP Rehab  142  -KG     Post Treatment Diastolic BP  72  -KG     Pretreatment Heart Rate (beats/min)  96  -KG     Posttreatment Heart Rate (beats/min)  92  -KG     Pre SpO2 (%)  95  -KG     O2 Delivery Pre Treatment  room air  -KG     Post SpO2 (%)  96  -KG     O2 Delivery Post Treatment  room air  -KG     Pre Patient Position  Supine  -KG     Intra Patient Position  Standing  -KG     Post Patient Position  Sitting  -KG     Row Name 12/15/20 1220          Positioning and Restraints    Pre-Treatment Position  in bed  -KG     Post Treatment Position  chair  -KG     In Chair  notified nsg;reclined;call light within reach;encouraged to call for assist;exit alarm on;RUE elevated;LUE elevated;legs elevated  -KG       User Key  (r) = Recorded By, (t) = Taken By, (c) = Cosigned By    Initials Name Provider Type    Rachel Villar PT Physical Therapist        Outcome Measures     Row Name 12/15/20 1222          How much help from another person do you currently need...    Turning from your back to your side while in flat bed without using bedrails?  3  -KG     Moving from lying on back to sitting on the side of a flat bed without bedrails?  3  -KG     Moving to and from a bed to a chair (including a wheelchair)?  3  -KG     Standing up from a chair using your arms (e.g., wheelchair, bedside chair)?  3  -KG     Climbing 3-5 steps with a railing?  3  -KG     To walk in hospital room?  3  -KG     AM-PAC 6 Clicks Score (PT)  18  -KG     Row Name 12/15/20 1222          Functional Assessment    Outcome Measure Options  AM-PAC 6 Clicks Basic Mobility (PT)  -KG       User Key  (r) = Recorded By, (t) = Taken By, (c) = Cosigned By    Initials Name Provider Type    Rachel Villar PT Physical Therapist        Physical Therapy Education                 Title: PT OT SLP Therapies (In  Progress)     Topic: Physical Therapy (In Progress)     Point: Mobility training (Done)     Learning Progress Summary           Patient Acceptance, E, VU,NR by KG at 12/15/2020 0926                   Point: Home exercise program (Not Started)     Learner Progress:  Not documented in this visit.          Point: Body mechanics (Done)     Learning Progress Summary           Patient Acceptance, E, VU,NR by KG at 12/15/2020 0926                   Point: Precautions (Done)     Learning Progress Summary           Patient Acceptance, E, VU,NR by KG at 12/15/2020 0926                               User Key     Initials Effective Dates Name Provider Type Discipline    KG 05/22/20 -  Rachel Castrejon, PT Physical Therapist PT              PT Recommendation and Plan  Planned Therapy Interventions (PT): balance training, bed mobility training, gait training, stair training, strengthening, transfer training  Plan of Care Reviewed With: patient  Outcome Summary: PT initial evaluation completed for pt s/p L craniotomy for SDH presenting with generalized weakness, impaired balance, and decreased functional mobility. Pt ambulated 300ft with RW and CGA. Pt's decreased independence warrants PT skilled care. Recommend D/C home with assistance and  PT services.     Time Calculation:   PT Charges     Row Name 12/15/20 0926             Time Calculation    Start Time  0926  -KG      PT Received On  12/15/20  -KG      PT Goal Re-Cert Due Date  12/25/20  -KG        User Key  (r) = Recorded By, (t) = Taken By, (c) = Cosigned By    Initials Name Provider Type    KG Rachel Castrejon, PT Physical Therapist        Therapy Charges for Today     Code Description Service Date Service Provider Modifiers Qty    51907926259 HC PT EVAL MOD COMPLEXITY 4 12/15/2020 Rachel Castrejon, PT GP 1    58234309416 HC PT THER SUPP EA 15 MIN 12/15/2020 Rachel Castrejon, PT GP 2          PT G-Codes  Outcome Measure Options: AM-PAC 6 Clicks Basic  Mobility (PT)  AM-PAC 6 Clicks Score (PT): 18    Gris Castrejon, PT  12/15/2020

## 2020-12-15 NOTE — DISCHARGE PLACEMENT REQUEST
"Arnel Willams (80 y.o. Female)     Date of Birth Social Security Number Address Home Phone MRN    1940  [address] [id number] [phone] [id number]    [employer] Mar[employer] Status          [employer]        Admission Date Admission Type Admitting Provider Attending Provider Department, Room/Bed    12/10/20 Emergency Ky Kapoor MD Gerhardstein, Donna C, MD [facility] 2B ICU, N224/1    Discharge Date Discharge Disposition Discharge Destination                       Attending Provider: Nancy Gorman MD    Allergies: No Known Allergies    Isolation: None   Infection: None   Code Status: CPR    Ht: 157.5 cm (62\")   Wt: 79.1 kg (174 lb 6.1 oz)    Admission Cmt: None   Principal Problem: Subdural hematoma (CMS/HCC) [S06.5X9A] More...                 Active Insurance as of 12/10/2020     Primary Coverage     Payor Plan Insurance Group Employer/Plan Group    HUMANA MEDICARE REPLACEMENT HUMANA MEDICARE REPLACEMENT [id number]     Payor Plan Address Payor Plan Phone Number Payor Plan Fax Number Effective Dates    [address] [address] [phone]  2013 - None Entered    [facility] [address]       Subscriber Name Subscriber Birth Date Member ID       ARNEL WILLAMS 1940 [id number]                 Emergency Contacts      (Rel.) Home Phone Work Phone Mobile Phone    Poncho Willams (Son) -- -- [phone]    wheatcamila walton (Daughter) -- -- [phone]        [facility] 2B ICU  [address] [place] [address]  Dept. Phone:  [phone]  Dept. Fax:  [phone] Date Ordered: Dec 15, 2020         Patient:  Arnel Willams MRN:  [id number]   [address] [facility] [address] :  1940  SSN:    Phone: [phone] Sex:  F     Weight: 79.1 kg (174 lb 6.1 oz)         Ht Readings from Last 1 Encounters:   20 157.5 cm (62\")         Walker               (Order ID: [id number])    Diagnosis:  Subdural hematoma (CMS/HCC) " (S06.5X9A [ICD-10-CM] 432.1 [ICD-9-CM])   Quantity:  1     Equipment:  Walker Folding with Wheels  Length of Need (99 Months = Lifetime): 99 Months = Lifetime        Authorizing Provider's Phone: [phone]   Verbal Order Mode: Verbal with readback   Authorizing Provider: Nancy Gorman MD  Authorizing Provider's NPI: [id number]     Order Entered By: Regina Membreno RN 12/15/2020 11:51 AM     Electronically signed by:                 History & Physical      Dawood Charles MD at 12/10/20 1729              [facility] Medicine Services  HISTORY AND PHYSICAL    Patient Name: Gloria Willams  : 1940  MRN: [id number]  Primary Care Physician: Sean Garcia MD  Date of admission: 12/10/2020      Subjective   Subjective     Chief Complaint: ataxia, slurred speech    HPI:  Gloria Willams is a 80 y.o. female with past medical history of prior PICA aneurysm s/p embolization , hypothyroidism on Synthroid, well-controlled hypertension, chronic subdural hematoma sec to previous fall who presents to the ED with 2 days of progressively worsening slurred speech, clumsiness, ataxia and word finding difficulties.  On arrival to the ED she is hemodynamically stable, work-up has included CBC and CMP that were essentially normal, INR 1.09.  CT scan of the head that revealed an expanding subdural hematoma.  Neurosurgery has been consulted, plan for craniotomy in the a.m., [facility] medicine was asked to admit.  At the time of evaluation, BP was in the 190s systolic.  She mentioned having intermittent headaches, speech had slightly improved.  Daughter was at bedside.    Current COVID Risks are:  [] Fever []  Cough [] Shortness of breath [] Fatigue [] Change in taste or smell    [] Exposure to COVID positive patient  [] High risk facility   []  NONE    Review of Systems   Gen- No fevers, chills  CV- No chest pain, palpitations  Resp- No cough, dyspnea  GI- No N/V/D, abd pain    All other  systems reviewed and are negative.     Personal History     Past Medical History:   Diagnosis Date   • Arthritis    • Hypertension    • Hypothyroid        Past Surgical History:   Procedure Laterality Date   • CHOLECYSTECTOMY     • HYSTERECTOMY     • DC PERM OCCLUSION/EMBOLIZATION,PERCUT,CNS N/A 4/17/2018    Procedure: IR intracranial embolization;  Surgeon: Jonas Cary MD;  Location:  [facility];  Service: Interventional Radiology   • DC PERM OCCLUSION/EMBOLIZATION,PERCUT,CNS N/A 6/26/2018    Procedure: IR intracranial embolization;  Surgeon: Jonas Cary MD;  Location:  [facility];  Service: Interventional Radiology   • DC [facility] CATH INTRNL CAROTID ART ANGIO INTRCRNL ART Bilateral 3/27/2018    Procedure: IR internal carotid with angiography;  Surgeon: Jonas Cary MD;  Location:  [facility];  Service: Interventional Radiology   • DC [facility] CATH INTRNL CAROTID ART ANGIO INTRCRNL ART Bilateral 4/17/2018    Procedure: IR internal carotid with angiography;  Surgeon: Jonas Cary MD;  Location:  [facility];  Service: Interventional Radiology   • DC SL[facility] CATH INTRNL CAROTID ART ANGIO INTRCRNL ART Bilateral 6/26/2018    Procedure: IR internal carotid with angiography;  Surgeon: Jonas Cary MD;  Location:  [facility];  Service: Interventional Radiology   • DC SLCTV CATH VERTEBRAL ART ANGIO VERTEBRAL ARTERY Bilateral 3/27/2018    Procedure: IR vertebral artery with angiography;  Surgeon: Jonas Cary MD;  Location:  [facility];  Service: Interventional Radiology   • DC SLCTV CATH VERTEBRAL ART ANGIO VERTEBRAL ARTERY Bilateral 4/17/2018    Procedure: IR vertebral artery with angiography;  Surgeon: Jonas Cary MD;  Location:  [facility];  Service: Interventional Radiology   • DC SL[facility]V CATH VERTEBRAL ART ANGIO  VERTEBRAL ARTERY Bilateral 6/26/2018    Procedure: IR vertebral artery with angiography;  Surgeon: Jonas Cary MD;  Location: [facility] INVASIVE LOCATION;  Service: Interventional Radiology       Family History: family history includes Diabetes in her mother; Heart disease in her father; Hypertension in her mother. Otherwise pertinent FHx was reviewed and unremarkable.     Social History:  reports that she has never smoked. She has never used smokeless tobacco. She reports that she does not drink alcohol or use drugs.  Social History     Social History Narrative   • Not on file       Medications:  Available home medication information reviewed.  (Not in a hospital admission)      No Known Allergies    Objective   Objective     Vital Signs:   Temp:  [98 °F (36.7 °C)] 98 °F (36.7 °C)  Heart Rate:  [75] 75  Resp:  [18] 18  BP: (157)/(82) 157/82       Physical Exam   Constitutional: Chronically ill-appearing elderly lady, awake, alert  Eyes: PERRLA, sclerae anicteric, no conjunctival injection  HENT: NCAT, mucous membranes moist  Neck: Supple, no thyromegaly, no lymphadenopathy, trachea midline  Respiratory: Clear to auscultation bilaterally, nonlabored respirations   Cardiovascular: RRR, no murmurs, rubs, or gallops, palpable pedal pulses bilaterally  Gastrointestinal: Positive bowel sounds, soft, nontender, nondistended  Musculoskeletal: No bilateral ankle edema, no clubbing or cyanosis to extremities  Psychiatric: Appropriate affect, cooperative  Neurologic: Oriented x 3, strength symmetric in all extremities, Cranial Nerves grossly intact to confrontation, speech is slow but clear  Skin: No rashes    Results Reviewed:  I have personally reviewed most recent indicated data and agree with findings including:  [x]  Laboratory  [x]  Radiology  []  EKG/Telemetry  []  Pathology  []  Cardiac/Vascular Studies  []  Old records  []  Other:  Most pertinent findings include:       LAB RESULTS:  Results from last  7 days   Lab 12/10/20  1759   WBC 4.20   HEMOGLOBIN 11.7*   HEMATOCRIT 38.0   PLATELETS 294   NEUTROS ABS 2.60   IMMATURE GRANS (ABS) 0.01   LYMPHS ABS 0.92   MONOS ABS 0.62   EOS ABS 0.03   MCV 96.7   PROTIME 13.8     Results from last 7 days   Lab 12/10/20  1759   SODIUM 136   POTASSIUM 4.4   CHLORIDE 102   CO2 24.0   ANION GAP 10.0   BUN 19   CREATININE 0.82   GLUCOSE 92   CALCIUM 9.4     Results from last 7 days   Lab 12/10/20  1759   TOTAL PROTEIN 6.5   ALBUMIN 3.80   GLOBULIN 2.7   ALT (SGPT) 10   AST (SGOT) 19   BILIRUBIN 0.7   ALK PHOS 101     Results from last 7 days   Lab 12/10/20  1759   PROTIME 13.8   INR 1.09                 Brief Urine Lab Results     None        Microbiology Results (last 10 days)     Procedure Component Value - Date/Time    COVID PRE-OP / PRE-PROCEDURE SCREENING ORDER (NO ISOLATION) - Swab, Nasopharynx [[id number]]  (Normal) Collected: 12/10/20 1759    Lab Status: Final result Specimen: Swab from Nasopharynx Updated: 12/10/20 1841    Narrative:      The following orders were created for panel order COVID PRE-OP / PRE-PROCEDURE SCREENING ORDER (NO ISOLATION) - Swab, Nasopharynx.  Procedure                               Abnormality         Status                     ---------                               -----------         ------                     COVID-19, ABBOTT IN-HOUS...[[id number]]  Normal              Final result                 Please view results for these tests on the individual orders.    COVID-19, ABBOTT IN-HOUSE,NP Swab (NO TRANSPORT MEDIA) 2 HR TAT - Swab, Nasopharynx [[id number]]  (Normal) Collected: 12/10/20 1759    Lab Status: Final result Specimen: Swab from Nasopharynx Updated: 12/10/20 1841     COVID19 Presumptive Negative    Narrative:      Fact sheet for providers: https://www.fda.gov/media/112270/download     Fact sheet for patients: https://www.fda.gov/media/095600/download    Test performed by PCR.  If inconsistent with clinical signs and symptoms patient  should be tested with different authorized molecular test.        Imaging Results (Last 24 Hours)     Procedure Component Value Units Date/Time    MRI Brain With & Without Contrast [[id number]] Resulted: 12/10/20 1838     Updated: 12/10/20 1838    XR Chest 1 View [[id number]] Collected: 12/10/20 1756     Updated: 12/10/20 1759    Narrative:      EXAMINATION: XR CHEST 1 VW-      INDICATION: pre op; S06.5Y9M-Ycftgchct subdural hemorrhage with loss of  consciousness of unspecified duration, initial encounter; R27.0-Ataxia,  unspecified      COMPARISON: 7/31/2016     FINDINGS: No focal airspace consolidation. No significant pleural  effusion or distinct pneumothorax. Redemonstrated moderate to large  hiatal hernia. Heart and mediastinal contours otherwise unremarkable.       Impression:      No evidence of acute disease in the chest.     This report was finalized on 12/10/2020 5:56 PM by David Escobedo.       CT Head Without Contrast [[id number]] Collected: 12/10/20 1653     Updated: 12/10/20 1756    Narrative:      EXAMINATION: CT HEAD WO CONTRAST 12/05/2020     INDICATION: Disoriented, difficult finding words.     TECHNIQUE: CT head without intravenous contrast.     The radiation dose reduction device was turned on for each scan per the  ALARA (As Low as Reasonably Achievable) protocol.     COMPARISON: CT head dated 11/30/2020.     FINDINGS: Heterogeneous extra-axial collection left frontal convexity  measuring up to 2.7 cm increase in size from prior comparison of  11/30/2020 where this was 2.2 along with increased at least moderate  hyperattenuation throughout of likely acute on chronic subdural hematoma  with increased mass effect and effacement left lateral ventricle with  rightward midline shift now 7 mm increased from prior comparison where  this was 4 mm. No intraventricular hemorrhage extension. Globes and  orbits unremarkable. Paranasal sinuses and mastoid air cells are grossly  clear and well pneumatized.  Calvarium intact. Status post embolization  with coiling material in the right vertebral region posterior fossa as  seen on prior.       Impression:      Increased thickness up to 2.7 cm left extra-axial fluid  collection along the left frontoparietal convexity extending towards the  vertex with heterogeneous signal including hyperattenuation of acute on  chronic subdural hematoma with increased effacement left lateral  ventricle and increased rightward midline shift at the level of the  septum pellucidum now 7 mm. No intraventricular hemorrhage extension or  hydrocephalus.     DICTATED:   12/10/2020  EDITED/ls :   12/10/2020            Results for orders placed during the hospital encounter of 07/31/16   Adult transthoracic echo complete    Narrative · All left ventricular wall segments contract normally.  · Mild to moderate tricuspid valve regurgitation is present.  · Left ventricular function is normal. Estimated EF = 65%.  · Left ventricular diastolic dysfunction (grade I) consistent with   impaired relaxation.  · Right ventricular cavity is borderline dilated.  · Mild pulmonary hypertension is present.          Assessment/Plan   Assessment & Plan     Active Hospital Problems    Diagnosis POA   • **Subdural hematoma (CMS/HCC) [S06.5X9A] Unknown     Added automatically from request for surgery [id number]     • Hypertension [I10] Yes   • Hypothyroid [E03.9] Yes     80-year-old female with prior history of intracranial aneurysm s/p embolization, hypothyroidism, hypertension, chronic subdural hematoma, previously on aspirin and Plavix.  Patient presents to the ED with 2 days of slurred speech, word finding difficulties, disorientation, ataxia admitted with an expanding subdural hematoma     Plan  Subdural hematoma  -Seems to be expanding by imaging as above  -Neurosurgery consulted, they reviewed imaging, plan is for craniotomy in the a.m., keep n.p.o. after midnight  -Follow-up preprocedure COVID-19  test    Hypertension-BP elevated in ED, discussed with Dr. Carey, please keep SBP <150s, resume home rx, cardene gtt in place    Hypothyroidism-continue Synthroid    DVT prophylaxis: mechanical    CODE STATUS:  full  Code Status and Medical Interventions:   Ordered at: 12/10/20 1829     Level Of Support Discussed With:    Patient     Code Status:    CPR     Medical Interventions (Level of Support Prior to Arrest):    Full       Admission Status:  I believe this patient meets INPATIENT status due to subdural hematoma will require craniotomy I feel patient’s risk for adverse outcomes and need for care warrant INPATIENT evaluation and I predict the patient’s care encounter to likely last beyond 2 midnights.    Dawood Charles MD  12/10/20    Electronically signed by Dawood Charles MD at 12/10/20 2045          Physician Progress Notes (last 24 hours) (Notes from 12/14/20 1152 through 12/15/20 1152)      Nancy Gorman MD at 12/14/20 1205          Intensive Care Follow-up     Hospital:  LOS: 4 days   Ms. Gloria Willams, 80 y.o. female is followed for:   Subdural hematoma (CMS/HCC)     Subjective       History of present illness:   Gloria Willams is an 80 year old female non-smoker with PMH significant for HTN, Hypothyroidism (synthroid), PICA aneurysm s/p embolization/coiling 4/28/2018, cerebellar meningioma, chronic SDH secondary to fall who presented to the ED on 12/10 with 2 day history of worsening slurred speech, word finding difficulty, clumsiness, and ataxia.  She had a fall approximately one month prior and had an ongoing headache and PCP ordered an MRI which showed a left frontal SDH. She was seen in the office by neurosurgery on 12/1/2020 and deemed non-surgical at that time. She was set up for one month follow up but instructed to go to ED if symptoms worsened or new ones developed. MRI again showed the large left sided SDH with 5.5 mm midline shift to the right.   She underwent left craniotomy and  evacuation of her subdural hematoma on December 11.  She initially required some Cardene for hypertension.  Postoperative day #1 a short course of dexamethasone was initiated      Subjective   Interval History:  Patient doing very well this morning.  Does have a slight headache.  No acute issues nausea is well controlled.  She denies any chest pain, shortness breath, fever, or chills.  Neurosurgery would like to keep her in Trendelenburg for the next 24 hours.  Postoperative day #2 nursing staff felt that her left upper extremity was slightly weaker.  Today her oxygen saturation is 95% on 2 L.  Her subdural drain put out less than 10 mL overnight.  CT scan done at 3 AM revealed postsurgical changes left frontal subdural hematoma evacuation without new hemorrhage or mass-effect       The patient's past medical, surgical and social history were reviewed and updated in Epic as appropriate.    Objective   Objective     Infusions:  niCARdipine, 5-15 mg/hr, Last Rate: 5 mg/hr (12/14/20 1020)      Medications:  dexamethasone, 2 mg, Intravenous, Q6H  insulin lispro, 0-7 Units, Subcutaneous, TID AC  insulin lispro protamine-insulin lispro, 10 Units, Subcutaneous, BID With Meals  levothyroxine, 75 mcg, Oral, Q AM  lidocaine PF 1%, , ,   [START ON 12/15/2020] losartan, 100 mg, Oral, Daily  sodium chloride, 10 mL, Intravenous, Q12H      I reviewed the patient's medications.    Vital Sign Min/Max for last 24 hours  Temp  Min: 97.4 °F (36.3 °C)  Max: 97.6 °F (36.4 °C)   BP  Min: 101/50  Max: 164/70   Pulse  Min: 69  Max: 104   Resp  Min: 12  Max: 20   SpO2  Min: 83 %  Max: 96 %   Flow (L/min)  Min: 2  Max: 2       Input/Output for last 24 hour shift  12/13 0701 - 12/14 0700  In: 2153 [P.O.:1080; I.V.:1073]  Out: 1474 [Urine:1450; Drains:24]      GENERAL : NAD, conversant, craniotomy dressing intact.  Subdural drain present with scant bloody output.  RESPIRATORY/THORAX : normal respiratory effort and no intercostal retractions,  CTAB  CARDIOVASCULAR : Normal S1/S2, RRR.  No lower ext edema.  GASTROINTESTINAL : Soft, NT/ND. BS x 4 normoactive. No hepatosplenomegaly.  MUSCULOSKELETAL : No cyanosis, clubbing, or ischemia  NEUROLOGICAL: alert and oriented to person, place and time  PSYCHOLOGICAL : Appropriate affect    Results from last 7 days   Lab Units 12/14/20  0637 12/13/20  0548 12/12/20  0445   WBC 10*3/mm3 12.21* 10.77 6.47   HEMOGLOBIN g/dL 12.1 12.0 12.1   PLATELETS 10*3/mm3 315 291 292     Results from last 7 days   Lab Units 12/14/20  0637 12/13/20  0548 12/12/20  0610   SODIUM mmol/L 135* 136 134*   POTASSIUM mmol/L 3.8 4.4 4.7   CO2 mmol/L 23.0 22.0 24.0   BUN mg/dL 20 20 16   CREATININE mg/dL 0.52* 0.58 0.63   MAGNESIUM mg/dL 2.2 2.1  --    PHOSPHORUS mg/dL 2.8 3.4  --    GLUCOSE mg/dL 172* 139* 155*     Estimated Creatinine Clearance: 54.7 mL/min (A) (by C-G formula based on SCr of 0.52 mg/dL (L)).          I reviewed the patient's new clinical results.  I reviewed the patient's new imaging results/reports including actual images and agree with reports.       Imaging Results (Last 24 Hours)     Procedure Component Value Units Date/Time    CT Head Without Contrast [[id number]] Collected: 12/14/20 0812     Updated: 12/14/20 0818    Narrative:      EXAMINATION: CT HEAD WO CONTRAST-      INDICATION: sdh; S06.5B9L-Rsqjmykdl subdural hemorrhage with loss of  consciousness of unspecified duration, initial encounter; R27.0-Ataxia,  unspecified     TECHNIQUE: Axial noncontrast CT of the head with multiplanar  reconstruction     The radiation dose reduction device was turned on for each scan per the  ALARA (As Low as Reasonably Achievable) protocol.     COMPARISON: One day prior     FINDINGS: Redemonstrated postsurgical changes from left frontal  craniotomy for extra-axial hemorrhage evacuation. Unchanged small amount  of post procedural extra-axial fluid and minimal air. No increased mass  effect, evidence of new hemorrhage or territorial  ischemia. Ventricles  are unchanged in size and configuration. Streak artifact noted from  right PICA aneurysm treatment. The orbits are normal and the paranasal  sinuses are grossly clear.       Impression:      Evolving postsurgical changes from recent left frontal  subdural hematoma evacuation, without evidence of new hemorrhage, mass  or mass effect.        This report was finalized on 12/14/2020 8:15 AM by David Escobedo.             Assessment/Plan   Impression        Subdural hematoma (CMS/HCC)    Hypothyroid    Hypertension       Plan        80-year-old female with a past medical history significant for hypothyroidism, hypertension.  Who presented to the ICU on 12/12/2020 with a worsening subdural hematoma on underwent a left-sided craniotomy for subdural hematoma evacuation monitor in the ICU postoperatively.     · Monitor drain output and neurologic exam  · Decadron 2 mg IV every 6 hours  · Sliding scale insulin +10 units twice daily with meals  · Continue thyroid replacement  · Cozaar 100 mg daily for hypertension to maintain a systolic blood pressure less than or equal to 140  · Strict bedrest  · Monitor electrolytes and replace as needed    Plan of care and goals reviewed with mulitdisciplinary/antibiotic stewardship team during rounds.   I discussed the patient's findings and my recommendations with patient and nursing staff     Nancy Gorman MD  Pulmonary and critical care medicine       Electronically signed by Nancy Gorman MD at 12/14/20 9458

## 2020-12-15 NOTE — DISCHARGE PLACEMENT REQUEST
"Case Management - Regina ANDERSON [id number]  Arnel Willams (80 y.o. Female)     Date of Birth Social Security Number Address Home Phone MRN    1940  [address] [facility] [address] [phone] [id number]    Worship Marital Status          Orthodoxy        Admission Date Admission Type Admitting Provider Attending Provider Department, Room/Bed    12/10/20 Emergency Ky Kapoor MD Gerhardstein, Donna C, MD [facility] 2B ICU, N224/1    Discharge Date Discharge Disposition Discharge Destination                       Attending Provider: Nancy Gorman MD    Allergies: No Known Allergies    Isolation: None   Infection: None   Code Status: CPR    Ht: 157.5 cm (62\")   Wt: 79.1 kg (174 lb 6.1 oz)    Admission Cmt: None   Principal Problem: Subdural hematoma (CMS/[facility]) [S06.5X9A] More...                 Active Insurance as of 12/10/2020     Primary Coverage     Payor Plan Insurance Group Employer/Plan Group    HUMANA MEDICARE REPLACEMENT HUMANA MEDICARE REPLACEMENT [id number]     Payor Plan Address Payor Plan Phone Number Payor Plan Fax Number Effective Dates    [address] [address] [phone]  2013 - None Entered    [facility] [address]       Subscriber Name Subscriber Birth Date Member ID       ARNEL WILLAMS 1940 [id number]                 Emergency Contacts      (Rel.) Home Phone Work Phone Mobile Phone    Poncho Willams (Son) -- -- [phone]    jarretcamila (Daughter) -- -- [phone]        [facility] 2B ICU  [facility] [facility] [address]  Phone:  [phone]  Fax:  [phone] Date: Dec 15, 2020      Ambulatory Referral to Home Health     Patient:  Arnel Wlilams MRN:  [id number]   [address] [facility] [address] :  1940  SSN:    Phone: [phone] Sex:  F      INSURANCE PAYOR PLAN GROUP # SUBSCRIBER ID   Primary:    HUMANA MEDICARE REPLACEMENT [id number]      Referring Provider " Information:  NANCY GORMAN Phone: [phone] Fax: [phone]      Referral Information:   # Visits:  1 Referral Type: Home Health [42]   Urgency:  Routine Referral Reason: Specialty Services Required   Start Date: Dec 15, 2020 End Date:  To be determined by Insurer   Diagnosis: Subdural hematoma (CMS/HCC) (S06.5X9A [ICD-10-CM] 432.1 [ICD-9-CM])      Refer to Dept:   Refer to Provider:   Refer to Facility:       Face to Face Visit Date: 12/15/2020  Follow-up provider for Plan of Care? I treated the patient in an acute care facility and will not continue treatment after discharge.  Follow-up provider: MICHAEL FRANK [[id number]]  Reason/Clinical Findings: subdural Hematoma  Describe mobility limitations that make leaving home difficult: impaired functional mobility, gait, balance and endurance  Nursing/Therapeutic Services Requested: Physical Therapy  Nursing/Therapeutic Services Requested: Occupational Therapy  PT orders: Therapeutic exercise  PT orders: Gait Training  PT orders: Transfer training  PT orders: Strengthening  Weight Bearing Status: As Tolerated  Occupational orders: Activities of daily living  Occupational orders: Strengthening  Frequency: 1 Week 1     This document serves as a request of services and does not constitute Insurance authorization or approval of services.  To determine eligibility, please contact the members Insurance carrier to verify and review coverage.     If you have medical questions regarding this request for services. Please contact [address] ICU at [phone] during normal business hours.       Verbal Order Mode: Verbal with readback   Authorizing Provider: Nancy Gorman MD  Authorizing Provider's NPI: [id number]     Order Entered By: Regina Membreno RN 12/15/2020 11:38 AM     Electronically signed by:

## 2020-12-15 NOTE — PROGRESS NOTES
----- Message from Carolyn Negrete sent at 2018  3:06 PM CDT -----  Contact: pt's daughter Mita Bucio  MRN: [id number]  : 1937  PCP: Tasha Atkins  Home Phone      [phone]  Work Phone      Not on file.  Mobile          [phone]      MESSAGE:  Wants to know if Staci would know of any pain cream we can prescribe for pt's shoulder. Says that pt tried various OTC creams with no effect. Please advise.  PHONE:  [phone]   Case Management Discharge Note      Final Note: Orders and clinicals faxed to [facility] @ [phone]    Provided Post Acute Provider List?: Yes  Post Acute Provider List: Inpatient Rehab  Provided Post Acute Provider Quality & Resource List?: Yes  Post Acute Provider Quality and Resource List: Inpatient Rehab  Delivered To: Patient  Method of Delivery: In person                 Final Discharge Disposition Code: 06 - home with home health care

## 2020-12-15 NOTE — DISCHARGE SUMMARY
Discharge Summary    Patient name: Gloria Willams  CSN: [id number]  MRN: [id number]  : 1940  Today's date: 12/15/2020     Date of Admission: 12/10/2020    Date of Discharge:  12/15/2020     Admitting Physician: ALFRED Seay MD    Attending Physician: TOY Gorman MD    Primary Care Provider: Sean Garcia MD    Consultations:   Dr. Cary- Neurosurgery    Admission Diagnosis:   Subdural hematoma (CMS/HCC)    Hypothyroid    Hypertension    Discharge Diagnoses:     Subdural hematoma (CMS/HCC)    Hypothyroid    Hypertension    Procedures:  Procedure(s):  CRANIOTOMY FOR SUBDURAL HEMATOMA     History of Present Illness:  Gloria Willams is a 80 y.o. female with past medical history of prior PICA aneurysm s/p embolization , hypothyroidism on Synthroid, well-controlled hypertension, chronic subdural hematoma sec to previous fall who presented to the ED with 2 days of progressively worsening slurred speech, clumsiness, ataxia and word finding difficulties.  On arrival to the ED she was hemodynamically stable, work-up has included CBC and CMP that were essentially normal, INR 1.09.  CT scan of the head that revealed an expanding subdural hematoma.  Neurosurgery has been consulted, plan for craniotomy in the a.m., hospital medicine was asked to admit.  At the time of evaluation, BP was in the 190s systolic.  She mentioned having intermittent headaches, speech had slightly improved.  Daughter was at bedside.    Hospital Course:  Gloria Willams is a 80 y.o. female who presented to [facility] as discussed in HPI.  She was admitted by the hospitalist service and seen in consult by neurosurgery.  Following day the patient was taken to the operating room for craniotomy.  After surgery she was taken to the ICU for elevated level of care.  She remained alert and oriented through hospital day #2.  Neurosurgery started a course of dexamethasone and requested that she remain in Trendelenburg position for 24  "hours.  By hospital day #4 subdural drain was able to be removed, and Decadron was weaned off.  Hospital day #5 the patient was deemed acceptable for discharge home.  She was evaluated by physical therapy with recommendation for home health and family assistance post discharge.    -Remain off Plavix until f/u   -F/u neurosurgery clinic 2 weeks for incision check   -F/u with Dr. Cary 1 month with repeat head CT       Vitals:  BP (!) 144/121   Pulse 96   Temp 97.6 °F (36.4 °C) (Axillary)   Resp 18   Ht 157.5 cm (62\")   Wt 79.1 kg (174 lb 6.1 oz)   SpO2 95%   BMI 31.90 kg/m²     Advance Directives: Code Status and Medical Interventions:   Ordered at: 12/11/20 1750     Level Of Support Discussed With:    Patient     Code Status:    CPR     Medical Interventions (Level of Support Prior to Arrest):    Full        Physical Exam:  Constitutional:  Appears well-developed and well-nourished. No distress. Up in chair.  HEENT:  Normocephalic and atraumatic. PERRL  Neck:  Neck supple. No JVD present.   CV: Normal rate, regular rhythm, intact distal pulses.  No gallop, murmur or rub.  Pulmonary/Chest: Effort normal and breath sounds normal. No respiratory distress. No wheezes, rhonchi or rales.   Abdominal: Soft. +BS. No distension and no mass. There is no tenderness.   Musculoskeletal: Normal muscle tone and strength  Neurological: Alert and oriented to person, place, and time.  No focal deficits  Skin: Skin is warm and dry. No rash noted.   Extremities:  No clubbing, edema or cyanosis  Psychiatric: Normal mood and affect. Behavior is normal.     Labs:  Results from last 7 days   Lab Units 12/15/20  0521   WBC 10*3/mm3 9.36   HEMOGLOBIN g/dL 12.7   HEMATOCRIT % 39.7   PLATELETS 10*3/mm3 360     Results from last 7 days   Lab Units 12/15/20  0521  12/11/20  1814   SODIUM mmol/L 138   < > 136   POTASSIUM mmol/L 3.9   < > 4.0   CHLORIDE mmol/L 105   < > 102   CO2 mmol/L 25.0   < > 22.0   BUN mg/dL 21   < > 13   CREATININE " mg/dL 0.56*   < > 0.76   CALCIUM mg/dL 9.3   < > 9.0   BILIRUBIN mg/dL  --   --  0.8   ALK PHOS U/L  --   --  138*   ALT (SGPT) U/L  --   --  19   AST (SGOT) U/L  --   --  45*   GLUCOSE mg/dL 154*   < > 147*    < > = values in this interval not displayed.         Magnesium   Date Value Ref Range Status   12/15/2020 2.1 1.6 - 2.4 mg/dL Final   12/14/2020 2.2 1.6 - 2.4 mg/dL Final   12/13/2020 2.1 1.6 - 2.4 mg/dL Final     Phosphorus   Date Value Ref Range Status   12/15/2020 3.0 2.5 - 4.5 mg/dL Final   12/14/2020 2.8 2.5 - 4.5 mg/dL Final   12/13/2020 3.4 2.5 - 4.5 mg/dL Final                 Discharge Medications:     Discharge Medications      Changes to Medications      Instructions Start Date   aspirin 81 MG EC tablet  What changed:   · medication strength  · how much to take   81 mg, Oral, Daily      losartan 100 MG tablet  Commonly known as: COZAAR  What changed:   · medication strength  · how much to take   100 mg, Oral, Daily   Start Date: December 16, 2020        Continue These Medications      Instructions Start Date   escitalopram 20 MG tablet  Commonly known as: LEXAPRO   20 mg, Oral, Daily      levothyroxine 75 MCG tablet  Commonly known as: SYNTHROID, LEVOTHROID   No dose, route, or frequency recorded.             Discharge Diet:   Consistent carbohydrates, thins    Activity at Discharge:   With assistance, home health    Follow-up Appointments  Future Appointments   Date Time Provider Department Center   12/21/2020 10:30 AM [facility] CT 1 BH [facility] CT SO [facility]   12/21/2020  2:30 PM Jonas Cary MD [facility]     Additional Instructions for the Follow-ups that You Need to Schedule     Ambulatory Referral to Home Health   As directed      Face to Face Visit Date: 12/15/2020    Follow-up provider for Plan of Care?: I treated the patient in an acute care facility and will not continue treatment after discharge.    Follow-up provider: MICHAEL FRANK [[id number]]    Reason/Clinical  Findings: subdural Hematoma    Describe mobility limitations that make leaving home difficult: impaired functional mobility, gait, balance and endurance    Nursing/Therapeutic Services Requested: Physical Therapy Occupational Therapy    PT orders: Therapeutic exercise Gait Training Transfer training Strengthening    Weight Bearing Status: As Tolerated    Occupational orders: Activities of daily living Strengthening    Frequency: 1 Week 1         Discharge Follow-up with PCP   As directed       Currently Documented PCP:    Sean Garcia MD    PCP Phone Number:    [phone]     Follow Up Details: ASAP for transition of care.         Discharge Follow-up with Specified Provider: Neurosurgry- Dr. Cary; 1 Month   As directed      To: Edward Cary    Follow Up: 1 Month               Discharge Instructions:  Home with assistance  Follow-up as above  Medications E prescribed     SHARAN Del Rio, ACNP-BC  Pulmonary & Critical Care Medicine    Time: I spent 40 minutes on this discharge activity which included: face-to-face encounter with the patient, reviewing the data in the system, coordination of the care with the nursing staff as well as consultants, documentation, and entering orders.       CC: Sean Garcia MD         [unfilled]

## 2020-12-15 NOTE — DISCHARGE PLACEMENT REQUEST
"Arnel Willams (80 y.o. Female)     Date of Birth Social Security Number Address Home Phone MRN    1940  [id number] [facility] [phone] [id number]    Gnosticist Marital Status          Faith        Admission Date Admission Type Admitting Provider Attending Provider Department, Room/Bed    12/10/20 Emergency Ky Kapoor MD Gerhardstein, Donna C, MD [facility] 2B ICU, N224/1    Discharge Date Discharge Disposition Discharge Destination                       Attending Provider: Nancy Gorman MD    Allergies: No Known Allergies    Isolation: None   Infection: None   Code Status: CPR    Ht: 157.5 cm (62\")   Wt: 79.1 kg (174 lb 6.1 oz)    Admission Cmt: None   Principal Problem: Subdural hematoma (CMS/HCC) [S06.5X9A] More...                 Active Insurance as of 12/10/2020     Primary Coverage     Payor Plan Insurance Group Employer/Plan Group    HUMANA MEDICARE REPLACEMENT HUMANA MEDICARE REPLACEMENT [id number]     Payor Plan Address Payor Plan Phone Number Payor Plan Fax Number Effective Dates    [address] [address] [phone]  2013 - None Entered    [facility] [id number]       Subscriber Name Subscriber Birth Date Member ID       ARNEL WILLAMS 1940 [id number]                 Emergency Contacts      (Rel.) Home Phone Work Phone Mobile Phone    Poncho Willams (Son) -- -- [phone]    wheatcamila walton (Daughter) -- -- [phone]               History & Physical      Dawood Charles MD at 12/10/20 [address] [place] [address] Medicine Services  HISTORY AND PHYSICAL    Patient Name: Arnel Willams  : 1940  MRN: [id number]  Primary Care Physician: Sean Garcia MD  Date of admission: 12/10/2020      Subjective   Subjective     Chief Complaint: ataxia, slurred speech    HPI:  Arnel Willams is a 80 y.o. female with past medical history of prior PICA aneurysm s/p embolization , hypothyroidism on Synthroid, " well-controlled hypertension, chronic subdural hematoma sec to previous fall who presents to the ED with 2 days of progressively worsening slurred speech, clumsiness, ataxia and word finding difficulties.  On arrival to the ED she is hemodynamically stable, work-up has included CBC and CMP that were essentially normal, INR 1.09.  CT scan of the head that revealed an expanding subdural hematoma.  Neurosurgery has been consulted, plan for craniotomy in the a.m., hospital medicine was asked to admit.  At the time of evaluation, BP was in the 190s systolic.  She mentioned having intermittent headaches, speech had slightly improved.  Daughter was at bedside.    Current COVID Risks are:  [] Fever []  Cough [] Shortness of breath [] Fatigue [] Change in taste or smell    [] Exposure to COVID positive patient  [] High risk facility   []  NONE    Review of Systems   Gen- No fevers, chills  CV- No chest pain, palpitations  Resp- No cough, dyspnea  GI- No N/V/D, abd pain    All other systems reviewed and are negative.     Personal History     Past Medical History:   Diagnosis Date   • Arthritis    • Hypertension    • Hypothyroid        Past Surgical History:   Procedure Laterality Date   • CHOLECYSTECTOMY     • HYSTERECTOMY     • ID PERM OCCLUSION/EMBOLIZATION,PERCUT,CNS N/A 4/17/2018    Procedure: IR intracranial embolization;  Surgeon: Jonas Cary MD;  Location:  [facility] CATH INVASIVE LOCATION;  Service: Interventional Radiology   • ID PERM OCCLUSION/EMBOLIZATION,PERCUT,CNS N/A 6/26/2018    Procedure: IR intracranial embolization;  Surgeon: Jonas Cary MD;  Location:  [facility] CATH INVASIVE LOCATION;  Service: Interventional Radiology   • ID [facility] CATH INTRNL CAROTID ART ANGIO INTRCRNL ART Bilateral 3/27/2018    Procedure: IR internal carotid with angiography;  Surgeon: Jonas Cary MD;  Location:  [facility] CATH INVASIVE LOCATION;  Service: Interventional Radiology   • ID [facility] CATH INTRNL  CAROTID ART ANGIO INTRCRNL ART Bilateral 4/17/2018    Procedure: IR internal carotid with angiography;  Surgeon: Jonas Cary MD;  Location:  [facility] CATH INVASIVE LOCATION;  Service: Interventional Radiology   • FL SLCTV CATH INTRNL CAROTID ART ANGIO INTRCRNL ART Bilateral 6/26/2018    Procedure: IR internal carotid with angiography;  Surgeon: Jonas Cary MD;  Location:  [facility] CATH INVASIVE LOCATION;  Service: Interventional Radiology   • FL SLCTV CATH VERTEBRAL ART ANGIO VERTEBRAL ARTERY Bilateral 3/27/2018    Procedure: IR vertebral artery with angiography;  Surgeon: Jonas Cary MD;  Location:  [facility] CATH INVASIVE LOCATION;  Service: Interventional Radiology   • FL SLCTV CATH VERTEBRAL ART ANGIO VERTEBRAL ARTERY Bilateral 4/17/2018    Procedure: IR vertebral artery with angiography;  Surgeon: Jonas Cary MD;  Location:  [facility] CATH INVASIVE LOCATION;  Service: Interventional Radiology   • FL SLCTV CATH VERTEBRAL ART ANGIO VERTEBRAL ARTERY Bilateral 6/26/2018    Procedure: IR vertebral artery with angiography;  Surgeon: Jonas Cary MD;  Location:  [facility] CATH INVASIVE LOCATION;  Service: Interventional Radiology       Family History: family history includes Diabetes in her mother; Heart disease in her father; Hypertension in her mother. Otherwise pertinent FHx was reviewed and unremarkable.     Social History:  reports that she has never smoked. She has never used smokeless tobacco. She reports that she does not drink alcohol or use drugs.  Social History     Social History Narrative   • Not on file       Medications:  Available home medication information reviewed.  (Not in a hospital admission)      No Known Allergies    Objective   Objective     Vital Signs:   Temp:  [98 °F (36.7 °C)] 98 °F (36.7 °C)  Heart Rate:  [75] 75  Resp:  [18] 18  BP: (157)/(82) 157/82       Physical Exam   Constitutional: Chronically ill-appearing elderly lady, awake,  alert  Eyes: PERRLA, sclerae anicteric, no conjunctival injection  HENT: NCAT, mucous membranes moist  Neck: Supple, no thyromegaly, no lymphadenopathy, trachea midline  Respiratory: Clear to auscultation bilaterally, nonlabored respirations   Cardiovascular: RRR, no murmurs, rubs, or gallops, palpable pedal pulses bilaterally  Gastrointestinal: Positive bowel sounds, soft, nontender, nondistended  Musculoskeletal: No bilateral ankle edema, no clubbing or cyanosis to extremities  Psychiatric: Appropriate affect, cooperative  Neurologic: Oriented x 3, strength symmetric in all extremities, Cranial Nerves grossly intact to confrontation, speech is slow but clear  Skin: No rashes    Results Reviewed:  I have personally reviewed most recent indicated data and agree with findings including:  [x]  Laboratory  [x]  Radiology  []  EKG/Telemetry  []  Pathology  []  Cardiac/Vascular Studies  []  Old records  []  Other:  Most pertinent findings include:       LAB RESULTS:  Results from last 7 days   Lab 12/10/20  1759   WBC 4.20   HEMOGLOBIN 11.7*   HEMATOCRIT 38.0   PLATELETS 294   NEUTROS ABS 2.60   IMMATURE GRANS (ABS) 0.01   LYMPHS ABS 0.92   MONOS ABS 0.62   EOS ABS 0.03   MCV 96.7   PROTIME 13.8     Results from last 7 days   Lab 12/10/20  1759   SODIUM 136   POTASSIUM 4.4   CHLORIDE 102   CO2 24.0   ANION GAP 10.0   BUN 19   CREATININE 0.82   GLUCOSE 92   CALCIUM 9.4     Results from last 7 days   Lab 12/10/20  1759   TOTAL PROTEIN 6.5   ALBUMIN 3.80   GLOBULIN 2.7   ALT (SGPT) 10   AST (SGOT) 19   BILIRUBIN 0.7   ALK PHOS 101     Results from last 7 days   Lab 12/10/20  1759   PROTIME 13.8   INR 1.09                 Brief Urine Lab Results     None        Microbiology Results (last 10 days)     Procedure Component Value - Date/Time    COVID PRE-OP / PRE-PROCEDURE SCREENING ORDER (NO ISOLATION) - Swab, Nasopharynx [[id number]]  (Normal) Collected: 12/10/20 1759    Lab Status: Final result Specimen: Swab from Nasopharynx  Updated: 12/10/20 1841    Narrative:      The following orders were created for panel order COVID PRE-OP / PRE-PROCEDURE SCREENING ORDER (NO ISOLATION) - Swab, Nasopharynx.  Procedure                               Abnormality         Status                     ---------                               -----------         ------                     COVID-19, [facility]BBOTT IN-HOUS...[[id number]]  Normal              Final result                 Please view results for these tests on the individual orders.    COVID-19, ABBOTT IN-HOUSE,NP Swab (NO TRANSPORT MEDIA) 2 HR TAT - Swab, Nasopharynx [[id number]]  (Normal) Collected: 12/10/20 1759    Lab Status: Final result Specimen: Swab from Nasopharynx Updated: 12/10/20 1841     COVID19 Presumptive Negative    Narrative:      Fact sheet for providers: https://www.fda.gov/media/719285/download     Fact sheet for patients: https://www.fda.gov/media/955158/download    Test performed by PCR.  If inconsistent with clinical signs and symptoms patient should be tested with different authorized molecular test.        Imaging Results (Last 24 Hours)     Procedure Component Value Units Date/Time    MRI Brain With & Without Contrast [[id number]] Resulted: 12/10/20 1838     Updated: 12/10/20 1838    XR Chest 1 View [[id number]] Collected: 12/10/20 1756     Updated: 12/10/20 1759    Narrative:      EXAMINATION: XR CHEST 1 VW-      INDICATION: pre op; S06.2Q4P-Vzxlreijk subdural hemorrhage with loss of  consciousness of unspecified duration, initial encounter; R27.0-Ataxia,  unspecified      COMPARISON: 7/31/2016     FINDINGS: No focal airspace consolidation. No significant pleural  effusion or distinct pneumothorax. Redemonstrated moderate to large  hiatal hernia. Heart and mediastinal contours otherwise unremarkable.       Impression:      No evidence of acute disease in the chest.     This report was finalized on 12/10/2020 5:56 PM by David Escobedo.       CT Head Without Contrast [[id number]]  Collected: 12/10/20 1653     Updated: 12/10/20 1756    Narrative:      EXAMINATION: CT HEAD WO CONTRAST 12/05/2020     INDICATION: Disoriented, difficult finding words.     TECHNIQUE: CT head without intravenous contrast.     The radiation dose reduction device was turned on for each scan per the  ALARA (As Low as Reasonably Achievable) protocol.     COMPARISON: CT head dated 11/30/2020.     FINDINGS: Heterogeneous extra-axial collection left frontal convexity  measuring up to 2.7 cm increase in size from prior comparison of  11/30/2020 where this was 2.2 along with increased at least moderate  hyperattenuation throughout of likely acute on chronic subdural hematoma  with increased mass effect and effacement left lateral ventricle with  rightward midline shift now 7 mm increased from prior comparison where  this was 4 mm. No intraventricular hemorrhage extension. Globes and  orbits unremarkable. Paranasal sinuses and mastoid air cells are grossly  clear and well pneumatized. Calvarium intact. Status post embolization  with coiling material in the right vertebral region posterior fossa as  seen on prior.       Impression:      Increased thickness up to 2.7 cm left extra-axial fluid  collection along the left frontoparietal convexity extending towards the  vertex with heterogeneous signal including hyperattenuation of acute on  chronic subdural hematoma with increased effacement left lateral  ventricle and increased rightward midline shift at the level of the  septum pellucidum now 7 mm. No intraventricular hemorrhage extension or  hydrocephalus.     DICTATED:   12/10/2020  EDITED/ls :   12/10/2020            Results for orders placed during the hospital encounter of 07/31/16   Adult transthoracic echo complete    Narrative · All left ventricular wall segments contract normally.  · Mild to moderate tricuspid valve regurgitation is present.  · Left ventricular function is normal. Estimated EF = 65%.  · Left ventricular  diastolic dysfunction (grade I) consistent with   impaired relaxation.  · Right ventricular cavity is borderline dilated.  · Mild pulmonary hypertension is present.          Assessment/Plan   Assessment & Plan     Active Hospital Problems    Diagnosis POA   • **Subdural hematoma (CMS/HCC) [S06.5X9A] Unknown     Added automatically from request for surgery [id number]     • Hypertension [I10] Yes   • Hypothyroid [E03.9] Yes     80-year-old female with prior history of intracranial aneurysm s/p embolization, hypothyroidism, hypertension, chronic subdural hematoma, previously on aspirin and Plavix.  Patient presents to the ED with 2 days of slurred speech, word finding difficulties, disorientation, ataxia admitted with an expanding subdural hematoma     Plan  Subdural hematoma  -Seems to be expanding by imaging as above  -Neurosurgery consulted, they reviewed imaging, plan is for craniotomy in the a.m., keep n.p.o. after midnight  -Follow-up preprocedure COVID-19 test    Hypertension-BP elevated in ED, discussed with Dr. Carey, please keep SBP <150s, resume home rx, cardene gtt in place    Hypothyroidism-continue Synthroid    DVT prophylaxis: mechanical    CODE STATUS:  full  Code Status and Medical Interventions:   Ordered at: 12/10/20 1829     Level Of Support Discussed With:    Patient     Code Status:    CPR     Medical Interventions (Level of Support Prior to Arrest):    Full       Admission Status:  I believe this patient meets INPATIENT status due to subdural hematoma will require craniotomy I feel patient’s risk for adverse outcomes and need for care warrant INPATIENT evaluation and I predict the patient’s care encounter to likely last beyond 2 midnights.    Dawood Charles MD  12/10/20    Electronically signed by Daowod Charles MD at 12/10/20 2045          Physician Progress Notes (last 24 hours) (Notes from 12/14/20 1258 through 12/15/20 1258)      Meagan Palmer PA-C at 12/15/20 1227        NEUROSURGERY  "PROGRESS NOTE    Interval History:   Postoperative day 4 status post craniotomy for evacuation of subacute left-sided subdural hematoma. No events overnight. Denies headache, visual changes, speech difficulties, weakness or numbness.     Vital Signs  Blood pressure 142/69, pulse 77, temperature 97.6 °F (36.4 °C), temperature source Oral, resp. rate 20, height 157.5 cm (62\"), weight 79.1 kg (174 lb 6.1 oz), SpO2 93 %, not currently breastfeeding.    Physical Exam:  Patient is awake, alert and oriented. She is resting comfortably in bed in NAD. Conversant and answers questions appropriately. CN II-XII grossly intact. No pronator drift.      Results Review:    I/O last 3 completed shifts:  In: 2343.6 [P.O.:720; I.V.:1623.6]  Out: 2435 [Urine:2425; Drains:10]  I/O this shift:  In: 198.6 [P.O.:100; I.V.:98.6]  Out: -       Assessment/Plan:   S/p Craniotomy for subdural hematoma   Patient evaluated by PT and plans to be discharged home today with HH and family assistance  Remain off Plavix until f/u   F/u neurosurgery clinic 2 weeks for incision check   F/u 1 month with repeat head CT     Meagan Palmer PA-C  12/15/20  12:27 EST      Electronically signed by Meagan Palmer PA-C at 12/15/20 1241       Consult Notes (last 24 hours) (Notes from 12/14/20 1258 through 12/15/20 1258)    No notes of this type exist for this encounter.         "

## 2020-12-15 NOTE — PROGRESS NOTES
"NEUROSURGERY PROGRESS NOTE    Interval History:   Postoperative day 4 status post craniotomy for evacuation of subacute left-sided subdural hematoma. No events overnight. Denies headache, visual changes, speech difficulties, weakness or numbness.     Vital Signs  Blood pressure 142/69, pulse 77, temperature 97.6 °F (36.4 °C), temperature source Oral, resp. rate 20, height 157.5 cm (62\"), weight 79.1 kg (174 lb 6.1 oz), SpO2 93 %, not currently breastfeeding.    Physical Exam:  Patient is awake, alert and oriented. She is resting comfortably in bed in NAD. Conversant and answers questions appropriately. CN II-XII grossly intact. No pronator drift.      Results Review:    I/O last 3 completed shifts:  In: 2343.6 [P.O.:720; I.V.:1623.6]  Out: 2435 [Urine:2425; Drains:10]  I/O this shift:  In: 198.6 [P.O.:100; I.V.:98.6]  Out: -       Assessment/Plan:   S/p Craniotomy for subdural hematoma   Patient evaluated by PT and plans to be discharged home today with HH and family assistance  Remain off Plavix until f/u   F/u neurosurgery clinic 2 weeks for incision check   F/u 1 month with repeat head CT     Meagan Palmer PA-C  12/15/20  12:27 EST    "

## 2020-12-15 NOTE — PLAN OF CARE
Goal Outcome Evaluation:  Plan of Care Reviewed With: patient  Progress: improving  Outcome Summary: PT initial evaluation completed for pt s/p L craniotomy for SDH presenting with generalized weakness, impaired balance, and decreased functional mobility. Pt ambulated 300ft with RW and CGA. Pt's decreased independence warrants PT skilled care. Recommend D/C home with assistance and  PT services.

## 2020-12-16 ENCOUNTER — READMISSION MANAGEMENT (OUTPATIENT)
Dept: CALL CENTER | Facility: HOSPITAL | Age: 80
End: 2020-12-16

## 2020-12-16 NOTE — OUTREACH NOTE
Prep Survey      Responses   [facility] facility patient discharged from?  [place]   Is LACE score < 7 ?  No   Eligibility  Readm Mgmt   Discharge diagnosis  Subdural hematom [s/p craniotomy]   Does the patient have one of the following disease processes/diagnoses(primary or secondary)?  Other   Does the patient have Home health ordered?  Yes   What is the Home health agency?   [facility]    Is there a DME ordered?  No   Comments regarding appointments  Per AVS   Medication alerts for this patient  continue aspirin with changes   Prep survey completed?  Yes          Lynn Lubin RN

## 2020-12-21 ENCOUNTER — APPOINTMENT (OUTPATIENT)
Dept: CT IMAGING | Facility: HOSPITAL | Age: 80
End: 2020-12-21

## 2020-12-21 ENCOUNTER — READMISSION MANAGEMENT (OUTPATIENT)
Dept: CALL CENTER | Facility: HOSPITAL | Age: 80
End: 2020-12-21

## 2020-12-21 NOTE — OUTREACH NOTE
Medical Week 1 Survey      Responses   [facility] patient discharged from?  [place]   Does the patient have one of the following disease processes/diagnoses(primary or secondary)?  Other   Week 1 attempt successful?  Yes   Call start time  1246   Call end time  1300   Discharge diagnosis  Subdural hematom   Is patient permission given to speak with other caregiver?  Yes   List who call center can speak with  son or daughter.   Meds reviewed with patient/caregiver?  Yes   Is the patient having any side effects they believe may be caused by any medication additions or changes?  No   Does the patient have all medications ordered at discharge?  Yes   Is the patient taking all medications as directed (includes completed medication regime)?  Yes   Comments regarding appointments  Pt to see neuro surgery on December 28, 2020.   Does the patient have an appointment with their PCP within 7 days of discharge?  Yes   Has the patient kept scheduled appointments due by today?  N/A   What is the Home health agency?   [facility]    Has home health visited the patient within 72 hours of discharge?  Yes   Psychosocial issues?  No   Psychosocial comments  Pt's son lives next door.   Did the patient receive a copy of their discharge instructions?  Yes   Nursing interventions  Reviewed instructions with patient   What is the patient's perception of their health status since discharge?  Improving   Is the patient/caregiver able to teach back signs and symptoms related to disease process for when to call PCP?  Yes   Is the patient/caregiver able to teach back signs and symptoms related to disease process for when to call 911?  Yes   Is the patient/caregiver able to teach back the hierarchy of who to call/visit for symptoms/problems? PCP, Specialist, Home health nurse, Urgent Care, ED, 911  Yes   Additional teach back comments  Pt very sweet lady. Pt is doing well, she lives alone and her son lives  next door.  is visiting daily. Incision is healing with just one suture in place.   Week 1 call completed?  Yes          Nora Rubio RN

## 2020-12-29 ENCOUNTER — READMISSION MANAGEMENT (OUTPATIENT)
Dept: CALL CENTER | Facility: HOSPITAL | Age: 80
End: 2020-12-29

## 2020-12-29 NOTE — OUTREACH NOTE
Medical Week 2 Survey      Responses   [facility] patient discharged from?  [place]   Does the patient have one of the following disease processes/diagnoses(primary or secondary)?  Other   Week 2 attempt successful?  Yes   Call start time  1657   Discharge diagnosis  Subdural hematom   Call end time  1700   Meds reviewed with patient/caregiver?  Yes   Is the patient having any side effects they believe may be caused by any medication additions or changes?  No   Does the patient have all medications ordered at discharge?  Yes   Is the patient taking all medications as directed (includes completed medication regime)?  Yes   Does the patient have a primary care provider?   Yes   Does the patient have an appointment with their PCP within 7 days of discharge?  Yes   Has the patient kept scheduled appointments due by today?  N/A   What is the Home health agency?   [facility]    Has home health visited the patient within 72 hours of discharge?  Yes   Psychosocial issues?  No   Did the patient receive a copy of their discharge instructions?  Yes   Nursing interventions  Reviewed instructions with patient   What is the patient's perception of their health status since discharge?  Improving   Is the patient/caregiver able to teach back signs and symptoms related to disease process for when to call PCP?  Yes   Is the patient/caregiver able to teach back signs and symptoms related to disease process for when to call 911?  Yes   Is the patient/caregiver able to teach back the hierarchy of who to call/visit for symptoms/problems? PCP, Specialist, Home health nurse, Urgent Care, ED, 911  Yes   Additional teach back comments  states feeling very well, states afebrile, can feel the glue on suture, no sign of infection   Week 2 Call Completed?  Yes          Rose Mary Morley RN

## 2020-12-30 ENCOUNTER — OFFICE VISIT (OUTPATIENT)
Dept: NEUROSURGERY | Facility: CLINIC | Age: 80
End: 2020-12-30

## 2020-12-30 VITALS
SYSTOLIC BLOOD PRESSURE: 136 MMHG | DIASTOLIC BLOOD PRESSURE: 74 MMHG | TEMPERATURE: 97.1 F | BODY MASS INDEX: 30.88 KG/M2 | HEIGHT: 62 IN | WEIGHT: 167.8 LBS

## 2020-12-30 DIAGNOSIS — I62.00 SUBDURAL HEMORRHAGE (HCC): Primary | ICD-10-CM

## 2020-12-30 PROCEDURE — 99024 POSTOP FOLLOW-UP VISIT: CPT | Performed by: PHYSICIAN ASSISTANT

## 2020-12-30 NOTE — PROGRESS NOTES
NAME: ARNEL WILLAMS   DOS: 2020  : 1940  PCP: Sean Garcia MD    Chief Complaint:  Post-op and Suture / Staple Removal      History of Present Illness: Ms. Willams is a 80 y.o. female who is seen status post craniotomy for evacuation of subacute left sided subdural hematoma.  Patient states that she has done well since the procedure.  Denies any headache, visual changes, speech difficulties, weakness, or numbness.  Denies any signs of infection.  Has continued on aspirin 81 mg but is continuing to hold her Plavix.  She is seen today for wound check and suture removal.      Past Medical History:   Diagnosis Date   • Arthritis    • Hypertension    • Hypothyroid        Past Surgical History:   Procedure Laterality Date   • CHOLECYSTECTOMY     • CRANIOTOMY FOR SUBDURAL HEMATOMA N/A 2020    Procedure: CRANIOTOMY FOR SUBDURAL HEMATOMA;  Surgeon: Jonas Cary MD;  Location:  [facility];  Service: Neurosurgery;  Laterality: N/A;   • HYSTERECTOMY     • AZ PERM OCCLUSION/EMBOLIZATION,PERCUT,CNS N/A 2018    Procedure: IR intracranial embolization;  Surgeon: Jonas Cary MD;  Location:  [facility];  Service: Interventional Radiology   • AZ PERM OCCLUSION/EMBOLIZATION,PERCUT,CNS N/A 2018    Procedure: IR intracranial embolization;  Surgeon: Jonas Cary MD;  Location:  [facility];  Service: Interventional Radiology   • AZ SLCTV CATH INTRNL CAROTID ART ANGIO INTRCRNL ART Bilateral 3/27/2018    Procedure: IR internal carotid with angiography;  Surgeon: Jonas Cary MD;  Location:  [facility];  Service: Interventional Radiology   • AZ SLCTV CATH INTRNL CAROTID ART ANGIO INTRCRNL ART Bilateral 2018    Procedure: IR internal carotid with angiography;  Surgeon: Jonas Cary MD;  Location:  [facility];  Service: Interventional Radiology   • AZ SLCTV CATH INTRNL CAROTID  ART ANGIO INTRCRNL ART Bilateral 6/26/2018    Procedure: IR internal carotid with angiography;  Surgeon: Jonas Cary MD;  Location:  [facility];  Service: Interventional Radiology   • PA SLCTV CATH VERTEBRAL ART ANGIO VERTEBRAL ARTERY Bilateral 3/27/2018    Procedure: IR vertebral artery with angiography;  Surgeon: Jonas Cary MD;  Location:  [facility];  Service: Interventional Radiology   • PA SLCTV CATH VERTEBRAL ART ANGIO VERTEBRAL ARTERY Bilateral 4/17/2018    Procedure: IR vertebral artery with angiography;  Surgeon: Jonas Cary MD;  Location:  [facility];  Service: Interventional Radiology   • PA SLCTV CATH VERTEBRAL ART ANGIO VERTEBRAL ARTERY Bilateral 6/26/2018    Procedure: IR vertebral artery with angiography;  Surgeon: Jonas Cary MD;  Location:  [facility];  Service: Interventional Radiology             Review of Systems   Constitutional: Negative for activity change, appetite change, chills, diaphoresis, fatigue, fever and unexpected weight change.   HENT: Negative for congestion, dental problem, drooling, ear discharge, ear pain, facial swelling, hearing loss, mouth sores, nosebleeds, postnasal drip, rhinorrhea, sinus pressure, sneezing, sore throat, tinnitus, trouble swallowing and voice change.    Eyes: Positive for visual disturbance (blurred vision). Negative for photophobia, pain, discharge, redness and itching.   Respiratory: Negative for apnea, cough, choking, chest tightness, shortness of breath, wheezing and stridor.    Cardiovascular: Negative for chest pain, palpitations and leg swelling.   Gastrointestinal: Negative for abdominal distention, abdominal pain, anal bleeding, blood in stool, constipation, diarrhea, nausea, rectal pain and vomiting.   Endocrine: Negative for cold intolerance, heat intolerance, polydipsia, polyphagia and polyuria.   Genitourinary: Negative  for decreased urine volume, difficulty urinating, dysuria, enuresis, flank pain, frequency, genital sores, hematuria and urgency.   Musculoskeletal: Negative for arthralgias, back pain, gait problem, joint swelling, myalgias, neck pain and neck stiffness.   Skin: Negative for color change, pallor, rash and wound.   Allergic/Immunologic: Negative for environmental allergies, food allergies and immunocompromised state.   Neurological: Positive for speech difficulty and numbness (right hand). Negative for dizziness, tremors, seizures, syncope, facial asymmetry, weakness, light-headedness and headaches.   Hematological: Negative for adenopathy. Does not bruise/bleed easily.   Psychiatric/Behavioral: Negative for agitation, behavioral problems, confusion, decreased concentration, dysphoric mood, hallucinations, self-injury, sleep disturbance and suicidal ideas. The patient is not nervous/anxious and is not hyperactive.    All other systems reviewed and are negative.       Medications:    Current Outpatient Medications:   •  aspirin (aspirin) 81 MG EC tablet, Take 1 tablet by mouth Daily., Disp: 30 tablet, Rfl: 0  •  escitalopram (LEXAPRO) 20 MG tablet, Take 20 mg by mouth Daily., Disp: , Rfl:   •  levothyroxine (SYNTHROID, LEVOTHROID) 75 MCG tablet, , Disp: , Rfl:   •  losartan (COZAAR) 100 MG tablet, Take 1 tablet by mouth Daily for 30 days., Disp: 30 tablet, Rfl: 0    Allergies:  No Known Allergies    Social History     Tobacco Use   • Smoking status: Never Smoker   • Smokeless tobacco: Never Used   Substance Use Topics   • Alcohol use: No   • Drug use: No       Family History   Problem Relation Age of Onset   • Diabetes Mother    • Hypertension Mother    • Heart disease Father        Review of Imaging:  No new imaging to review    Vitals:    12/30/20 1438   BP: 136/74   Temp: 97.1 °F (36.2 °C)     Body mass index is 30.69 kg/m².    Physical Exam  Constitutional:       Appearance: She is normal weight.   HENT:       Head: Normocephalic and atraumatic.      Mouth/Throat:      Mouth: Mucous membranes are moist.      Pharynx: Oropharynx is clear.   Eyes:      Extraocular Movements: Extraocular movements intact.      Conjunctiva/sclera: Conjunctivae normal.   Cardiovascular:      Rate and Rhythm: Normal rate.   Pulmonary:      Effort: Pulmonary effort is normal. No respiratory distress.   Skin:            Comments: Incision is dry and intact.  Is well approximated.  There is no evidence of erythema, edema, or purulence.   Neurological:      Mental Status: She is alert and oriented to person, place, and time.      Gait: Gait is intact.      Deep Tendon Reflexes: Strength normal.       Neurologic Exam     Mental Status   Oriented to person, place, and time.     Cranial Nerves   Cranial nerves II through XII intact.     Motor Exam   Muscle bulk: normal  Overall muscle tone: normal  Right arm pronator drift: absent  Left arm pronator drift: absent    Strength   Strength 5/5 throughout.     Sensory Exam   Light touch normal.     Gait, Coordination, and Reflexes     Gait  Gait: normal      Diagnoses/Plan:    Ms. Willams is a 80 y.o. female who is seen today status post craniotomy for subdural hematoma.  Patient has done well since the procedure.  The patient denies any signs of infection and incision is healing appropriately.  Therefore, will have patient follow-up in 1 month with a repeat head CT.  Patient will remain off Plavix until the follow-up head CT.   Signs and symptoms were reviewed with patient that would warrant a sooner call to the clinic/911.  Patient and daughter noted understanding of this plan and willing to proceed.  All questions and concerns were answered.      Patient's Body mass index is 30.69 kg/m². BMI is above normal parameters. Recommendations include: educational material.                Jenny Conte PA-C

## 2020-12-31 ENCOUNTER — TELEPHONE (OUTPATIENT)
Dept: NEUROSURGERY | Facility: CLINIC | Age: 80
End: 2020-12-31

## 2020-12-31 DIAGNOSIS — I62.00 SUBDURAL HEMORRHAGE (HCC): Primary | ICD-10-CM

## 2021-01-06 ENCOUNTER — TELEPHONE (OUTPATIENT)
Dept: NEUROSURGERY | Facility: CLINIC | Age: 81
End: 2021-01-06

## 2021-01-06 NOTE — TELEPHONE ENCOUNTER
Caller: ARNEL SAENZ    Relationship to patient: PT    Best call back number: [phone]    Chief complaint: CHECKING STATUS OF FOLLOW UP AND CT HEAD SCHEDULING - PER PT, SHE IS TO FOLLOW UP IN OFFICE AROUND 1/13/21 AND WILL NEED TO ARRANGE TRANSPORTATION.    Type of visit: FOLLOW UP     Requested date: ANY    If rescheduling, when is the original appointment: N/A    Additional notes: LAST OV NOTE FROM 12/30/20 VISIT WITH ANAIS PLAZA READS FOLLOW UP IN 1 MONTH AND RETURN AROUND 1/13/21. CT ORDERS ARE IN CHART BUT NOT YET READY TO SCHEDULE.    PLEASE ADVISE ON SCHEDULING. WAIT UNTIL CT SCHEDULED TO SCHEDULE FOLLOW UP?    THANK YOU.

## 2021-01-15 ENCOUNTER — OFFICE VISIT (OUTPATIENT)
Dept: NEUROSURGERY | Facility: CLINIC | Age: 81
End: 2021-01-15

## 2021-01-15 ENCOUNTER — HOSPITAL ENCOUNTER (OUTPATIENT)
Dept: CT IMAGING | Facility: HOSPITAL | Age: 81
Discharge: HOME | End: 2021-01-15
Admitting: NEUROLOGICAL SURGERY

## 2021-01-15 VITALS
DIASTOLIC BLOOD PRESSURE: 80 MMHG | BODY MASS INDEX: 30.55 KG/M2 | HEART RATE: 77 BPM | WEIGHT: 166 LBS | RESPIRATION RATE: 16 BRPM | SYSTOLIC BLOOD PRESSURE: 128 MMHG | HEIGHT: 62 IN | TEMPERATURE: 97.1 F

## 2021-01-15 DIAGNOSIS — Z98.890 S/P COIL EMBOLIZATION OF CEREBRAL ANEURYSM: Primary | ICD-10-CM

## 2021-01-15 DIAGNOSIS — I62.00 SUBDURAL HEMORRHAGE (HCC): ICD-10-CM

## 2021-01-15 DIAGNOSIS — S06.5XAA SUBDURAL HEMATOMA (HCC): ICD-10-CM

## 2021-01-15 PROCEDURE — 70450 CT HEAD/BRAIN W/O DYE: CPT

## 2021-01-15 PROCEDURE — 99024 POSTOP FOLLOW-UP VISIT: CPT | Performed by: NEUROLOGICAL SURGERY

## 2021-01-15 NOTE — PROGRESS NOTES
Subjective     Chief Complaint: Follow-up subdural hematoma    Patient ID: Gloria Willams is a 80 y.o. female is here today for follow-up.    History of Present Illness    This is an 80-year-old woman in whom I performed a left frontal craniotomy for an acute on chronic subdural hematoma about a month ago.  She presents today for routine follow-up.  She denies any headaches, nausea, vomiting, strokelike symptoms, or seizures.    The following portions of the patient's history were reviewed and updated as appropriate: allergies, current medications, past family history, past medical history, past social history, past surgical history and problem list.    Family history:   Family History   Problem Relation Age of Onset   • Diabetes Mother    • Hypertension Mother    • Heart disease Father        Social history:   Social History     Socioeconomic History   • Marital status:      Spouse name: Not on file   • Number of children: Not on file   • Years of education: Not on file   • Highest education level: Not on file   Tobacco Use   • Smoking status: Never Smoker   • Smokeless tobacco: Never Used   Substance and Sexual Activity   • Alcohol use: No   • Drug use: No   • Sexual activity: Defer       Review of Systems   Constitutional: Negative for activity change, appetite change, chills, diaphoresis, fatigue, fever and unexpected weight change.   HENT: Negative for congestion, dental problem, drooling, ear discharge, ear pain, facial swelling, hearing loss, mouth sores, nosebleeds, postnasal drip, rhinorrhea, sinus pressure, sinus pain, sneezing, sore throat, tinnitus, trouble swallowing and voice change.    Eyes: Negative for photophobia, pain, discharge, redness, itching and visual disturbance.   Respiratory: Negative for apnea, cough, choking, chest tightness, shortness of breath, wheezing and stridor.    Cardiovascular: Negative for chest pain, palpitations and leg swelling.   Gastrointestinal: Negative for  "abdominal distention, abdominal pain, anal bleeding, blood in stool, constipation, diarrhea, nausea, rectal pain and vomiting.   Endocrine: Negative for cold intolerance, heat intolerance, polydipsia, polyphagia and polyuria.   Genitourinary: Negative for decreased urine volume, difficulty urinating, dyspareunia, dysuria, enuresis, flank pain, frequency, genital sores, hematuria, menstrual problem, pelvic pain, urgency, vaginal bleeding, vaginal discharge and vaginal pain.   Musculoskeletal: Negative for arthralgias, back pain, gait problem, joint swelling, myalgias, neck pain and neck stiffness.   Skin: Negative for color change, pallor, rash and wound.   Allergic/Immunologic: Negative for environmental allergies, food allergies and immunocompromised state.   Neurological: Negative for dizziness, tremors, seizures, syncope, facial asymmetry, speech difficulty, weakness, light-headedness, numbness and headaches.   Hematological: Negative for adenopathy. Does not bruise/bleed easily.   Psychiatric/Behavioral: Negative for agitation, behavioral problems, confusion, decreased concentration, dysphoric mood, hallucinations, self-injury, sleep disturbance and suicidal ideas. The patient is not nervous/anxious and is not hyperactive.        Objective   Blood pressure 128/80, pulse 77, temperature 97.1 °F (36.2 °C), resp. rate 16, height 157.5 cm (62\"), weight 75.3 kg (166 lb), not currently breastfeeding.  Body mass index is 30.36 kg/m².    Physical Exam    Assessment/Plan     Independent Review of Radiographic Studies:      Available for my review is a CT scan of the head which was performed earlier today.  There has been complete resolution of her extra-axial fluid collection.    Medical Decision Making:      This is an 80-year-old woman who has recovered nicely following a craniotomy for an acute on chronic subdural hematoma.  She does not have any clinical or radiographic evidence of brain compression.  She is cleared " to resume all normal activities from my standpoint.  I reviewed the signs and symptoms of intracranial mass-effect.  I have got no further recommendations from the standpoint of her subdural.  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    Diagnoses and all orders for this visit:    1. S/P coil embolization of cerebral aneurysm (Primary)  -     MRI Angiogram Head With & Without Contrast; Future    2. Subdural hematoma (CMS/HCC)        No follow-ups on file.           This document signed by JUSTIN Cary MD January 15, 2021 13:13 EST

## 2021-01-18 ENCOUNTER — TELEPHONE (OUTPATIENT)
Dept: NEUROSURGERY | Facility: CLINIC | Age: 81
End: 2021-01-18

## 2021-01-18 NOTE — TELEPHONE ENCOUNTER
Per Dr. 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  She is a year and a half out from her coiling.  At this point, she can have a surveillance MRA of the brain with and without contrast in 2 years.    Therefore, the MRI will likely be near the end of this year unless the patient presents with symptoms that would make us move up the study.

## 2021-01-18 NOTE — TELEPHONE ENCOUNTER
"Gris would like clarification on when the patient needs to have her MRA Brain.     Dr. 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

## 2021-10-15 RX ORDER — LOSARTAN POTASSIUM 100 MG/1
TABLET ORAL
Qty: 30 TABLET | Refills: 0 | OUTPATIENT
Start: 2021-10-15

## 2021-12-06 ENCOUNTER — OFFICE VISIT (OUTPATIENT)
Dept: NEUROSURGERY | Facility: CLINIC | Age: 81
End: 2021-12-06

## 2021-12-06 ENCOUNTER — TELEPHONE (OUTPATIENT)
Dept: NEUROSURGERY | Facility: CLINIC | Age: 81
End: 2021-12-06

## 2021-12-06 ENCOUNTER — HOSPITAL ENCOUNTER (OUTPATIENT)
Dept: MRI IMAGING | Facility: HOSPITAL | Age: 81
Discharge: HOME | End: 2021-12-06
Admitting: NEUROLOGICAL SURGERY

## 2021-12-06 VITALS
DIASTOLIC BLOOD PRESSURE: 82 MMHG | TEMPERATURE: 97.1 F | BODY MASS INDEX: 30.66 KG/M2 | WEIGHT: 166.6 LBS | HEIGHT: 62 IN | SYSTOLIC BLOOD PRESSURE: 154 MMHG

## 2021-12-06 DIAGNOSIS — Z98.890 S/P COIL EMBOLIZATION OF CEREBRAL ANEURYSM: ICD-10-CM

## 2021-12-06 DIAGNOSIS — I67.1 INTRACRANIAL ANEURYSM: Primary | ICD-10-CM

## 2021-12-06 PROCEDURE — 82565 ASSAY OF CREATININE: CPT

## 2021-12-06 PROCEDURE — A9577 INJ MULTIHANCE: HCPCS | Performed by: NEUROLOGICAL SURGERY

## 2021-12-06 PROCEDURE — 0 GADOBENATE DIMEGLUMINE 529 MG/ML SOLUTION: Performed by: NEUROLOGICAL SURGERY

## 2021-12-06 PROCEDURE — 99214 OFFICE O/P EST MOD 30 MIN: CPT | Performed by: NEUROLOGICAL SURGERY

## 2021-12-06 PROCEDURE — 70546 MR ANGIOGRAPH HEAD W/O&W/DYE: CPT

## 2021-12-06 RX ORDER — LOSARTAN POTASSIUM 50 MG/1
50 TABLET ORAL DAILY
COMMUNITY
Start: 2021-11-30

## 2021-12-06 RX ADMIN — GADOBENATE DIMEGLUMINE 15 ML: 529 INJECTION, SOLUTION INTRAVENOUS at 14:14

## 2021-12-06 NOTE — PROGRESS NOTES
Subjective     Chief Complaint: Follow-up intracranial aneurysm    Patient ID: Gloria Willams is a 81 y.o. female is here today for follow-up.    History of Present Illness    This is an 81-year-old woman in whom I coiled a PICA aneurysm in 2018.  She presents today to discuss the results of her most recent surveillance MRA.    The following portions of the patient's history were reviewed and updated as appropriate: allergies, current medications, past family history, past medical history, past social history, past surgical history and problem list.    Family history:   Family History   Problem Relation Age of Onset   • Diabetes Mother    • Hypertension Mother    • Heart disease Father        Social history:   Social History     Socioeconomic History   • Marital status:    Tobacco Use   • Smoking status: Never Smoker   • Smokeless tobacco: Never Used   Substance and Sexual Activity   • Alcohol use: No   • Drug use: No   • Sexual activity: Defer       Review of Systems   Constitutional: Negative for activity change, appetite change, chills, diaphoresis, fatigue, fever and unexpected weight change.   HENT: Negative for congestion, dental problem, drooling, ear discharge, ear pain, facial swelling, hearing loss, mouth sores, nosebleeds, postnasal drip, rhinorrhea, sinus pressure, sinus pain, sneezing, sore throat, tinnitus, trouble swallowing and voice change.    Eyes: Negative for photophobia, pain, discharge, redness, itching and visual disturbance.   Respiratory: Negative for apnea, cough, choking, chest tightness, shortness of breath, wheezing and stridor.    Cardiovascular: Negative for chest pain, palpitations and leg swelling.   Gastrointestinal: Negative for abdominal distention, abdominal pain, anal bleeding, blood in stool, constipation, diarrhea, nausea, rectal pain and vomiting.   Endocrine: Negative for cold intolerance, heat intolerance, polydipsia, polyphagia and polyuria.   Genitourinary: Negative  "for decreased urine volume, difficulty urinating, dyspareunia, dysuria, enuresis, flank pain, frequency, genital sores, hematuria, menstrual problem, pelvic pain, urgency, vaginal bleeding, vaginal discharge and vaginal pain.   Musculoskeletal: Negative for arthralgias, back pain, gait problem, joint swelling, myalgias, neck pain and neck stiffness.   Skin: Negative for color change, pallor, rash and wound.   Allergic/Immunologic: Negative for environmental allergies, food allergies and immunocompromised state.   Neurological: Negative for dizziness, tremors, seizures, syncope, facial asymmetry, speech difficulty, weakness, light-headedness, numbness and headaches.   Hematological: Negative for adenopathy. Does not bruise/bleed easily.   Psychiatric/Behavioral: Negative for agitation, behavioral problems, confusion, decreased concentration, dysphoric mood, hallucinations, self-injury, sleep disturbance and suicidal ideas. The patient is not nervous/anxious and is not hyperactive.        Objective   Blood pressure 154/82, temperature 97.1 °F (36.2 °C), height 157.5 cm (62\"), weight 75.6 kg (166 lb 9.6 oz), not currently breastfeeding.  Body mass index is 30.47 kg/m².    Physical Exam  Constitutional:       General: She is not in acute distress.     Appearance: She is well-developed. She is not diaphoretic.   HENT:      Head: Normocephalic and atraumatic.   Pulmonary:      Effort: Pulmonary effort is normal.   Skin:     General: Skin is warm and dry.   Neurological:      Mental Status: She is alert and oriented to person, place, and time.      Cranial Nerves: No cranial nerve deficit.         Assessment/Plan     Independent Review of Radiographic Studies:      Available for my review is a MRA of the brain with and without contrast that was performed on December 6.  There is a small amount of residual aneurysm at the base of the coil mass at the origin of the PICA on the right.  This may be somewhat more prominent in " comparison to her MR a from January 2020.    Medical Decision Making:      Repeat MRI in 1 year.  Signs and symptoms of subarachnoid hemorrhage were once again reviewed with the patient.    Diagnoses and all orders for this visit:    1. Intracranial aneurysm (Primary)    2. S/P coil embolization of cerebral aneurysm  -     MRI Angiogram Head With & Without Contrast; Future        No follow-ups on file.           This document signed by JUSTIN Cary MD December 6, 2021 15:50 EST

## 2021-12-06 NOTE — TELEPHONE ENCOUNTER
Called patient to have her arrive a little bit early to update any needed paperwork, She will be right over after he MRA.

## 2021-12-15 LAB — CREAT BLDA-MCNC: 0.9 MG/DL (ref 0.6–1.3)

## 2022-12-05 ENCOUNTER — HOSPITAL ENCOUNTER (OUTPATIENT)
Dept: MRI IMAGING | Facility: HOSPITAL | Age: 82
Discharge: HOME | End: 2022-12-05
Admitting: NEUROLOGICAL SURGERY

## 2022-12-05 ENCOUNTER — OFFICE VISIT (OUTPATIENT)
Dept: NEUROSURGERY | Facility: CLINIC | Age: 82
End: 2022-12-05

## 2022-12-05 VITALS — BODY MASS INDEX: 30.51 KG/M2 | WEIGHT: 165.8 LBS | TEMPERATURE: 97.3 F | HEIGHT: 62 IN

## 2022-12-05 DIAGNOSIS — S06.5XAA SUBDURAL HEMATOMA: ICD-10-CM

## 2022-12-05 DIAGNOSIS — Z98.890 S/P COIL EMBOLIZATION OF CEREBRAL ANEURYSM: ICD-10-CM

## 2022-12-05 DIAGNOSIS — I67.1 INTRACRANIAL ANEURYSM: Primary | ICD-10-CM

## 2022-12-05 PROCEDURE — A9577 INJ MULTIHANCE: HCPCS | Performed by: NEUROLOGICAL SURGERY

## 2022-12-05 PROCEDURE — 70546 MR ANGIOGRAPH HEAD W/O&W/DYE: CPT

## 2022-12-05 PROCEDURE — 0 GADOBENATE DIMEGLUMINE 529 MG/ML SOLUTION: Performed by: NEUROLOGICAL SURGERY

## 2022-12-05 PROCEDURE — 99214 OFFICE O/P EST MOD 30 MIN: CPT | Performed by: NEUROLOGICAL SURGERY

## 2022-12-05 RX ADMIN — GADOBENATE DIMEGLUMINE 14 ML: 529 INJECTION, SOLUTION INTRAVENOUS at 11:41

## 2022-12-05 NOTE — PROGRESS NOTES
Subjective     Chief Complaint: Follow-up PICA aneurysm    Patient ID: Gloria Willams is a 82 y.o. female is here today for follow-up.    History of Present Illness    This is an 82-year-old woman in whom I treated a PICA aneurysm in 2018.  She presents today to discuss the results of her most recent surveillance MRI.  Over the past 12 months she reports some progressive worsening in terms of her gait and balance.  She had a fall at home recently.  She does live alone.    The following portions of the patient's history were reviewed and updated as appropriate: allergies, current medications, past family history, past medical history, past social history, past surgical history and problem list.    Family history:   Family History   Problem Relation Age of Onset   • Diabetes Mother    • Hypertension Mother    • Heart disease Father        Social history:   Social History     Socioeconomic History   • Marital status:    Tobacco Use   • Smoking status: Never   • Smokeless tobacco: Never   Substance and Sexual Activity   • Alcohol use: No   • Drug use: No   • Sexual activity: Defer       Review of Systems   Constitutional: Negative for activity change, appetite change, chills, diaphoresis, fatigue, fever and unexpected weight change.   HENT: Negative for congestion, dental problem, drooling, ear discharge, ear pain, facial swelling, hearing loss, mouth sores, nosebleeds, postnasal drip, rhinorrhea, sinus pressure, sinus pain, sneezing, sore throat, tinnitus, trouble swallowing and voice change.    Eyes: Negative for photophobia, pain, discharge, redness, itching and visual disturbance.   Respiratory: Negative for apnea, cough, choking, chest tightness, shortness of breath, wheezing and stridor.    Cardiovascular: Negative for chest pain, palpitations and leg swelling.   Gastrointestinal: Negative for abdominal distention, abdominal pain, anal bleeding, blood in stool, constipation, diarrhea, nausea, rectal pain and  "vomiting.   Endocrine: Negative for cold intolerance, heat intolerance, polydipsia, polyphagia and polyuria.   Genitourinary: Negative for decreased urine volume, difficulty urinating, dyspareunia, dysuria, enuresis, flank pain, frequency, genital sores, hematuria, menstrual problem, pelvic pain, urgency, vaginal bleeding, vaginal discharge and vaginal pain.   Musculoskeletal: Negative for arthralgias, back pain, gait problem, joint swelling, myalgias, neck pain and neck stiffness.   Skin: Negative for color change, pallor, rash and wound.   Allergic/Immunologic: Negative for environmental allergies, food allergies and immunocompromised state.   Neurological: Negative for dizziness, tremors, seizures, syncope, facial asymmetry, speech difficulty, weakness, light-headedness, numbness and headaches.   Hematological: Negative for adenopathy. Does not bruise/bleed easily.   Psychiatric/Behavioral: Negative for agitation, behavioral problems, confusion, decreased concentration, dysphoric mood, hallucinations, self-injury, sleep disturbance and suicidal ideas. The patient is not nervous/anxious and is not hyperactive.        Objective   Temperature 97.3 °F (36.3 °C), height 157.5 cm (62\"), weight 75.2 kg (165 lb 12.8 oz), not currently breastfeeding.  Body mass index is 30.33 kg/m².    Physical Exam  Constitutional:       General: She is not in acute distress.     Appearance: She is well-developed. She is not diaphoretic.   HENT:      Head: Normocephalic and atraumatic.   Pulmonary:      Effort: Pulmonary effort is normal.   Skin:     General: Skin is warm and dry.   Neurological:      Mental Status: She is alert and oriented to person, place, and time.      Cranial Nerves: No cranial nerve deficit.      Deep Tendon Reflexes:      Reflex Scores:       Patellar reflexes are 1+ on the right side and 1+ on the left side.       Achilles reflexes are 1+ on the right side and 1+ on the left side.     Comments: No clonus in the " lower extremities.  Sanjuana sign is absent.  Muscle stretch reflexes are symmetric and hyporeactive.  Unable to perform tandem gait testing, and Romberg sign is present.  Decreased vibratory sensation at the ankle         Assessment & Plan     Independent Review of Radiographic Studies:      Available for my review is a MRI a of the brain with and without contrast which was performed earlier today.  A comparison study from 12/6/2021 is also available for my review.  There is stable residual aneurysm at the base of the PICA aneurysm.    Medical Decision Making:      Repeat MRA of the brain with and without contrast in 1 year, or sooner if clinically indicated.  Physical therapy for balance and gait training was ordered by me.    Diagnoses and all orders for this visit:    1. Intracranial aneurysm (Primary)  -     MRI Angiogram Head With & Without Contrast; Future  -     Ambulatory Referral to Physical Therapy Evaluate and treat    2. S/P coil embolization of cerebral aneurysm  -     MRI Angiogram Head With & Without Contrast; Future  -     Ambulatory Referral to Physical Therapy Evaluate and treat    3. Subdural hematoma  -     MRI Angiogram Head With & Without Contrast; Future  -     Ambulatory Referral to Physical Therapy Evaluate and treat        No follow-ups on file.           This document signed by JUSTIN Cary MD December 5, 2022 14:16 EST

## (undated) DEVICE — DRV BATRY MATRIXPRO STRL

## (undated) DEVICE — PK CRANI 10

## (undated) DEVICE — Device

## (undated) DEVICE — PERFORATOR CRANI 14MM 11MM

## (undated) DEVICE — INTRO SHEATH ART/FEM ENGAGE .038 5F12CM

## (undated) DEVICE — DISTAL ACCESS CATHETER: Brand: AXS CATALYST 5

## (undated) DEVICE — RADIFOCUS GLIDEWIRE: Brand: GLIDEWIRE

## (undated) DEVICE — CATH TEMPO 5F BER 100CM: Brand: TEMPO

## (undated) DEVICE — LN INJ CONTRST FLXCIL HP F/M LL 1200PSI48

## (undated) DEVICE — ANGIO-SEAL VIP VASCULAR CLOSURE DEVICE: Brand: ANGIO-SEAL

## (undated) DEVICE — ST ACC MICROPUNCTURE .018 TRANSLSS/SS/TP 5F/10CM 21G/7CM

## (undated) DEVICE — BALN EVERCROSS OTW .035 5F 5X20 80

## (undated) DEVICE — 3M™ BAIR HUGGER® UNDERBODY BLANKET, ADULT, 10 PER CASE 54500: Brand: BAIR HUGGER™

## (undated) DEVICE — PINNACLE INTRODUCER SHEATH: Brand: PINNACLE

## (undated) DEVICE — SPNG GZ WOVN 4X4IN 12PLY 10/BX STRL

## (undated) DEVICE — TOTAL TRAY, 16FR 10ML SIL FOLEY, URN: Brand: MEDLINE

## (undated) DEVICE — ROTATING HEMOSTATIC VALVE .096": Brand: RHV

## (undated) DEVICE — LIMB HOLDERS: Brand: DEROYAL

## (undated) DEVICE — STPCK 3/WY HP M/RA W/OFF/HNDL 1050PSI STRL

## (undated) DEVICE — STANDARD PRE-SHAPED GUIDEWIRE WITH HYDROPHILIC COATING: Brand: SYNCHRO 2

## (undated) DEVICE — SUT VICYL 2/0 CR8 18IN DYED J726D

## (undated) DEVICE — 2 TIP STRAIGHT MICROCATHETER: Brand: EXCELSIOR SL-10

## (undated) DEVICE — DISPOSABLE BIPOLAR FORCEPS 7 3/4" (19.7CM) SCOVILLE BAYONET, INSULATED, 1.5MM TIP AND 12 FT. (3.6M) CABLE: Brand: KIRWAN

## (undated) DEVICE — ACCUDRAIN® EXTERNAL CSF DRAINAGE SYSTEM WITH ANTI-REFLUX VALVE: Brand: ACCUDRAIN®

## (undated) DEVICE — SYR CONTRL LUERLOK 10CC

## (undated) DEVICE — STPCK LP 1WY RA 200PSI

## (undated) DEVICE — DESTINATION PERIPHERAL GUIDING SHEATH: Brand: DESTINATION

## (undated) DEVICE — STERILE LATEX POWDER FREE SURGICAL GLOVES WITH HYDROGEL COATING: Brand: PROTEXIS

## (undated) DEVICE — 3M™ MEDIPORE™ H SOFT CLOTH SURGICAL TAPE, 2863, 3 IN X 10 YD, 12/CASE: Brand: 3M™ MEDIPORE™

## (undated) DEVICE — BALN EVERCROSS OTW .035 5F 7X20 80

## (undated) DEVICE — LEX NEURO ANGIOGRAPHY: Brand: MEDLINE INDUSTRIES, INC.

## (undated) DEVICE — GLV SURG PREMIERPRO MIC LTX PF SZ8.5 BRN

## (undated) DEVICE — DETACHMENT SYSTEM: Brand: INZONE

## (undated) DEVICE — MAYFIELD® DISPOSABLE ADULT SKULL PIN (PLASTIC BASE): Brand: MAYFIELD®

## (undated) DEVICE — Device: Brand: FUBUKI

## (undated) DEVICE — NDL HYPO ECLPS SFTY 25G 1 1/2IN

## (undated) DEVICE — SUT NUROLON 4/0 TF18 CR8 I8IN C584D

## (undated) DEVICE — DEV INFL MONARCH 25W

## (undated) DEVICE — TRAUMACATH™ LARGE STYLE VENTRICULAR CATHETER SET: Brand: TRAUMACATH™

## (undated) DEVICE — CATH DIAG EXPO PIG .045IN 5F 110CM

## (undated) DEVICE — LONG SHEATH: Brand: AXS INFINITY LS